# Patient Record
Sex: FEMALE | Race: WHITE | Employment: FULL TIME | URBAN - METROPOLITAN AREA
[De-identification: names, ages, dates, MRNs, and addresses within clinical notes are randomized per-mention and may not be internally consistent; named-entity substitution may affect disease eponyms.]

---

## 2017-03-13 ENCOUNTER — HOSPITAL ENCOUNTER (OUTPATIENT)
Dept: MAMMOGRAPHY | Age: 32
Discharge: HOME | End: 2017-03-13
Payer: COMMERCIAL

## 2017-03-13 ENCOUNTER — HOSPITAL ENCOUNTER (OUTPATIENT)
Dept: MRI IMAGING | Age: 32
Discharge: HOME | End: 2017-03-13
Payer: COMMERCIAL

## 2017-03-13 VITALS — HEART RATE: 75 BPM | SYSTOLIC BLOOD PRESSURE: 114 MMHG | DIASTOLIC BLOOD PRESSURE: 74 MMHG

## 2017-03-13 DIAGNOSIS — Z98.890 STATUS POST BREAST BIOPSY: ICD-10-CM

## 2017-03-13 DIAGNOSIS — N64.52 DISCHARGE FROM NIPPLE: ICD-10-CM

## 2017-03-13 PROCEDURE — 19288 PERQ DEV BREAST ADD MR GUIDE: CPT

## 2017-03-13 PROCEDURE — A9579 GAD-BASE MR CONTRAST NOS,1ML: HCPCS | Performed by: SURGERY

## 2017-03-13 PROCEDURE — 6360000004 HC RX CONTRAST MEDICATION: Performed by: SURGERY

## 2017-03-13 PROCEDURE — 88305 TISSUE EXAM BY PATHOLOGIST: CPT

## 2017-03-13 PROCEDURE — G0206 DX MAMMO INCL CAD UNI: HCPCS

## 2017-03-13 PROCEDURE — 19085 BX BREAST 1ST LESION MR IMAG: CPT

## 2017-03-13 RX ADMIN — GADOPENTETATE DIMEGLUMINE 20 ML: 469.01 INJECTION INTRAVENOUS at 12:32

## 2017-03-14 LAB — SURGICAL PATHOLOGY REPORT: NORMAL

## 2017-07-26 ENCOUNTER — HOSPITAL ENCOUNTER (OUTPATIENT)
Dept: INTERVENTIONAL RADIOLOGY/VASCULAR | Age: 32
Discharge: HOME | End: 2017-07-26
Payer: COMMERCIAL

## 2017-07-26 PROCEDURE — 6360000002 HC RX W HCPCS: Performed by: INTERNAL MEDICINE

## 2017-07-26 PROCEDURE — 6360000002 HC RX W HCPCS

## 2017-07-26 RX ADMIN — THYROTROPIN ALFA 0.9 MG: 0.9 INJECTION, POWDER, FOR SOLUTION INTRAMUSCULAR at 08:56

## 2017-07-27 ENCOUNTER — HOSPITAL ENCOUNTER (OUTPATIENT)
Age: 32
End: 2017-07-27
Payer: COMMERCIAL

## 2017-07-27 ENCOUNTER — HOSPITAL ENCOUNTER (OUTPATIENT)
Dept: INTERVENTIONAL RADIOLOGY/VASCULAR | Age: 32
Discharge: HOME | End: 2017-07-27
Payer: COMMERCIAL

## 2017-07-27 ENCOUNTER — HOSPITAL ENCOUNTER (OUTPATIENT)
Dept: NUCLEAR MEDICINE | Age: 32
Discharge: HOME | End: 2017-07-27
Payer: COMMERCIAL

## 2017-07-27 ENCOUNTER — HOSPITAL ENCOUNTER (OUTPATIENT)
Age: 32
Discharge: HOME | End: 2017-07-27
Payer: COMMERCIAL

## 2017-07-27 DIAGNOSIS — C73 MALIGNANT NEOPLASM OF THYROID GLAND (HCC): ICD-10-CM

## 2017-07-27 LAB — HCG QUALITATIVE: NEGATIVE

## 2017-07-27 PROCEDURE — 78018 THYROID MET IMAGING BODY: CPT

## 2017-07-27 PROCEDURE — 6360000002 HC RX W HCPCS: Performed by: INTERNAL MEDICINE

## 2017-07-27 PROCEDURE — 84703 CHORIONIC GONADOTROPIN ASSAY: CPT

## 2017-07-27 PROCEDURE — A9516 IODINE I-123 SOD IODIDE MIC: HCPCS | Performed by: INTERNAL MEDICINE

## 2017-07-27 PROCEDURE — 3430000000 HC RX DIAGNOSTIC RADIOPHARMACEUTICAL: Performed by: INTERNAL MEDICINE

## 2017-07-27 RX ADMIN — THYROTROPIN ALFA 0.9 MG: 0.9 INJECTION, POWDER, FOR SOLUTION INTRAMUSCULAR at 10:54

## 2017-07-27 RX ADMIN — Medication 1700 MICRO CURIE: at 13:10

## 2017-07-28 ENCOUNTER — HOSPITAL ENCOUNTER (OUTPATIENT)
Dept: NUCLEAR MEDICINE | Age: 32
Discharge: HOME | End: 2017-07-28
Payer: COMMERCIAL

## 2017-07-28 DIAGNOSIS — C73 THYROID CANCER (HCC): ICD-10-CM

## 2017-07-28 PROCEDURE — A9517 I131 IODIDE CAP, RX: HCPCS | Performed by: INTERNAL MEDICINE

## 2017-07-28 PROCEDURE — 79005 NUCLEAR RX ORAL ADMIN: CPT

## 2017-07-28 PROCEDURE — A9509 IODINE I-123 SOD IODIDE MIL: HCPCS

## 2017-07-28 PROCEDURE — 3430000000 HC RX DIAGNOSTIC RADIOPHARMACEUTICAL

## 2017-07-28 PROCEDURE — 3430000000 HC RX DIAGNOSTIC RADIOPHARMACEUTICAL: Performed by: INTERNAL MEDICINE

## 2017-07-28 RX ADMIN — Medication 77 MILLICURIE: at 13:10

## 2017-08-04 ENCOUNTER — HOSPITAL ENCOUNTER (OUTPATIENT)
Age: 32
Discharge: HOME | End: 2017-08-04
Payer: COMMERCIAL

## 2017-08-04 ENCOUNTER — HOSPITAL ENCOUNTER (OUTPATIENT)
Dept: NUCLEAR MEDICINE | Age: 32
Discharge: HOME | End: 2017-08-04
Payer: COMMERCIAL

## 2017-08-04 LAB
THYROGLOBULIN AB: 25.7 IU/ML (ref 0–40)
THYROGLOBULIN: 1.5 NG/ML (ref 0–63.4)

## 2017-08-04 PROCEDURE — 86800 THYROGLOBULIN ANTIBODY: CPT

## 2017-08-04 PROCEDURE — 84432 ASSAY OF THYROGLOBULIN: CPT

## 2017-08-04 PROCEDURE — 36415 COLL VENOUS BLD VENIPUNCTURE: CPT

## 2017-09-21 ENCOUNTER — INITIAL CONSULT (OUTPATIENT)
Dept: ONCOLOGY | Age: 32
End: 2017-09-21
Payer: COMMERCIAL

## 2017-09-21 DIAGNOSIS — Z83.49 FAMILY HISTORY OF THYROID DISEASE: ICD-10-CM

## 2017-09-21 DIAGNOSIS — Z80.3 FAMILY HISTORY OF BREAST CANCER: ICD-10-CM

## 2017-09-21 DIAGNOSIS — C73 THYROID CANCER (HCC): Primary | ICD-10-CM

## 2017-09-21 PROCEDURE — 99215 OFFICE O/P EST HI 40 MIN: CPT | Performed by: GENETIC COUNSELOR, MS

## 2018-02-14 ENCOUNTER — HOSPITAL ENCOUNTER (OUTPATIENT)
Dept: INTERVENTIONAL RADIOLOGY/VASCULAR | Age: 33
Discharge: HOME | End: 2018-02-16
Payer: COMMERCIAL

## 2018-02-14 PROCEDURE — 6360000002 HC RX W HCPCS: Performed by: INTERNAL MEDICINE

## 2018-02-14 PROCEDURE — 2580000003 HC RX 258: Performed by: INTERNAL MEDICINE

## 2018-02-14 RX ADMIN — WATER 1.2 ML: 1 INJECTION INTRAMUSCULAR; INTRAVENOUS; SUBCUTANEOUS at 12:13

## 2018-02-14 RX ADMIN — THYROTROPIN ALFA 0.9 MG: 0.9 INJECTION, POWDER, FOR SOLUTION INTRAMUSCULAR at 12:12

## 2018-02-14 NOTE — PROGRESS NOTES
THYROGEN INJECTION IN IR    PATIENT TO IR HOLDING, IDENTIFIED PER WRITER RN, ALLERGIES CHECKED, TIMEOUT PERFORMED. LEFT UPPER ARM PREPARED PER WRITER RN.  Lewis Schultz RN GAVE IM INJECTION AS ORDERED TO LEFT DELTOID. PATIENT TOLERATED WELL. NO PROBLEMS NOTED. BANDAGE TO LEFT UPPER ARM PUNCTURE SITE. PATIENT STABLE AND AMBULATES SELF OUT OF HOSPITAL.  PATIENT AWARE OF SCHEDULED APPOINTMENT 2/15/18 BACK IN IR.

## 2018-02-15 ENCOUNTER — HOSPITAL ENCOUNTER (OUTPATIENT)
Dept: INTERVENTIONAL RADIOLOGY/VASCULAR | Age: 33
Discharge: HOME | End: 2018-02-17
Payer: COMMERCIAL

## 2018-02-15 ENCOUNTER — HOSPITAL ENCOUNTER (OUTPATIENT)
Dept: NUCLEAR MEDICINE | Age: 33
Discharge: HOME | End: 2018-02-17
Payer: COMMERCIAL

## 2018-02-15 DIAGNOSIS — C73 THYROID CANCER (HCC): ICD-10-CM

## 2018-02-15 PROCEDURE — 78018 THYROID MET IMAGING BODY: CPT

## 2018-02-15 PROCEDURE — 6360000002 HC RX W HCPCS: Performed by: INTERNAL MEDICINE

## 2018-02-15 PROCEDURE — A9509 IODINE I-123 SOD IODIDE MIL: HCPCS | Performed by: INTERNAL MEDICINE

## 2018-02-15 PROCEDURE — 3430000000 HC RX DIAGNOSTIC RADIOPHARMACEUTICAL: Performed by: INTERNAL MEDICINE

## 2018-02-15 RX ADMIN — THYROTROPIN ALFA 0.9 MG: 0.9 INJECTION, POWDER, FOR SOLUTION INTRAMUSCULAR at 11:59

## 2018-02-15 RX ADMIN — Medication 2 MILLICURIE: at 12:55

## 2018-02-16 ENCOUNTER — HOSPITAL ENCOUNTER (OUTPATIENT)
Dept: NUCLEAR MEDICINE | Age: 33
Discharge: HOME | End: 2018-02-18
Payer: COMMERCIAL

## 2019-02-06 ENCOUNTER — PREP FOR PROCEDURE (OUTPATIENT)
Dept: SURGERY | Age: 34
End: 2019-02-06

## 2019-11-05 ENCOUNTER — HOSPITAL ENCOUNTER (EMERGENCY)
Facility: CLINIC | Age: 34
Discharge: HOME | End: 2019-11-05
Attending: EMERGENCY MEDICINE
Payer: COMMERCIAL

## 2019-11-05 ENCOUNTER — APPOINTMENT (OUTPATIENT)
Dept: CT IMAGING | Facility: CLINIC | Age: 34
End: 2019-11-05
Payer: COMMERCIAL

## 2019-11-05 VITALS
SYSTOLIC BLOOD PRESSURE: 145 MMHG | BODY MASS INDEX: 37.49 KG/M2 | TEMPERATURE: 98 F | RESPIRATION RATE: 20 BRPM | HEIGHT: 65 IN | DIASTOLIC BLOOD PRESSURE: 75 MMHG | OXYGEN SATURATION: 99 % | WEIGHT: 225 LBS | HEART RATE: 66 BPM

## 2019-11-05 DIAGNOSIS — R10.9 ABDOMINAL PAIN, UNSPECIFIED ABDOMINAL LOCATION: Primary | ICD-10-CM

## 2019-11-05 LAB
-: ABNORMAL
ABSOLUTE EOS #: 0.3 K/UL (ref 0–0.4)
ABSOLUTE IMMATURE GRANULOCYTE: ABNORMAL K/UL (ref 0–0.3)
ABSOLUTE LYMPH #: 1.3 K/UL (ref 1–4.8)
ABSOLUTE MONO #: 0.6 K/UL (ref 0.1–1.2)
ALBUMIN SERPL-MCNC: 3.7 G/DL (ref 3.5–5.2)
ALBUMIN/GLOBULIN RATIO: 1.3 (ref 1–2.5)
ALP BLD-CCNC: 105 U/L (ref 35–104)
ALT SERPL-CCNC: 16 U/L (ref 5–33)
AMORPHOUS: ABNORMAL
ANION GAP SERPL CALCULATED.3IONS-SCNC: 9 MMOL/L (ref 9–17)
AST SERPL-CCNC: 20 U/L
BACTERIA: ABNORMAL
BASOPHILS # BLD: 1 % (ref 0–2)
BASOPHILS ABSOLUTE: 0.1 K/UL (ref 0–0.2)
BILIRUB SERPL-MCNC: 0.2 MG/DL (ref 0.3–1.2)
BILIRUBIN URINE: NEGATIVE
BUN BLDV-MCNC: 7 MG/DL (ref 6–20)
BUN/CREAT BLD: ABNORMAL (ref 9–20)
CALCIUM SERPL-MCNC: 8.9 MG/DL (ref 8.6–10.4)
CASTS UA: ABNORMAL /LPF (ref 0–2)
CHLORIDE BLD-SCNC: 101 MMOL/L (ref 98–107)
CO2: 31 MMOL/L (ref 20–31)
COLOR: YELLOW
COMMENT UA: ABNORMAL
CREAT SERPL-MCNC: 0.6 MG/DL (ref 0.5–0.9)
CRYSTALS, UA: ABNORMAL /HPF
DIFFERENTIAL TYPE: ABNORMAL
EOSINOPHILS RELATIVE PERCENT: 4 % (ref 1–4)
EPITHELIAL CELLS UA: ABNORMAL /HPF (ref 0–5)
GFR AFRICAN AMERICAN: >60 ML/MIN
GFR NON-AFRICAN AMERICAN: >60 ML/MIN
GFR SERPL CREATININE-BSD FRML MDRD: ABNORMAL ML/MIN/{1.73_M2}
GFR SERPL CREATININE-BSD FRML MDRD: ABNORMAL ML/MIN/{1.73_M2}
GLUCOSE BLD-MCNC: 104 MG/DL (ref 70–99)
GLUCOSE URINE: NEGATIVE
HCT VFR BLD CALC: 37.5 % (ref 36–46)
HEMOGLOBIN: 12.6 G/DL (ref 12–16)
IMMATURE GRANULOCYTES: ABNORMAL %
KETONES, URINE: NEGATIVE
LEUKOCYTE ESTERASE, URINE: NEGATIVE
LYMPHOCYTES # BLD: 16 % (ref 24–44)
MCH RBC QN AUTO: 30 PG (ref 26–34)
MCHC RBC AUTO-ENTMCNC: 33.5 G/DL (ref 31–37)
MCV RBC AUTO: 89.5 FL (ref 80–100)
MONOCYTES # BLD: 7 % (ref 2–11)
MUCUS: ABNORMAL
NITRITE, URINE: NEGATIVE
NRBC AUTOMATED: ABNORMAL PER 100 WBC
OTHER OBSERVATIONS UA: ABNORMAL
PDW BLD-RTO: 13 % (ref 12.5–15.4)
PH UA: 7.5 (ref 5–8)
PLATELET # BLD: 439 K/UL (ref 140–450)
PLATELET ESTIMATE: ABNORMAL
PMV BLD AUTO: 6.2 FL (ref 6–12)
POTASSIUM SERPL-SCNC: 3.9 MMOL/L (ref 3.7–5.3)
PROTEIN UA: NEGATIVE
RBC # BLD: 4.19 M/UL (ref 4–5.2)
RBC # BLD: ABNORMAL 10*6/UL
RBC UA: ABNORMAL /HPF (ref 0–2)
RENAL EPITHELIAL, UA: ABNORMAL /HPF
SEG NEUTROPHILS: 72 % (ref 36–66)
SEGMENTED NEUTROPHILS ABSOLUTE COUNT: 5.9 K/UL (ref 1.8–7.7)
SODIUM BLD-SCNC: 141 MMOL/L (ref 135–144)
SPECIFIC GRAVITY UA: 1.01 (ref 1–1.03)
TOTAL PROTEIN: 6.5 G/DL (ref 6.4–8.3)
TRICHOMONAS: ABNORMAL
TURBIDITY: ABNORMAL
URINE HGB: ABNORMAL
UROBILINOGEN, URINE: NORMAL
WBC # BLD: 8.2 K/UL (ref 3.5–11)
WBC # BLD: ABNORMAL 10*3/UL
WBC UA: ABNORMAL /HPF (ref 0–5)
YEAST: ABNORMAL

## 2019-11-05 PROCEDURE — 36415 COLL VENOUS BLD VENIPUNCTURE: CPT

## 2019-11-05 PROCEDURE — 99284 EMERGENCY DEPT VISIT MOD MDM: CPT

## 2019-11-05 PROCEDURE — 80053 COMPREHEN METABOLIC PANEL: CPT

## 2019-11-05 PROCEDURE — 87086 URINE CULTURE/COLONY COUNT: CPT

## 2019-11-05 PROCEDURE — 85025 COMPLETE CBC W/AUTO DIFF WBC: CPT

## 2019-11-05 PROCEDURE — 81001 URINALYSIS AUTO W/SCOPE: CPT

## 2019-11-05 PROCEDURE — 74176 CT ABD & PELVIS W/O CONTRAST: CPT

## 2019-11-05 RX ORDER — PANTOPRAZOLE SODIUM 40 MG/1
40 TABLET, DELAYED RELEASE ORAL NIGHTLY
COMMUNITY

## 2019-11-05 RX ORDER — ONDANSETRON 4 MG/1
4 TABLET, ORALLY DISINTEGRATING ORAL EVERY 8 HOURS PRN
COMMUNITY
End: 2021-11-03 | Stop reason: SDUPTHER

## 2019-11-05 ASSESSMENT — PAIN DESCRIPTION - LOCATION: LOCATION: ABDOMEN

## 2019-11-05 ASSESSMENT — PAIN DESCRIPTION - DESCRIPTORS: DESCRIPTORS: SHARP;STABBING

## 2019-11-05 ASSESSMENT — ENCOUNTER SYMPTOMS
VOMITING: 0
SHORTNESS OF BREATH: 0
ABDOMINAL PAIN: 1
NAUSEA: 1
COUGH: 0

## 2019-11-05 ASSESSMENT — PAIN DESCRIPTION - PAIN TYPE: TYPE: ACUTE PAIN

## 2019-11-05 ASSESSMENT — PAIN SCALES - GENERAL: PAINLEVEL_OUTOF10: 6

## 2019-11-07 LAB
CULTURE: NORMAL
Lab: NORMAL
SPECIMEN DESCRIPTION: NORMAL

## 2019-11-14 ENCOUNTER — HOSPITAL ENCOUNTER (OUTPATIENT)
Age: 34
Discharge: HOME | End: 2019-11-14
Payer: COMMERCIAL

## 2019-11-14 LAB
CALCIUM IONIZED: 1.21 MMOL/L (ref 1.13–1.33)
CALCIUM SERPL-MCNC: 8.8 MG/DL (ref 8.6–10.4)
CORTISOL COLLECTION INFO: NORMAL
CORTISOL: 3.7 UG/DL (ref 2.7–18.4)
PTH INTACT: 59.84 PG/ML (ref 15–65)
VITAMIN D 25-HYDROXY: 38.8 NG/ML (ref 30–100)

## 2019-11-14 PROCEDURE — 82533 TOTAL CORTISOL: CPT

## 2019-11-14 PROCEDURE — 82088 ASSAY OF ALDOSTERONE: CPT

## 2019-11-14 PROCEDURE — 82306 VITAMIN D 25 HYDROXY: CPT

## 2019-11-14 PROCEDURE — 82330 ASSAY OF CALCIUM: CPT

## 2019-11-14 PROCEDURE — 80299 QUANTITATIVE ASSAY DRUG: CPT

## 2019-11-14 PROCEDURE — 83835 ASSAY OF METANEPHRINES: CPT

## 2019-11-14 PROCEDURE — 82310 ASSAY OF CALCIUM: CPT

## 2019-11-14 PROCEDURE — 84244 ASSAY OF RENIN: CPT

## 2019-11-14 PROCEDURE — 82024 ASSAY OF ACTH: CPT

## 2019-11-14 PROCEDURE — 82627 DEHYDROEPIANDROSTERONE: CPT

## 2019-11-14 PROCEDURE — 36415 COLL VENOUS BLD VENIPUNCTURE: CPT

## 2019-11-14 PROCEDURE — 83970 ASSAY OF PARATHORMONE: CPT

## 2019-11-15 LAB
ADRENOCORTICOTROPIC HORMONE: 7 PG/ML (ref 6–58)
CORTISOL SALIVARY: 0.09 UG/DL
DHEAS (DHEA SULFATE): 31.9 UG/DL (ref 45–270)

## 2019-11-16 LAB
ALDOSTERONE COMMENT: NORMAL
ALDOSTERONE: 11.1 NG/DL

## 2019-11-17 LAB
METANEPH/PLASMA INTERP: NORMAL
METANEPHRINE: 0.14 NMOL/L (ref 0–0.49)
NORMETANEPHRINE PLASMA: 0.35 NMOL/L (ref 0–0.89)
RENIN ACTIVITY: 0.7 NG/ML/HR
RENIN COMMENT: NORMAL

## 2019-11-18 ENCOUNTER — HOSPITAL ENCOUNTER (OUTPATIENT)
Age: 34
Discharge: HOME | End: 2019-11-18
Payer: COMMERCIAL

## 2019-11-18 PROCEDURE — 82530 CORTISOL FREE: CPT

## 2019-11-18 PROCEDURE — 81050 URINALYSIS VOLUME MEASURE: CPT

## 2019-11-19 LAB — DEXAMETHASONE: 72.7 NG/DL

## 2019-11-21 LAB
CORTISOL (UR), FREE: 26.1 UG/D
CORTISOL URINE, FREE (/G CRT): 21.2 UG/L
CORTISOL, URINE RATIO CREATININE: 20.58 UG/G CRT
CORTISOL, URINE: NORMAL
CREATININE URINE /24 HR: 1270 MG/D (ref 700–1600)
CREATININE URINE /VOLUME: 103 MG/DL
HOURS COLLECTED: 24
URINE VOLUME: 1233

## 2020-05-25 ENCOUNTER — HOSPITAL ENCOUNTER (EMERGENCY)
Facility: CLINIC | Age: 35
Discharge: HOME | End: 2020-05-25
Attending: EMERGENCY MEDICINE
Payer: COMMERCIAL

## 2020-05-25 VITALS
BODY MASS INDEX: 48.32 KG/M2 | DIASTOLIC BLOOD PRESSURE: 79 MMHG | SYSTOLIC BLOOD PRESSURE: 144 MMHG | OXYGEN SATURATION: 99 % | HEART RATE: 67 BPM | WEIGHT: 290 LBS | TEMPERATURE: 98 F | RESPIRATION RATE: 20 BRPM | HEIGHT: 65 IN

## 2020-05-25 LAB
ABSOLUTE EOS #: 0.2 K/UL (ref 0–0.4)
ABSOLUTE IMMATURE GRANULOCYTE: NORMAL K/UL (ref 0–0.3)
ABSOLUTE LYMPH #: 1.8 K/UL (ref 1–4.8)
ABSOLUTE MONO #: 0.4 K/UL (ref 0.1–1.2)
ANION GAP SERPL CALCULATED.3IONS-SCNC: 11 MMOL/L (ref 9–17)
BASOPHILS # BLD: 1 % (ref 0–2)
BASOPHILS ABSOLUTE: 0.1 K/UL (ref 0–0.2)
BNP INTERPRETATION: NORMAL
BUN BLDV-MCNC: 6 MG/DL (ref 6–20)
BUN/CREAT BLD: ABNORMAL (ref 9–20)
CALCIUM SERPL-MCNC: 8.5 MG/DL (ref 8.6–10.4)
CHLORIDE BLD-SCNC: 102 MMOL/L (ref 98–107)
CO2: 27 MMOL/L (ref 20–31)
CREAT SERPL-MCNC: 1.1 MG/DL (ref 0.5–0.9)
DIFFERENTIAL TYPE: NORMAL
EOSINOPHILS RELATIVE PERCENT: 4 % (ref 1–4)
GFR AFRICAN AMERICAN: >60 ML/MIN
GFR NON-AFRICAN AMERICAN: 56 ML/MIN
GFR SERPL CREATININE-BSD FRML MDRD: ABNORMAL ML/MIN/{1.73_M2}
GFR SERPL CREATININE-BSD FRML MDRD: ABNORMAL ML/MIN/{1.73_M2}
GLUCOSE BLD-MCNC: 95 MG/DL (ref 70–99)
HCT VFR BLD CALC: 43.4 % (ref 36–46)
HEMOGLOBIN: 14.6 G/DL (ref 12–16)
IMMATURE GRANULOCYTES: NORMAL %
LYMPHOCYTES # BLD: 26 % (ref 24–44)
MCH RBC QN AUTO: 30.5 PG (ref 26–34)
MCHC RBC AUTO-ENTMCNC: 33.6 G/DL (ref 31–37)
MCV RBC AUTO: 90.9 FL (ref 80–100)
MONOCYTES # BLD: 6 % (ref 2–11)
NRBC AUTOMATED: NORMAL PER 100 WBC
PDW BLD-RTO: 13.1 % (ref 12.5–15.4)
PLATELET # BLD: 418 K/UL (ref 140–450)
PLATELET ESTIMATE: NORMAL
PMV BLD AUTO: 6.3 FL (ref 6–12)
POTASSIUM SERPL-SCNC: 3.2 MMOL/L (ref 3.7–5.3)
PRO-BNP: 41 PG/ML
RBC # BLD: 4.77 M/UL (ref 4–5.2)
RBC # BLD: NORMAL 10*6/UL
SEG NEUTROPHILS: 63 % (ref 36–66)
SEGMENTED NEUTROPHILS ABSOLUTE COUNT: 4.3 K/UL (ref 1.8–7.7)
SODIUM BLD-SCNC: 140 MMOL/L (ref 135–144)
THYROXINE, FREE: 0.23 NG/DL (ref 0.93–1.7)
TROPONIN INTERP: NORMAL
TROPONIN T: NORMAL NG/ML
TROPONIN, HIGH SENSITIVITY: <6 NG/L (ref 0–14)
TSH SERPL DL<=0.05 MIU/L-ACNC: 53.9 MIU/L (ref 0.3–5)
WBC # BLD: 6.8 K/UL (ref 3.5–11)
WBC # BLD: NORMAL 10*3/UL

## 2020-05-25 PROCEDURE — 83880 ASSAY OF NATRIURETIC PEPTIDE: CPT

## 2020-05-25 PROCEDURE — 2580000003 HC RX 258: Performed by: EMERGENCY MEDICINE

## 2020-05-25 PROCEDURE — 99284 EMERGENCY DEPT VISIT MOD MDM: CPT

## 2020-05-25 PROCEDURE — 84443 ASSAY THYROID STIM HORMONE: CPT

## 2020-05-25 PROCEDURE — 36415 COLL VENOUS BLD VENIPUNCTURE: CPT

## 2020-05-25 PROCEDURE — 80048 BASIC METABOLIC PNL TOTAL CA: CPT

## 2020-05-25 PROCEDURE — 85025 COMPLETE CBC W/AUTO DIFF WBC: CPT

## 2020-05-25 PROCEDURE — 93005 ELECTROCARDIOGRAM TRACING: CPT | Performed by: EMERGENCY MEDICINE

## 2020-05-25 PROCEDURE — 84439 ASSAY OF FREE THYROXINE: CPT

## 2020-05-25 PROCEDURE — 6370000000 HC RX 637 (ALT 250 FOR IP): Performed by: EMERGENCY MEDICINE

## 2020-05-25 PROCEDURE — 84484 ASSAY OF TROPONIN QUANT: CPT

## 2020-05-25 RX ORDER — LEVOTHYROXINE SODIUM 75 UG/ML
150 SOLUTION ORAL DAILY
COMMUNITY
End: 2020-07-30 | Stop reason: ALTCHOICE

## 2020-05-25 RX ORDER — 0.9 % SODIUM CHLORIDE 0.9 %
1000 INTRAVENOUS SOLUTION INTRAVENOUS ONCE
Status: COMPLETED | OUTPATIENT
Start: 2020-05-25 | End: 2020-05-25

## 2020-05-25 RX ADMIN — POTASSIUM BICARBONATE 40 MEQ: 782 TABLET, EFFERVESCENT ORAL at 12:33

## 2020-05-25 RX ADMIN — SODIUM CHLORIDE 1000 ML: 9 INJECTION, SOLUTION INTRAVENOUS at 12:40

## 2020-05-25 ASSESSMENT — PAIN SCALES - GENERAL: PAINLEVEL_OUTOF10: 3

## 2020-05-25 ASSESSMENT — PAIN DESCRIPTION - FREQUENCY: FREQUENCY: CONTINUOUS

## 2020-05-25 ASSESSMENT — PAIN DESCRIPTION - DESCRIPTORS: DESCRIPTORS: ACHING

## 2020-05-25 ASSESSMENT — PAIN DESCRIPTION - LOCATION: LOCATION: HEAD

## 2020-05-25 ASSESSMENT — ENCOUNTER SYMPTOMS
COUGH: 0
SHORTNESS OF BREATH: 0

## 2020-05-25 NOTE — ED PROVIDER NOTES
[address] ED  EMERGENCY DEPARTMENT ENCOUNTER      Pt Name: Mg Kline  MRN: [id number]  [place] 1985  Date of evaluation: 5/25/2020  Provider: Marcela Cash MD    CHIEF COMPLAINT     Chief Complaint   Patient presents with    Dizziness     c/o \"think my thyroid is acting up\" c/o feeling dizzy since Saturday. pt c/o feeling scared due to Covid and doesn't want to go to work tomorrow and requesting to be off. HISTORY OF PRESENT ILLNESS   (Location/Symptom, Timing/Onset, Context/Setting,Quality, Duration, Modifying Factors, Severity)  Note limiting factors. Mg Kline is a 37 y.o. female who presents to the emergency department 2-day history of lightheadedness and dizziness. Patient states she feels bloated. She has a history of thyroid cancer and underwent total thyroidectomy a few years ago. She has been on thyroid supplementation since and is scheduled to have a TSH done but was uncertain if she could get this done as an outpatient. She has an appointment with her endocrinologist on June 8. The history is provided by the patient and medical records. Nursing Notes werereviewed. REVIEW OF SYSTEMS    (2-9 systems for level 4, 10 or more for level 5)     Review of Systems   Constitutional: Positive for unexpected weight change. Negative for fever. Respiratory: Negative for cough and shortness of breath. All other systems reviewed and are negative. Except as noted above the remainder of the review of systems was reviewed and negative.        PAST MEDICAL HISTORY     Past Medical History:   Diagnosis Date    Arrhythmia 4/7/2011    Depression 4/7/2011    Migraine 4/7/2011    Neurologic cardiac syncope 4/7/2011         SURGICALHISTORY       Past Surgical History:   Procedure Laterality Date    APPENDECTOMY      GALLBLADDER SURGERY           CURRENT MEDICATIONS       Discharge Medication List as of 5/25/2020  1:19 PM      CONTINUE these medications which have NOT CHANGED    Details   Levothyroxine Sodium (TIROSINT-SOL) 75 MCG/ML SOLN Take by mouth 75mcg qdHistorical Med      pantoprazole (PROTONIX) 40 MG tablet Take 40 mg by mouth dailyHistorical Med      ondansetron (ZOFRAN-ODT) 4 MG disintegrating tablet Take 4 mg by mouth every 8 hours as needed for Nausea or VomitingHistorical Med      Vitamin D (CHOLECALCIFEROL) 1000 UNITS CAPS capsule Take 4,000 Units by mouth 2 times daily      Magnesium Oxide 500 MG TABS Take 500 mg by mouth 2 times daily      promethazine (PHENERGAN) 25 MG tablet Take 1 po q 6 hours PRN, Disp-30 tablet, R-0      ALPRAZolam (XANAX) 1 MG tablet Take 1 tablet by mouth nightly as needed for Sleep. Do Not drive, this medication cause drowsiness   , Disp-10 tablet, R-0      FLUoxetine (PROZAC) 20 MG capsule Take 1 capsule by mouth daily. , Disp-90 capsule, R-0      fludrocortisone (FLORINEF) 0.1 MG tablet Take 0.1 mg by mouth daily. ALLERGIES     Amoxil [amoxicillin];  Nsaids; and Shrimp (diagnostic)    FAMILY HISTORY       Family History   Problem Relation Age of Onset    Thyroid Disease Paternal Grandfather    [facility] Migraines Mother     Other Mother 43        uterine fibroids, DEXTER/BSO     High Blood Pressure Father     Heart Disease Father     Migraines Sister     Breast Cancer Maternal Cousin         1st cousin once removed     Thyroid Cancer Maternal Cousin           SOCIAL HISTORY       Social History     Socioeconomic History    Marital status:      Spouse name: Not on file    Number of children: Not on file    Years of education: Not on file    Highest education level: Not on file   Occupational History    Not on file   Social Needs    Financial resource strain: Not on file    Food insecurity     Worry: Not on file     Inability: Not on file    Transportation needs     Medical: Not on file     Non-medical: Not on file   Tobacco Use    Smoking status: Never Smoker    Smokeless tobacco: Never Used   Substance normal saline and given 40 mEq of oral potassium. Upon discharge she is advised to eat a banana or orange daily to supplement her potassium stores. TSH level has been ordered but has to be sent out to be reported and should be resulted by tomorrow. Patient is advised to check her lab work and contact her [de-identified] office for further management. MDM    CONSULTS:  None    PROCEDURES:  Unlessotherwise noted below, none     Procedures    FINAL IMPRESSION      1. Lightheadedness    2. Hypokalemia          DISPOSITION/PLAN   DISPOSITION Decision To Discharge 05/25/2020 01:18:00 PM      PATIENT REFERRED TO:  VIVIEN Salmeron CNP  1F [address] [facility] [address]          MD Stef [address] [place] [address]  [phone]      on june 8 as scheduled      DISCHARGE MEDICATIONS:         Problem List:  Patient Active Problem List   Diagnosis Code    Depression F32.9    Arrhythmia I49.9    Neurologic cardiac syncope R55    Migraine G43.909           Summation      Patient Course: Discharged. ED Medicationsadministered this visit:    Medications   0.9 % sodium chloride bolus (0 mLs Intravenous Stopped 5/25/20 1313)   potassium bicarb-citric acid (EFFER-K) effervescent tablet 40 mEq (40 mEq Oral Given 5/25/20 1233)       New Prescriptions from this visit:    Discharge Medication List as of 5/25/2020  1:19 PM          Follow-up:  VIVIEN Salmeron CNP  [address] [facility] [address] [place] [address]          MD Stef Lynch [address]  [phone]      on june 8 as scheduled        Final Impression:   1. Lightheadedness    2.  Hypokalemia               (Please note that portions of this note were completed with a voice recognitionprogram.  Efforts were made to edit the dictations but occasionally words are mis-transcribed.)    Nathan Vivas MD (electronically signed)  Attending Emergency

## 2020-05-25 NOTE — ED NOTES
Pt had her thyroid removed in 2017, pt states she sees Dr Freya Parkinson and hasn't had her levels checked since 4/27. Pt c/o feeling dizzy since Saturday and slight headache. Pt next appt is 6/8. Pt has a script for her thyroid levels to be drawn \"a week before I go see him\". Pt states \"I have to go back to work tomorrow and I don't want to because I feel dizzy and I probably need my level check now or not go to work and labs aren't open due to Covid\".       Harish So RN  05/25/20 1355

## 2020-05-25 NOTE — ED NOTES
Bed: ER11  Expected date:   Expected time:   Means of arrival:   Comments:     Maria Fernanda Parada RN  05/25/20 1053

## 2020-05-26 LAB
EKG ATRIAL RATE: 65 BPM
EKG P AXIS: 32 DEGREES
EKG P-R INTERVAL: 166 MS
EKG Q-T INTERVAL: 434 MS
EKG QRS DURATION: 106 MS
EKG QTC CALCULATION (BAZETT): 451 MS
EKG R AXIS: 6 DEGREES
EKG T AXIS: -92 DEGREES
EKG VENTRICULAR RATE: 65 BPM

## 2020-06-16 ENCOUNTER — OFFICE VISIT (OUTPATIENT)
Dept: FAMILY MEDICINE CLINIC | Age: 35
End: 2020-06-16
Payer: COMMERCIAL

## 2020-06-16 VITALS
BODY MASS INDEX: 43.58 KG/M2 | HEART RATE: 72 BPM | DIASTOLIC BLOOD PRESSURE: 68 MMHG | TEMPERATURE: 97.2 F | SYSTOLIC BLOOD PRESSURE: 114 MMHG | OXYGEN SATURATION: 98 % | RESPIRATION RATE: 17 BRPM | HEIGHT: 66 IN | WEIGHT: 271.2 LBS

## 2020-06-16 PROCEDURE — 99203 OFFICE O/P NEW LOW 30 MIN: CPT | Performed by: NURSE PRACTITIONER

## 2020-06-16 RX ORDER — TOPIRAMATE 100 MG/1
200 TABLET, FILM COATED ORAL NIGHTLY
Status: ON HOLD | COMMUNITY
End: 2021-11-04

## 2020-06-16 RX ORDER — ARMODAFINIL 250 MG/1
TABLET ORAL DAILY
COMMUNITY

## 2020-06-16 RX ORDER — TOPIRAMATE 50 MG/1
50 TABLET, FILM COATED ORAL DAILY
COMMUNITY
End: 2020-07-30 | Stop reason: ALTCHOICE

## 2020-06-16 RX ORDER — ALPRAZOLAM 1 MG/1
1 TABLET ORAL NIGHTLY PRN
Qty: 10 TABLET | Refills: 1 | Status: SHIPPED | OUTPATIENT
Start: 2020-06-16 | End: 2020-06-26

## 2020-06-16 RX ORDER — FLUOXETINE HYDROCHLORIDE 20 MG/1
40 CAPSULE ORAL DAILY
Qty: 90 CAPSULE | Refills: 1 | Status: SHIPPED | OUTPATIENT
Start: 2020-06-16 | End: 2021-07-01

## 2020-06-16 SDOH — ECONOMIC STABILITY: FOOD INSECURITY: WITHIN THE PAST 12 MONTHS, THE FOOD YOU BOUGHT JUST DIDN'T LAST AND YOU DIDN'T HAVE MONEY TO GET MORE.: NEVER TRUE

## 2020-06-16 SDOH — SOCIAL STABILITY: SOCIAL NETWORK: HOW OFTEN DO YOU ATTENT MEETINGS OF THE CLUB OR ORGANIZATION YOU BELONG TO?: NEVER

## 2020-06-16 SDOH — SOCIAL STABILITY: SOCIAL NETWORK: ARE YOU MARRIED, WIDOWED, DIVORCED, SEPARATED, NEVER MARRIED, OR LIVING WITH A PARTNER?: MARRIED

## 2020-06-16 SDOH — HEALTH STABILITY: MENTAL HEALTH
STRESS IS WHEN SOMEONE FEELS TENSE, NERVOUS, ANXIOUS, OR CAN'T SLEEP AT NIGHT BECAUSE THEIR MIND IS TROUBLED. HOW STRESSED ARE YOU?: TO SOME EXTENT

## 2020-06-16 SDOH — SOCIAL STABILITY: SOCIAL INSECURITY
WITHIN THE LAST YEAR, HAVE TO BEEN RAPED OR FORCED TO HAVE ANY KIND OF SEXUAL ACTIVITY BY YOUR PARTNER OR EX-PARTNER?: NO

## 2020-06-16 SDOH — ECONOMIC STABILITY: FOOD INSECURITY: WITHIN THE PAST 12 MONTHS, YOU WORRIED THAT YOUR FOOD WOULD RUN OUT BEFORE YOU GOT MONEY TO BUY MORE.: NEVER TRUE

## 2020-06-16 SDOH — SOCIAL STABILITY: SOCIAL INSECURITY: WITHIN THE LAST YEAR, HAVE YOU BEEN HUMILIATED OR EMOTIONALLY ABUSED IN OTHER WAYS BY YOUR PARTNER OR EX-PARTNER?: NO

## 2020-06-16 SDOH — ECONOMIC STABILITY: TRANSPORTATION INSECURITY
IN THE PAST 12 MONTHS, HAS THE LACK OF TRANSPORTATION KEPT YOU FROM MEDICAL APPOINTMENTS OR FROM GETTING MEDICATIONS?: NO

## 2020-06-16 SDOH — HEALTH STABILITY: PHYSICAL HEALTH: ON AVERAGE, HOW MANY MINUTES DO YOU ENGAGE IN EXERCISE AT THIS LEVEL?: 30 MIN

## 2020-06-16 SDOH — SOCIAL STABILITY: SOCIAL NETWORK: HOW OFTEN DO YOU ATTEND CHURCH OR RELIGIOUS SERVICES?: NEVER

## 2020-06-16 SDOH — SOCIAL STABILITY: SOCIAL NETWORK: HOW OFTEN DO YOU GET TOGETHER WITH FRIENDS OR RELATIVES?: ONCE A WEEK

## 2020-06-16 SDOH — SOCIAL STABILITY: SOCIAL INSECURITY: WITHIN THE LAST YEAR, HAVE YOU BEEN AFRAID OF YOUR PARTNER OR EX-PARTNER?: NO

## 2020-06-16 SDOH — HEALTH STABILITY: PHYSICAL HEALTH: ON AVERAGE, HOW MANY DAYS PER WEEK DO YOU ENGAGE IN MODERATE TO STRENUOUS EXERCISE (LIKE A BRISK WALK)?: 3 DAYS

## 2020-06-16 SDOH — SOCIAL STABILITY: SOCIAL NETWORK: IN A TYPICAL WEEK, HOW MANY TIMES DO YOU TALK ON THE PHONE WITH FAMILY, FRIENDS, OR NEIGHBORS?: ONCE A WEEK

## 2020-06-16 SDOH — ECONOMIC STABILITY: TRANSPORTATION INSECURITY
IN THE PAST 12 MONTHS, HAS LACK OF TRANSPORTATION KEPT YOU FROM MEETINGS, WORK, OR FROM GETTING THINGS NEEDED FOR DAILY LIVING?: NO

## 2020-06-16 SDOH — ECONOMIC STABILITY: INCOME INSECURITY: HOW HARD IS IT FOR YOU TO PAY FOR THE VERY BASICS LIKE FOOD, HOUSING, MEDICAL CARE, AND HEATING?: NOT HARD AT ALL

## 2020-06-16 SDOH — SOCIAL STABILITY: SOCIAL NETWORK
DO YOU BELONG TO ANY CLUBS OR ORGANIZATIONS SUCH AS CHURCH GROUPS UNIONS, FRATERNAL OR ATHLETIC GROUPS, OR SCHOOL GROUPS?: NO

## 2020-06-16 SDOH — SOCIAL STABILITY: SOCIAL INSECURITY
WITHIN THE LAST YEAR, HAVE YOU BEEN KICKED, HIT, SLAPPED, OR OTHERWISE PHYSICALLY HURT BY YOUR PARTNER OR EX-PARTNER?: NO

## 2020-06-16 ASSESSMENT — PATIENT HEALTH QUESTIONNAIRE - PHQ9
SUM OF ALL RESPONSES TO PHQ QUESTIONS 1-9: 0
SUM OF ALL RESPONSES TO PHQ9 QUESTIONS 1 & 2: 0
1. LITTLE INTEREST OR PLEASURE IN DOING THINGS: 0
SUM OF ALL RESPONSES TO PHQ QUESTIONS 1-9: 0
2. FEELING DOWN, DEPRESSED OR HOPELESS: 0

## 2020-06-16 NOTE — PROGRESS NOTES
0    fludrocortisone (FLORINEF) 0.1 MG tablet Take 0.1 mg by mouth daily. No current facility-administered medications for this visit.       Past Medical History:   Diagnosis Date    Arrhythmia 2011    Depression 2011    GERD (gastroesophageal reflux disease)     Gastoric presis     Hyperthyroidism     Migraine 2011    Neurologic cardiac syncope 2011      Past Surgical History:   Procedure Laterality Date    APPENDECTOMY      BREAST LUMPECTOMY Left     5 lumps removed out of left breast      SECTION      2 C- Sections     GALLBLADDER SURGERY      GASTRIC BYPASS SURGERY      MICHELLE-EN-Y GASTRIC BYPASS N/A     Patient had to get gastric bypass reversed due to Ulcers removed     THYROIDECTOMY Bilateral     whole thyroid removed      Family History   Problem Relation Age of Onset    Thyroid Disease Paternal Grandfather    Connie Carbone Migraines Mother     Other Mother 43        uterine fibroids, DEXTER/BSO     High Blood Pressure Father     Heart Disease Father     Migraines Sister     Breast Cancer Maternal Cousin         1st cousin once removed     Thyroid Cancer Maternal Cousin     No Known Problems Brother     No Known Problems Sister      Social History     Tobacco Use    Smoking status: Never Smoker    Smokeless tobacco: Never Used   Substance Use Topics    Alcohol use: No      Allergies   Allergen Reactions    Amoxil [Amoxicillin] Hives    Cefdinir Hives     Other reaction(s): hives rash  Other reaction(s): hives rash      Frovatriptan Hives     Other reaction(s): hives  Other reaction(s): hives      Metoclopramide      Other reaction(s): JITTERY/ELEVATED BP  Other reaction(s): JITTERY/ELEVATED BP  Other reaction(s): JITTERY/ELEVATED BP      Morphine Itching    Nsaids     Shrimp (Diagnostic) Hives     Health Maintenance   Topic Date Due    Cervical cancer screen  2021 (Originally 2014)    HIV screen  2021 (Originally 1998)    DTaP/Tdap/Td

## 2020-06-22 PROBLEM — E87.6 HYPOKALEMIA: Status: ACTIVE | Noted: 2020-06-22

## 2020-06-22 PROBLEM — E55.9 VITAMIN D DEFICIENCY: Status: ACTIVE | Noted: 2020-06-22

## 2020-06-22 PROBLEM — F41.9 ANXIETY: Status: ACTIVE | Noted: 2020-06-22

## 2020-06-22 PROBLEM — Z76.89 ENCOUNTER TO ESTABLISH CARE: Status: ACTIVE | Noted: 2020-06-22

## 2020-06-22 PROBLEM — R73.01 ELEVATED FASTING GLUCOSE: Status: ACTIVE | Noted: 2020-06-22

## 2020-06-22 ASSESSMENT — ENCOUNTER SYMPTOMS
RHINORRHEA: 0
CONSTIPATION: 0
SHORTNESS OF BREATH: 0
COUGH: 0
SORE THROAT: 0
DIARRHEA: 0

## 2020-06-25 LAB
AVERAGE GLUCOSE: 94
HBA1C MFR BLD: 4.9 %

## 2020-07-30 ENCOUNTER — OFFICE VISIT (OUTPATIENT)
Dept: FAMILY MEDICINE CLINIC | Age: 35
End: 2020-07-30
Payer: COMMERCIAL

## 2020-07-30 VITALS
BODY MASS INDEX: 45.62 KG/M2 | TEMPERATURE: 97.1 F | DIASTOLIC BLOOD PRESSURE: 72 MMHG | OXYGEN SATURATION: 98 % | HEIGHT: 65 IN | RESPIRATION RATE: 16 BRPM | WEIGHT: 273.8 LBS | HEART RATE: 66 BPM | SYSTOLIC BLOOD PRESSURE: 108 MMHG

## 2020-07-30 PROCEDURE — 99214 OFFICE O/P EST MOD 30 MIN: CPT | Performed by: NURSE PRACTITIONER

## 2020-07-30 RX ORDER — POTASSIUM CHLORIDE 750 MG/1
CAPSULE, EXTENDED RELEASE ORAL 3 TIMES DAILY
COMMUNITY
Start: 2020-07-28 | End: 2020-10-13 | Stop reason: SDUPTHER

## 2020-07-30 RX ORDER — LEVOTHYROXINE SODIUM 175 UG/1
175 TABLET ORAL DAILY
COMMUNITY
End: 2020-11-24 | Stop reason: DRUGHIGH

## 2020-07-30 RX ORDER — CETIRIZINE HYDROCHLORIDE 10 MG/1
TABLET ORAL NIGHTLY
COMMUNITY
Start: 2020-06-25

## 2020-07-30 RX ORDER — CEFUROXIME AXETIL 250 MG/1
TABLET ORAL PRN
COMMUNITY
Start: 2020-06-18

## 2020-07-30 RX ORDER — THYROID, PORCINE 60 MG/1
65 TABLET ORAL DAILY
COMMUNITY
End: 2021-01-26

## 2020-07-30 RX ORDER — AMILORIDE HYDROCHLORIDE 5 MG/1
TABLET ORAL DAILY
COMMUNITY
Start: 2020-07-28 | End: 2020-10-13

## 2020-07-30 ASSESSMENT — PATIENT HEALTH QUESTIONNAIRE - PHQ9
1. LITTLE INTEREST OR PLEASURE IN DOING THINGS: 0
SUM OF ALL RESPONSES TO PHQ9 QUESTIONS 1 & 2: 0
2. FEELING DOWN, DEPRESSED OR HOPELESS: 0
SUM OF ALL RESPONSES TO PHQ QUESTIONS 1-9: 0
SUM OF ALL RESPONSES TO PHQ QUESTIONS 1-9: 0

## 2020-07-30 NOTE — PROGRESS NOTES
[address] [facility]   [address]  [phone]    7/30/2020     Patient ID: Cuate Nayak is a 38 y.o. female. CHIEF COMPLAINT:     Cuate Nayak presents today for her medical conditions/complaints as noted below. Senthil Mitchell is c/o of Atrial Fibrillation (6 week follow up Symptom Check) and Discuss Labs (Lab review )  . REVIEWED:      [x] Past Medical, Family, and Social History was reviewed per writer and does contribute to the patient presenting condition.     [x] Laboratory Results, Vital signs, Imaging, Active Problems, Immunizations, Current/Recently Discontinued Medications, Health Maintenance Activities Due, Referral Notes (if available) were reviewed per writer     [x] Reviewed Depression screening if taken or valid today or any other valid screening tool (others seen below) Interpretation of Total Score DepressionSeverity: 1-4 = Minimal depression, 5-9 = Mild depression, 10-14 = Moderate depression, 15-19 = Moderately severe depression, 20-27 =Severe depression    PHQ Scores 7/30/2020 6/16/2020   PHQ2 Score 0 0   PHQ9 Score 0 0       Interpretation of Total Score DepressionSeverity: 1-4 = Minimal depression, 5-9 = Mild depression, 10-14 = Moderate depression, 15-19 = Moderately severe depression, 20-27 = Severe depression    Allergies   Allergen Reactions    Amoxicillin-Pot Clavulanate Hives     Other reaction(s): rash    Amoxil [Amoxicillin] Hives    Cefdinir Hives and Rash           Frovatriptan Hives           Metoclopramide Other (See Comments)     Other reaction(s): JITTERY/ELEVATED BP        Morphine Itching    Nsaids Other (See Comments)     Patient Gets Stomach Ulcers    Shrimp (Diagnostic) Hives    Shrimp Extract Allergy Skin Test Hives     Current Outpatient Medications   Medication Sig Dispense Refill    aMILoride (MIDAMOR) 5 MG tablet Take by mouth daily      cetirizine (ZYRTEC) 10 MG tablet Take by mouth daily      potassium Migraines Sister     Breast Cancer Maternal Cousin         1st cousin once removed     Thyroid Cancer Maternal Cousin     No Known Problems Brother     No Known Problems Sister      Social History     Tobacco Use    Smoking status: Never Smoker    Smokeless tobacco: Never Used   Substance Use Topics    Alcohol use: No        Patient Care Team:    REVIEW OF SYSTEMS:     Review of Systems   Constitutional: Positive for fatigue. Negative for activity change and unexpected weight change. HENT: Negative for congestion, ear pain, hearing loss, rhinorrhea and sore throat. Respiratory: Negative for cough and shortness of breath. Cardiovascular: Negative for chest pain, palpitations and leg swelling. Gastrointestinal: Positive for abdominal distention (improving). Negative for constipation and diarrhea. Musculoskeletal: Negative for arthralgias and gait problem. Neurological: Positive for headaches (current migraine - does not request any treatment at this time). Negative for dizziness and weakness. Psychiatric/Behavioral: Negative for confusion. The patient is nervous/anxious (regarding new history and recommendation for thyroid nodule). HISTORY OF PRESENT ILLNESS       She does have a migraine today -     Patient was seen in er due to low potassium and was increased on potassium - states that it was down to 2.7 when contacted by endocrine - and reported that she was given oral Potassium and increased dosage to 10 meq 3x daily       Reports that she hsa felt as if her stomach is improving with emptying since the GPOM procedure    She was started on amiloride 5 mg daily and increased potasium 3 x daily by [facility] to help manage fluid levels - weekly blood pwerk      Noted significant increase her LDL from previous year - this primarily may be due to malabsorption issues and continues to remain an.     Returns today for evaluation of labs.   However notes that labs have not completed at this Normal heart sounds. Pulmonary:      Effort: Pulmonary effort is normal.      Breath sounds: Normal breath sounds. Abdominal:      General: Abdomen is flat. There is no distension. Palpations: Abdomen is soft. There is no mass. Tenderness: There is no abdominal tenderness. There is no right CVA tenderness or left CVA tenderness. Genitourinary:     Rectum: Normal.   Musculoskeletal: Normal range of motion. General: No swelling or tenderness. Skin:     General: Skin is warm. Capillary Refill: Capillary refill takes less than 2 seconds. Findings: No erythema or rash. Neurological:      General: No focal deficit present. Mental Status: She is alert and oriented to person, place, and time. Psychiatric:         Mood and Affect: Mood normal.         Behavior: Behavior normal.          ASSESSMENT /PLAN     1. Hypokalemia  Continue   - Basic Metabolic Panel; Standing    2. Elevated LDL cholesterol level  Repeat lab as ordered  Continue to monitor  Continue with medication   -healthy diet as discussed  -daily exercise with in physical limitations    - Lipid Panel; Future  - Comprehensive Metabolic Panel, Fasting; Future      Return in about 3 months (around 10/30/2020) for symptom check and lab review. COMMUNICATION:           The best way to find yourself is to lose yourself in the service of others - [address] [facility] [facility]   [email or web address]  Office: [phone]   Cell: [phone]    Electronically signed by MADDIE Sorensen on 8/11/2020 at 12:11 AM

## 2020-08-03 LAB
BUN BLDV-MCNC: 13 MG/DL
CALCIUM SERPL-MCNC: 8.6 MG/DL
CHLORIDE BLD-SCNC: 107 MMOL/L
CO2: 27 MMOL/L
CREAT SERPL-MCNC: 1.13 MG/DL
GFR CALCULATED: 54
GLUCOSE BLD-MCNC: 110 MG/DL
POTASSIUM SERPL-SCNC: 3.8 MMOL/L
SODIUM BLD-SCNC: 142 MMOL/L

## 2020-08-11 ASSESSMENT — ENCOUNTER SYMPTOMS
COUGH: 0
CONSTIPATION: 0
SORE THROAT: 0
SHORTNESS OF BREATH: 0
DIARRHEA: 0
ABDOMINAL DISTENTION: 1
RHINORRHEA: 0

## 2020-10-05 ENCOUNTER — OFFICE VISIT (OUTPATIENT)
Dept: FAMILY MEDICINE CLINIC | Age: 35
End: 2020-10-05
Payer: COMMERCIAL

## 2020-10-05 ENCOUNTER — TELEPHONE (OUTPATIENT)
Dept: FAMILY MEDICINE CLINIC | Age: 35
End: 2020-10-05

## 2020-10-05 VITALS
TEMPERATURE: 97.9 F | DIASTOLIC BLOOD PRESSURE: 76 MMHG | HEIGHT: 65 IN | RESPIRATION RATE: 15 BRPM | WEIGHT: 269.6 LBS | OXYGEN SATURATION: 97 % | BODY MASS INDEX: 44.92 KG/M2 | SYSTOLIC BLOOD PRESSURE: 118 MMHG | HEART RATE: 84 BPM

## 2020-10-05 PROCEDURE — 99214 OFFICE O/P EST MOD 30 MIN: CPT | Performed by: NURSE PRACTITIONER

## 2020-10-05 RX ORDER — HYDROCODONE BITARTRATE AND ACETAMINOPHEN 5; 325 MG/1; MG/1
1 TABLET ORAL EVERY 6 HOURS PRN
Qty: 20 TABLET | Refills: 0 | Status: SHIPPED | OUTPATIENT
Start: 2020-10-05 | End: 2020-10-10

## 2020-10-05 RX ORDER — PREDNISONE 20 MG/1
TABLET ORAL
Qty: 20 TABLET | Refills: 0 | Status: SHIPPED | OUTPATIENT
Start: 2020-10-05 | End: 2020-10-17

## 2020-10-05 RX ORDER — TRAMADOL HYDROCHLORIDE 50 MG/1
50 TABLET ORAL EVERY 6 HOURS PRN
COMMUNITY
Start: 2020-10-05 | End: 2020-10-05 | Stop reason: ALTCHOICE

## 2020-10-05 ASSESSMENT — PATIENT HEALTH QUESTIONNAIRE - PHQ9
1. LITTLE INTEREST OR PLEASURE IN DOING THINGS: 0
2. FEELING DOWN, DEPRESSED OR HOPELESS: 0
SUM OF ALL RESPONSES TO PHQ QUESTIONS 1-9: 0
SUM OF ALL RESPONSES TO PHQ QUESTIONS 1-9: 0
SUM OF ALL RESPONSES TO PHQ9 QUESTIONS 1 & 2: 0

## 2020-10-05 ASSESSMENT — ENCOUNTER SYMPTOMS: BACK PAIN: 1

## 2020-10-05 NOTE — TELEPHONE ENCOUNTER
Pt was seen in [facility] ED today for severe back pain. They gave her pain meds and muscle relaxers but PT wants to be seen to see if she has a pinched nerve today if possible by Holden Hampton. She said she cannot stand up straight and is in a lot of pain.     Routing encounter per Mi/office

## 2020-10-05 NOTE — PATIENT INSTRUCTIONS
It was my pleasure to meet with you today. Please contact me with any questions or concerns, and please notify myself or our manger if there is anyway we can improve our service in your health care needs. Below I have listed some instructions and information that pertain to today's visit.    -You have been advised to continue all current medication, otherwise not discussed in today's visit  -New medications and refills will been sent and made available at pharmacy or mail away  -Heathy daily diet to include healthy balanced diet with good portions of lean meats and vegetables  -Drink 6-8 glasses of water daily  -Complete  fasting (8-12 hours - water, black coffee, plain tea ok) labs and/any other testing ordered prior to next scheduled followup      Patient is to discontinue use of tramadol as it is not effective. - she has had better response with Vicodin. Norflex discontinued as she is has skelaxin at home. Medication as prescribed  Advised patient on supportive therapies, including rest, relaxation techniques, heat application, weight loss, ergonomic therapies, and refraining from lifting heavy objects. Instructed patient on low back pain ROM exercises. Warning symptoms discussed which would prompt return. Patient Education        Acute Low Back Pain: Exercises  Introduction  Here are some examples of typical rehabilitation exercises for your condition. Start each exercise slowly. Ease off the exercise if you start to have pain. Your doctor or physical therapist will tell you when you can start these exercises and which ones will work best for you. When you are not being active, find a comfortable position for rest. Some people are comfortable on the floor or a medium-firm bed with a small pillow under their head and another under their knees. Some people prefer to lie on their side with a pillow between their knees. Don't stay in one position for too long.   Take short walks (10 to 20 minutes) every 2 to 3 hours. Avoid slopes, hills, and stairs until you feel better. Walk only distances you can manage without pain, especially leg pain. How to do the exercises  Back stretches   1. Get down on your hands and knees on the floor. 2. Relax your head and allow it to droop. Round your back up toward the ceiling until you feel a nice stretch in your upper, middle, and lower back. Hold this stretch for as long as it feels comfortable, or about 15 to 30 seconds. 3. Return to the starting position with a flat back while you are on your hands and knees. 4. Let your back sway by pressing your stomach toward the floor. Lift your buttocks toward the ceiling. 5. Hold this position for 15 to 30 seconds. 6. Repeat 2 to 4 times. Follow-up care is a key part of your treatment and safety. Be sure to make and go to all appointments, and call your doctor if you are having problems. It's also a good idea to know your test results and keep a list of the medicines you take. Where can you learn more? Go to https://[employer]. org and sign in to your [email or web address] account. Enter Y669 in the RoleStar box to learn more about \"Acute Low Back Pain: Exercises. \"     If you do not have an account, please click on the \"Sign Up Now\" link. Current as of: March 2, 2020               Content Version: 12.5  © 4025-9989 [employer], Incorporated. Care instructions adapted under license by [facility] ([facility]). If you have questions about a medical condition or this instruction, always ask your healthcare professional. [place] [address] any warranty or liability for your use of this information. Medication as prescribed  Advised Ibuprofen for pain and inflammation. Advised patient on supportive therapies, including rest, relaxation techniques, heat application, weight loss, ergonomic therapies, and refraining from lifting heavy objects.   Instructed patient on low back pain ROM exercises. Warning symptoms discussed which would prompt return.

## 2020-10-05 NOTE — PROGRESS NOTES
[address] [facility]  [phone]    Date of Visit:  10/5/2020  Patient :  1985   CHIEF COMPLAINT:     Lacey Ruffin is a 38 y.o. female who presents today for an acute visit to be evaluated for the following condition:  Chief Complaint   Patient presents with    Lower Back Pain     Since Friday today is the worse day        REVIEW OF SYSTEM      Review of Systems   Musculoskeletal: Positive for back pain. HISTORY OF PRESENT ILLNESS       ER/ URGENT CARE FOLLOWUP    Patient presents today for follow up after being assessed and evaluated  at [facility] ER on 10/5/2020. Patient presented with complaints of back pain, left. Testing completed on patient while evaluated included xray. The xray which was found to be normal. Patient diagnosed with acute pain and muscle spasm . Discharged to home with norflex and tramado. She reports that since she was seen she has taken taken . Instructed to follow-up with primary care. Patient was not consulted and referred to any other specialty. Synopsis of events prior to presenting to ER. Back Pain   This is a new problem. The current episode started in the past 7 days. The problem occurs constantly. The problem has been rapidly worsening since onset. The pain is present in the lumbar spine. The pain radiates to the left thigh. The pain is at a severity of 10/10. The pain is severe. The pain is the same all the time. The symptoms are aggravated by bending, lying down, standing, twisting, sitting and position (standing straight up hurts the worse). Risk factors include lack of exercise, poor posture, sedentary lifestyle and obesity. She has tried bed rest, ice, heat, muscle relaxant and walking for the symptoms. The treatment provided no relief.        REVIEWED INFORMATION      Allergies   Allergen Reactions    Amoxicillin-Pot Clavulanate Hives     Other reaction(s): rash    Amoxil [Amoxicillin] Hives    Cefdinir Hives and Rash           Frovatriptan Hives           Metoclopramide Other (See Comments)     Other reaction(s): JITTERY/ELEVATED BP        Morphine Itching    Nsaids Other (See Comments)     Patient Gets Stomach Ulcers    Shrimp (Diagnostic) Hives    Shrimp Extract Allergy Skin Test Hives       Patient Active Problem List   Diagnosis    Depression    Arrhythmia    Neurologic cardiac syncope    Migraine    Encounter to establish care    Vitamin D deficiency    Elevated fasting glucose    Hypokalemia    Anxiety       Past Medical History:   Diagnosis Date    Arrhythmia 4/7/2011    Depression 4/7/2011    GERD (gastroesophageal reflux disease)     Gastoric presis     Hyperthyroidism     Migraine 4/7/2011    Neurologic cardiac syncope 4/7/2011       PHYSICAL EXAM   ASS  Vitals:    10/05/20 1248   BP: 118/76   Site: Left Upper Arm   Position: Sitting   Cuff Size: Large Adult   Pulse: 84   Resp: 15   Temp: 97.9 °F (36.6 °C)   TempSrc: Temporal   SpO2: 97%   Weight: 269 lb 9.6 oz (122.3 kg)   Height: 5' 5\" (1.651 m)     Physical Exam  Vitals signs reviewed. Constitutional:       Appearance: Normal appearance. She is not ill-appearing. HENT:      Head: Normocephalic and atraumatic. Eyes:      Extraocular Movements: Extraocular movements intact. Pupils: Pupils are equal, round, and reactive to light. Neck:      Musculoskeletal: Normal range of motion and neck supple. Vascular: No carotid bruit. Cardiovascular:      Rate and Rhythm: Normal rate and regular rhythm. Pulses: Normal pulses. Heart sounds: Normal heart sounds. No murmur. No friction rub. No gallop. Pulmonary:      Effort: Pulmonary effort is normal. No respiratory distress. Breath sounds: Normal breath sounds. No wheezing. Abdominal:      General: Abdomen is flat. Bowel sounds are normal. There is no distension. Palpations: Abdomen is soft. There is no mass. Tenderness:  There is no abdominal tenderness. There is no right CVA tenderness or left CVA tenderness. Musculoskeletal:         General: No swelling. Lumbar back: She exhibits decreased range of motion, tenderness, bony tenderness and swelling. Right upper leg: She exhibits tenderness. Right lower leg: No edema. Left lower leg: No edema. Skin:     General: Skin is warm. Capillary Refill: Capillary refill takes less than 2 seconds. Findings: No erythema, lesion or rash. Neurological:      General: No focal deficit present. Mental Status: She is alert and oriented to person, place, and time. Psychiatric:         Mood and Affect: Mood normal.         Behavior: Behavior normal. Behavior is cooperative. ASSESSMENT/PLAN     1. Sciatica of left side  Start medication      - HYDROcodone-acetaminophen (NORCO) 5-325 MG per tablet; Take 1 tablet by mouth every 6 hours as needed for Pain for up to 5 days. Intended supply: 7 days. Take lowest dose possible to manage pain  Dispense: 20 tablet; Refill: 0  - predniSONE (DELTASONE) 20 MG tablet; Take 3 tablets by mouth daily for 2 days, THEN 2.5 tablets daily for 2 days, THEN 2 tablets daily for 2 days, THEN 1.5 tablets daily for 2 days, THEN 1 tablet daily for 2 days, THEN 0.5 tablets daily for 2 days. Take medication with food. Dispense: 20 tablet; Refill: 0      Return in about 1 week (around 10/12/2020) for recheck symptoms of today's primary complaint. COMMUNICATION:           The best way to find yourself is to lose yourself in the service of others - [id number] [place], Susan. \[facility].\[email or web address]  Office: [phone]   Cell: [phone]    Electronically signed by MADDIE Truong on 10/5/2020 at 1:50 PM

## 2020-10-13 ENCOUNTER — OFFICE VISIT (OUTPATIENT)
Dept: FAMILY MEDICINE CLINIC | Age: 35
End: 2020-10-13
Payer: COMMERCIAL

## 2020-10-13 VITALS
BODY MASS INDEX: 43.23 KG/M2 | DIASTOLIC BLOOD PRESSURE: 80 MMHG | OXYGEN SATURATION: 98 % | SYSTOLIC BLOOD PRESSURE: 120 MMHG | TEMPERATURE: 97.2 F | RESPIRATION RATE: 14 BRPM | HEART RATE: 73 BPM | HEIGHT: 66 IN | WEIGHT: 269 LBS

## 2020-10-13 PROCEDURE — 99214 OFFICE O/P EST MOD 30 MIN: CPT | Performed by: NURSE PRACTITIONER

## 2020-10-13 RX ORDER — POTASSIUM CHLORIDE 750 MG/1
10 CAPSULE, EXTENDED RELEASE ORAL 3 TIMES DAILY
Qty: 90 CAPSULE | Refills: 1 | Status: SHIPPED | OUTPATIENT
Start: 2020-10-13 | End: 2022-05-23

## 2020-10-13 RX ORDER — AMILORIDE HYDROCHLORIDE 5 MG/1
5 TABLET ORAL DAILY
Qty: 30 TABLET | Refills: 3 | Status: SHIPPED | OUTPATIENT
Start: 2020-10-13 | End: 2021-05-12

## 2020-10-13 ASSESSMENT — ENCOUNTER SYMPTOMS
RHINORRHEA: 0
DIARRHEA: 0
COUGH: 0
SHORTNESS OF BREATH: 0
CONSTIPATION: 0
SORE THROAT: 0

## 2020-10-13 ASSESSMENT — PATIENT HEALTH QUESTIONNAIRE - PHQ9
SUM OF ALL RESPONSES TO PHQ9 QUESTIONS 1 & 2: 0
1. LITTLE INTEREST OR PLEASURE IN DOING THINGS: 0
2. FEELING DOWN, DEPRESSED OR HOPELESS: 0
SUM OF ALL RESPONSES TO PHQ QUESTIONS 1-9: 0
SUM OF ALL RESPONSES TO PHQ QUESTIONS 1-9: 0

## 2020-10-13 NOTE — PROGRESS NOTES
[facility] [facility]   [address]   [phone]    10/13/2020     Patient ID: Fredrick West is a 38 y.o. female. CHIEF COMPLAINT:     Fredrick West presents today for her medical conditions/complaints as noted below. Marc Mirza is c/o of Back Pain (follow up )  . REVIEWED:      [x] Past Medical, Family, and Social History was reviewed per writer and does contribute to the patient presenting condition.     [x] Laboratory Results, Vital signs, Imaging, Active Problems, Immunizations, Current/Recently Discontinued Medications, Health Maintenance Activities Due, Referral Notes (if available) were reviewed per writer     [x] Reviewed Depression screening if taken or valid today or any other valid screening tool (others seen below) Interpretation of Total Score DepressionSeverity: 1-4 = Minimal depression, 5-9 = Mild depression, 10-14 = Moderate depression, 15-19 = Moderately severe depression, 20-27 =Severe depression    PHQ Scores 10/13/2020 10/5/2020 7/30/2020 6/16/2020   PHQ2 Score 0 0 0 0   PHQ9 Score 0 0 0 0       Interpretation of Total Score DepressionSeverity: 1-4 = Minimal depression, 5-9 = Mild depression, 10-14 = Moderate depression, 15-19 = Moderately severe depression, 20-27 = Severe depression    Allergies   Allergen Reactions    Amoxicillin-Pot Clavulanate Hives     Other reaction(s): rash    Amoxil [Amoxicillin] Hives    Cefdinir Hives and Rash           Frovatriptan Hives           Metoclopramide Other (See Comments)     Other reaction(s): JITTERY/ELEVATED BP        Morphine Itching    Nsaids Other (See Comments)     Patient Gets Stomach Ulcers    Shrimp (Diagnostic) Hives    Shrimp Extract Allergy Skin Test Hives     Current Outpatient Medications   Medication Sig Dispense Refill    aMILoride (MIDAMOR) 5 MG tablet Take 1 tablet by mouth daily 30 tablet 3    potassium chloride (MICRO-K) 10 MEQ extended release capsule Take 1 capsule by mouth 3 times daily for 30 doses 90 capsule 1    cetirizine (ZYRTEC) 10 MG tablet Take by mouth daily      SUMAtriptan Succinate 6 MG/0.5ML SOAJ Inject as directed as needed      levothyroxine (SYNTHROID) 175 MCG tablet Take 175 mcg by mouth Daily      thyroid (ARMOUR) 65 MG tablet Take 65 mg by mouth daily      topiramate (TOPAMAX) 100 MG tablet Take 200 mg by mouth nightly       Armodafinil (NUVIGIL) 250 MG TABS Take by mouth daily.  FLUoxetine (PROZAC) 20 MG capsule Take 2 capsules by mouth daily 90 capsule 1    pantoprazole (PROTONIX) 40 MG tablet Take 40 mg by mouth daily      ondansetron (ZOFRAN-ODT) 4 MG disintegrating tablet Take 4 mg by mouth every 8 hours as needed for Nausea or Vomiting      Vitamin D (CHOLECALCIFEROL) 1000 UNITS CAPS capsule Take 4,000 Units by mouth 2 times daily      Magnesium Oxide 500 MG TABS Take 500 mg by mouth 2 times daily      promethazine (PHENERGAN) 25 MG tablet Take 1 po q 6 hours PRN 30 tablet 0    fludrocortisone (FLORINEF) 0.1 MG tablet Take 0.1 mg by mouth daily. No current facility-administered medications for this visit.       Past Medical History:   Diagnosis Date    Arrhythmia 2011    Depression 2011    GERD (gastroesophageal reflux disease)     Gastoric presis     Hyperthyroidism     Migraine 2011    Neurologic cardiac syncope 2011      Past Surgical History:   Procedure Laterality Date    APPENDECTOMY      BREAST LUMPECTOMY Left     5 lumps removed out of left breast      SECTION      2 C- Sections     GALLBLADDER SURGERY      GASTRIC BYPASS SURGERY      MICHELLE-EN-Y GASTRIC BYPASS N/A     Patient had to get gastric bypass reversed due to Ulcers removed     THYROIDECTOMY Bilateral     whole thyroid removed      Family History   Problem Relation Age of Onset    Thyroid Disease Paternal Grandfather    [place] [facility] Migraines Mother     Other Mother 43        uterine fibroids, DEXTER/BSO     High Blood Pressure Father     Heart Disease Father     Migraines Sister     Breast Cancer Maternal Cousin         1st cousin once removed     Thyroid Cancer Maternal Cousin     No Known Problems Brother     No Known Problems Sister      Social History     Tobacco Use    Smoking status: Never Smoker    Smokeless tobacco: Never Used   Substance Use Topics    Alcohol use: No            REVIEW OF SYSTEMS:     Review of Systems   Constitutional: Negative for activity change, fatigue and unexpected weight change. HENT: Negative for congestion, ear pain, hearing loss, rhinorrhea and sore throat. Respiratory: Negative for cough and shortness of breath. Cardiovascular: Positive for leg swelling (increased leg swelling). Negative for chest pain and palpitations. Gastrointestinal: Negative for constipation and diarrhea. Musculoskeletal: Negative for arthralgias and gait problem. Neurological: Negative for dizziness, weakness and headaches. Psychiatric/Behavioral: Negative for confusion. The patient is not nervous/anxious. HISTORY OF PRESENT ILLNESS     Back Pain is improved. Swelling of lower extremities - was on aldactone - and treated for low potassium - was on for one month the not refilled. Patient reports that she was feeling much better when taking - blood pressure was controlled with no significant drop      PHYSICAL EXAM:     /80 (Site: Left Upper Arm, Position: Sitting, Cuff Size: Large Adult)   Pulse 73   Temp 97.2 °F (36.2 °C) (Temporal)   Resp 14   Ht 5' 5.5\" (1.664 m)   Wt 269 lb (122 kg)   LMP 09/08/2020 (Approximate)   SpO2 98%   BMI 44.08 kg/m²    Physical Exam  Vitals signs reviewed. Constitutional:       Appearance: Normal appearance. She is not ill-appearing. Cardiovascular:      Rate and Rhythm: Normal rate and regular rhythm. Heart sounds: No murmur. No friction rub. No gallop. Pulmonary:      Effort: Pulmonary effort is normal. No respiratory distress. Breath sounds:  No wheezing. Abdominal:      General: Bowel sounds are normal.      Palpations: Abdomen is soft. Tenderness: There is no abdominal tenderness. Musculoskeletal:      Right lower leg: No edema. Left lower leg: No edema. Skin:     General: Skin is warm. Findings: No erythema, lesion or rash. Neurological:      Mental Status: She is alert. Psychiatric:         Mood and Affect: Mood normal.         Behavior: Behavior is cooperative. ASSESSMENT /PLAN     1. Hypokalemia    - aMILoride (MIDAMOR) 5 MG tablet; Take 1 tablet by mouth daily  Dispense: 30 tablet; Refill: 3  - potassium chloride (MICRO-K) 10 MEQ extended release capsule; Take 1 capsule by mouth 3 times daily for 30 doses  Dispense: 90 capsule; Refill: 1  - Basic Metabolic Panel; Standing    2. Swelling    - aMILoride (MIDAMOR) 5 MG tablet; Take 1 tablet by mouth daily  Dispense: 30 tablet; Refill: 3  - potassium chloride (MICRO-K) 10 MEQ extended release capsule; Take 1 capsule by mouth 3 times daily for 30 doses  Dispense: 90 capsule; Refill: 1  - Basic Metabolic Panel; Standing      Return in about 6 weeks (around 11/24/2020) for symptom check and lab review. COMMUNICATION:     More than 50% of this 30 minute visit was spent discussing Plan of Care in detail including but not limited education, counseling, and coordination of care. The best way to find yourself is to lose yourself in the service of others - [id number] [facility]   [email or web address]  Office: [phone]   Cell: [phone]    Electronically signed by MADDIE Bowman on 10/19/2020 at 1:36 AM

## 2020-10-26 LAB
ALBUMIN SERPL-MCNC: 3.9 G/DL
ALP BLD-CCNC: 66 U/L
ALT SERPL-CCNC: 11 U/L
AST SERPL-CCNC: 15 U/L
BASOPHILS ABSOLUTE: 0.1 /ΜL
BASOPHILS RELATIVE PERCENT: 0.8 %
BILIRUB SERPL-MCNC: 0.2 MG/DL (ref 0.1–1.4)
BUN BLDV-MCNC: 6 MG/DL
CALCIUM SERPL-MCNC: 8.6 MG/DL
CHLORIDE BLD-SCNC: 107 MMOL/L
CO2: 29 MMOL/L
CREAT SERPL-MCNC: 0.79 MG/DL
EOSINOPHILS ABSOLUTE: 0.1 /ΜL
EOSINOPHILS RELATIVE PERCENT: 1.2 %
GLUCOSE FASTING: 103 MG/DL
HCT VFR BLD CALC: 38.5 % (ref 36–46)
HEMOGLOBIN: 13.3 G/DL (ref 12–16)
LYMPHOCYTES ABSOLUTE: 1.3 /ΜL
LYMPHOCYTES RELATIVE PERCENT: 20 %
MCH RBC QN AUTO: 30.4 PG
MCHC RBC AUTO-ENTMCNC: 34.5 G/DL
MCV RBC AUTO: 88 FL
MONOCYTES ABSOLUTE: 0.4 /ΜL
MONOCYTES RELATIVE PERCENT: 6.2 %
NEUTROPHILS ABSOLUTE: 4.8 /ΜL
NEUTROPHILS RELATIVE PERCENT: 71.8 %
PDW BLD-RTO: 12.3 %
PLATELET # BLD: 293 K/ΜL
PMV BLD AUTO: 7.1 FL
POTASSIUM SERPL-SCNC: 3.6 MMOL/L
RBC # BLD: 4.38 10^6/ΜL
SODIUM BLD-SCNC: 140 MMOL/L
TOTAL PROTEIN: 6.4 G/DL (ref 6.4–8.2)
VITAMIN D 25-HYDROXY: 62.4
VITAMIN D2, 25 HYDROXY: NORMAL
VITAMIN D3,25 HYDROXY: NORMAL
WBC # BLD: 6.7 10^3/ML

## 2020-11-04 ENCOUNTER — HOSPITAL ENCOUNTER (OUTPATIENT)
Dept: INTERVENTIONAL RADIOLOGY/VASCULAR | Age: 35
Discharge: HOME | End: 2020-11-06
Payer: COMMERCIAL

## 2020-11-04 PROCEDURE — 2709999900 HC NON-CHARGEABLE SUPPLY

## 2020-11-04 PROCEDURE — 96372 THER/PROPH/DIAG INJ SC/IM: CPT

## 2020-11-04 PROCEDURE — 2500000003 HC RX 250 WO HCPCS: Performed by: INTERNAL MEDICINE

## 2020-11-04 PROCEDURE — 6360000002 HC RX W HCPCS: Performed by: INTERNAL MEDICINE

## 2020-11-04 RX ORDER — WATER 1000 ML/1000ML
1.2 INJECTION, SOLUTION INTRAVENOUS ONCE
Status: COMPLETED | OUTPATIENT
Start: 2020-11-04 | End: 2020-11-04

## 2020-11-04 RX ORDER — WATER 1000 ML/1000ML
1.2 INJECTION, SOLUTION INTRAVENOUS ONCE
Status: CANCELLED | OUTPATIENT
Start: 2020-11-05

## 2020-11-04 RX ADMIN — THYROTROPIN ALFA 0.9 MG: 0.9 INJECTION, POWDER, FOR SOLUTION INTRAMUSCULAR at 14:11

## 2020-11-04 RX ADMIN — WATER 1.2 ML: 100 INJECTION, SOLUTION INTRAVENOUS at 14:11

## 2020-11-04 NOTE — FLOWSHEET NOTE
PATIENT TO IR.  TIMEOUT COMPLETED, ALLERGIES NOTED AND REVIEWED. PATIENT DENIES ANY QUESTIONS. INJECTION GIVEN IN LEFT ARM PER PATIENT REQUEST, COVERED WITH BANDAID. PATIENT TOLERATED WELL. PATIENT AMBULATED PER SELF. NO COMPLICATIONS.

## 2020-11-05 ENCOUNTER — HOSPITAL ENCOUNTER (OUTPATIENT)
Dept: INTERVENTIONAL RADIOLOGY/VASCULAR | Age: 35
Discharge: HOME | End: 2020-11-07
Payer: COMMERCIAL

## 2020-11-05 ENCOUNTER — HOSPITAL ENCOUNTER (OUTPATIENT)
Dept: NUCLEAR MEDICINE | Age: 35
Discharge: HOME | End: 2020-11-07
Payer: COMMERCIAL

## 2020-11-05 PROCEDURE — A9509 IODINE I-123 SOD IODIDE MIL: HCPCS | Performed by: INTERNAL MEDICINE

## 2020-11-05 PROCEDURE — 3430000000 HC RX DIAGNOSTIC RADIOPHARMACEUTICAL: Performed by: INTERNAL MEDICINE

## 2020-11-05 PROCEDURE — 78018 THYROID MET IMAGING BODY: CPT

## 2020-11-05 PROCEDURE — 2500000003 HC RX 250 WO HCPCS: Performed by: INTERNAL MEDICINE

## 2020-11-05 PROCEDURE — 6360000002 HC RX W HCPCS: Performed by: INTERNAL MEDICINE

## 2020-11-05 RX ORDER — WATER 1000 ML/1000ML
1.2 INJECTION, SOLUTION INTRAVENOUS ONCE
Status: COMPLETED | OUTPATIENT
Start: 2020-11-05 | End: 2020-11-05

## 2020-11-05 RX ADMIN — WATER 1.2 ML: 100 INJECTION, SOLUTION INTRAVENOUS at 13:31

## 2020-11-05 RX ADMIN — THYROTROPIN ALFA 0.9 MG: 0.9 INJECTION, POWDER, FOR SOLUTION INTRAMUSCULAR at 13:28

## 2020-11-05 RX ADMIN — Medication 2.3 MILLICURIE: at 13:30

## 2020-11-06 ENCOUNTER — HOSPITAL ENCOUNTER (OUTPATIENT)
Dept: NUCLEAR MEDICINE | Age: 35
Discharge: HOME | End: 2020-11-08
Payer: COMMERCIAL

## 2020-11-20 NOTE — RESULT ENCOUNTER NOTE
Vitamin D numbers are good  Thyroid level numbers are good  Blood sugar is normal - fasting glucose - I did not get A1C results as ordered   Liver function is normal  Kidney function is normal  Blood counts are normal with no indication of anemia or low platelets. Potassium level is good! No changes to plan of care at this time. Continue with any medications you are currently taking, with no change to dosages. I always encourage healthy life style practice including diet low in carbohydrates, hidden sugars, good amount of physical activity, stay hydrated, stress relieve strategies such as meditation and deep breathing, and get a good night sleep.      Still not completed are your A1C and your Lipid Panel

## 2020-11-24 ENCOUNTER — OFFICE VISIT (OUTPATIENT)
Dept: FAMILY MEDICINE CLINIC | Age: 35
End: 2020-11-24
Payer: COMMERCIAL

## 2020-11-24 VITALS
TEMPERATURE: 97.2 F | SYSTOLIC BLOOD PRESSURE: 118 MMHG | DIASTOLIC BLOOD PRESSURE: 74 MMHG | OXYGEN SATURATION: 99 % | HEART RATE: 68 BPM | BODY MASS INDEX: 44.9 KG/M2 | WEIGHT: 274 LBS

## 2020-11-24 PROCEDURE — 99213 OFFICE O/P EST LOW 20 MIN: CPT | Performed by: NURSE PRACTITIONER

## 2020-11-24 RX ORDER — THYROID, PORCINE 60 MG/1
65 TABLET ORAL DAILY
COMMUNITY
End: 2021-01-26

## 2020-11-24 RX ORDER — LEVOTHYROXINE AND LIOTHYRONINE 57; 13.5 UG/1; UG/1
160 TABLET ORAL DAILY
COMMUNITY
End: 2022-05-23 | Stop reason: ALTCHOICE

## 2020-11-24 RX ORDER — LEVOTHYROXINE SODIUM 0.07 MG/1
150 TABLET ORAL DAILY
Qty: 60 TABLET | Refills: 0 | Status: ON HOLD
Start: 2020-11-24 | End: 2021-11-05 | Stop reason: HOSPADM

## 2020-11-24 ASSESSMENT — ENCOUNTER SYMPTOMS
ABDOMINAL PAIN: 0
EYE PAIN: 0
SHORTNESS OF BREATH: 0
CHEST TIGHTNESS: 0
DIARRHEA: 0
SINUS PRESSURE: 0
CONSTIPATION: 0
TROUBLE SWALLOWING: 0
FACIAL SWELLING: 0
WHEEZING: 0

## 2020-11-24 NOTE — PATIENT INSTRUCTIONS
It was my pleasure to meet with you today. Please contact me with any questions or concerns, and please notify myself or our manager if there is anyway we can improve our service in your health care needs.  Below I have listed some instructions and information that pertain to today's visit.    -You have been advised to continue all current medication, otherwise not discussed in today's visit  -New medications and refills will been sent and made available at pharmacy or mail away  -Heathy daily diet to include healthy balanced diet with good portions of lean meats and vegetables  -Drink 6-8 glasses of water daily  -Continue daily exercise with in your physical capacity

## 2020-11-24 NOTE — PROGRESS NOTES
[address] [facility]   [address]   [phone]    11/24/2020     Patient ID: Michael Abbott is a 38 y.o. female. CHIEF COMPLAINT:     Michael Abbott presents today for Back Pain and Discuss Labs (hypokalemia )  .     REVIEWED:        [x] Laboratory Results, Vital signs, Imaging, Active Problems, Immunizations, Current/Recently Discontinued Medications, Health Maintenance Activities Due, Referral Notes (if available) were reviewed per writer     [x] Reviewed Depression screening if taken or valid today or any other valid screening tool (others seen below) Interpretation of Total Score DepressionSeverity: 1-4 = Minimal depression, 5-9 = Mild depression, 10-14 = Moderate depression, 15-19 = Moderately severe depression, 20-27 =Severe depression    PHQ Scores 10/13/2020 10/5/2020 7/30/2020 6/16/2020   PHQ2 Score 0 0 0 0   PHQ9 Score 0 0 0 0       Interpretation of Total Score DepressionSeverity: 1-4 = Minimal depression, 5-9 = Mild depression, 10-14 = Moderate depression, 15-19 = Moderately severe depression, 20-27 = Severe depression    Allergies   Allergen Reactions    Amoxicillin-Pot Clavulanate Hives     Other reaction(s): rash    Amoxil [Amoxicillin] Hives    Cefdinir Hives and Rash           Frovatriptan Hives           Metoclopramide Other (See Comments)     Other reaction(s): JITTERY/ELEVATED BP        Morphine Itching    Nsaids Other (See Comments)     Patient Gets Stomach Ulcers    Shrimp (Diagnostic) Hives    Shrimp Extract Allergy Skin Test Hives     Current Outpatient Medications   Medication Sig Dispense Refill    thyroid (ARMOUR) 65 MG tablet Take 65 mg by mouth daily      thyroid (ARMOUR) 90 MG tablet Take 90 mg by mouth daily      levothyroxine (SYNTHROID) 75 MCG tablet Take 2 tablets by mouth Daily 60 tablet 0    aMILoride (MIDAMOR) 5 MG tablet Take 1 tablet by mouth daily 30 tablet 3    cetirizine (ZYRTEC) 10 MG tablet Take by mouth daily  SUMAtriptan Succinate 6 MG/0.5ML SOAJ Inject as directed as needed      thyroid (ARMOUR) 65 MG tablet Take 65 mg by mouth daily      topiramate (TOPAMAX) 100 MG tablet Take 200 mg by mouth nightly       Armodafinil (NUVIGIL) 250 MG TABS Take by mouth daily.  FLUoxetine (PROZAC) 20 MG capsule Take 2 capsules by mouth daily 90 capsule 1    pantoprazole (PROTONIX) 40 MG tablet Take 40 mg by mouth daily      ondansetron (ZOFRAN-ODT) 4 MG disintegrating tablet Take 4 mg by mouth every 8 hours as needed for Nausea or Vomiting      Vitamin D (CHOLECALCIFEROL) 1000 UNITS CAPS capsule Take 4,000 Units by mouth 2 times daily      Magnesium Oxide 500 MG TABS Take 500 mg by mouth 2 times daily      promethazine (PHENERGAN) 25 MG tablet Take 1 po q 6 hours PRN 30 tablet 0    fludrocortisone (FLORINEF) 0.1 MG tablet Take 0.1 mg by mouth daily.  potassium chloride (MICRO-K) 10 MEQ extended release capsule Take 1 capsule by mouth 3 times daily for 30 doses 90 capsule 1     No current facility-administered medications for this visit.       Past Medical History:   Diagnosis Date    Arrhythmia 2011    Depression 2011    GERD (gastroesophageal reflux disease)     Gastoric presis     Hyperthyroidism     Migraine 2011    Neurologic cardiac syncope 2011      Past Surgical History:   Procedure Laterality Date    APPENDECTOMY      BREAST LUMPECTOMY Left     5 lumps removed out of left breast      SECTION      2 C- Sections     GALLBLADDER SURGERY      GASTRIC BYPASS SURGERY      MICHELLE-EN-Y GASTRIC BYPASS N/A     Patient had to get gastric bypass reversed due to Ulcers removed     THYROIDECTOMY Bilateral     whole thyroid removed      Family History   Problem Relation Age of Onset    Thyroid Disease Paternal Grandfather    [facility] Migraines Mother     Other Mother 43        uterine fibroids, DEXTER/BSO     High Blood Pressure Father     Heart Disease Father     Migraines Sister    [facility] Exam  Vitals signs reviewed. Constitutional:       Appearance: Normal appearance. She is not ill-appearing. HENT:      Head: Normocephalic and atraumatic. Eyes:      Extraocular Movements: Extraocular movements intact. Pupils: Pupils are equal, round, and reactive to light. Neck:      Musculoskeletal: Normal range of motion and neck supple. Vascular: No carotid bruit. Cardiovascular:      Rate and Rhythm: Normal rate and regular rhythm. Pulses: Normal pulses. Heart sounds: Normal heart sounds. Pulmonary:      Effort: Pulmonary effort is normal.      Breath sounds: Normal breath sounds. Abdominal:      General: Abdomen is flat. There is no distension. Palpations: Abdomen is soft. There is no mass. Tenderness: There is no abdominal tenderness. There is no right CVA tenderness or left CVA tenderness. Musculoskeletal: Normal range of motion. General: No swelling or tenderness. Skin:     General: Skin is warm. Capillary Refill: Capillary refill takes less than 2 seconds. Findings: No erythema or rash. Neurological:      General: No focal deficit present. Mental Status: She is alert and oriented to person, place, and time. Psychiatric:         Mood and Affect: Mood normal.         Behavior: Behavior normal.          ASSESSMENT /PLAN     1. Sciatica of left side  Resolved      2. Hypokalemia  -complete labs prior to next appointment  Stable  -restart your medication as prescribed. - Comprehensive Metabolic Panel, Fasting; Future    3. Vitamin D deficiency  -complete labs prior to next appointment    - Vitamin D 25 Hydroxy; Future    4. Elevated fasting glucose  -complete labs prior to next appointment    - Hemoglobin A1C; Future    5. Screening for cardiovascular, respiratory, and genitourinary diseases  -complete labs prior to next appointment    - CBC; Future  - Lipid Panel;  Future      Return in about 2 months (around 1/24/2021) for Annual physical and lab followup. COMMUNICATION:       The best way to find yourself is to lose yourself in the service of others - [phone] [facility]   [email or web address]  Office: [phone]       Electronically signed by MADDIE Cook on 11/30/2020 at 10:14 AM

## 2020-12-14 ENCOUNTER — TELEPHONE (OUTPATIENT)
Dept: FAMILY MEDICINE CLINIC | Age: 35
End: 2020-12-14

## 2020-12-14 RX ORDER — AZITHROMYCIN 250 MG/1
250 TABLET, FILM COATED ORAL SEE ADMIN INSTRUCTIONS
Qty: 6 TABLET | Refills: 0 | Status: SHIPPED | OUTPATIENT
Start: 2020-12-14 | End: 2020-12-19

## 2020-12-21 ENCOUNTER — PATIENT MESSAGE (OUTPATIENT)
Dept: FAMILY MEDICINE CLINIC | Age: 35
End: 2020-12-21

## 2020-12-21 NOTE — LETTER
NOTIFICATION RETURN TO WORK / SCHOOL    12/31/2020    [address] [address]      To Whom It May Concern:    Alfonso Reyes was tested for COVID-19 on 12/20, and the result was positive     She may return to work on 1/2. I recommend:return without restrictions    If there are questions or concerns, please have the patient contact our office.         Sincerely,        Eugune Dancer, APRN - CNP

## 2020-12-21 NOTE — TELEPHONE ENCOUNTER
From: Muriel Stephenson  To: Goldie Ureña, APRN - CNP  Sent: 12/21/2020 8:37 AM EST  Subject: Test Results Question    Good morning, I went to the urgent care last night and was tested positive for covid-19. My worked wanted me to reach out to you to see how long you would like me to be off work. My only Symptoms are a nasty cough and can't taste anything.

## 2020-12-21 NOTE — LETTER
[address] [facility] [address] [address]  Phone: [phone]  Fax: [phone] [address] [place] [address], APRN - CNP        December 29, 2020     Patient: Luan Munoz   YOB: 1985   Date of Visit: 12/21/2020       To Whom It May Concern: It is my medical opinion that Emre Jean may return to work on 1/4/21. If you have any questions or concerns, please don't hesitate to call.     Sincerely,        VIVIEN Cool CNP

## 2020-12-29 ENCOUNTER — TELEPHONE (OUTPATIENT)
Dept: FAMILY MEDICINE CLINIC | Age: 35
End: 2020-12-29

## 2020-12-29 NOTE — TELEPHONE ENCOUNTER
Patient would like to have a return to work letter sent to her my chart stating that she can return to work on 1/4/21, she needs to be able to have access to this note ASAP to get it to her employer before the new year break. Letter pended for approval and editing.

## 2020-12-31 NOTE — TELEPHONE ENCOUNTER
Since not having no symptoms and I can write a letter for you to return to work on Saturday, January 2 is at will be 14 days.  Letter sent in [place]

## 2021-01-14 ENCOUNTER — HOSPITAL ENCOUNTER (OUTPATIENT)
Age: 36
Setting detail: SPECIMEN
Discharge: HOME | End: 2021-01-14
Payer: COMMERCIAL

## 2021-01-14 DIAGNOSIS — E87.6 HYPOKALEMIA: ICD-10-CM

## 2021-01-14 DIAGNOSIS — E55.9 VITAMIN D DEFICIENCY: ICD-10-CM

## 2021-01-14 DIAGNOSIS — Z13.83 SCREENING FOR CARDIOVASCULAR, RESPIRATORY, AND GENITOURINARY DISEASES: ICD-10-CM

## 2021-01-14 DIAGNOSIS — R73.01 ELEVATED FASTING GLUCOSE: ICD-10-CM

## 2021-01-14 DIAGNOSIS — Z13.89 SCREENING FOR CARDIOVASCULAR, RESPIRATORY, AND GENITOURINARY DISEASES: ICD-10-CM

## 2021-01-14 DIAGNOSIS — Z13.6 SCREENING FOR CARDIOVASCULAR, RESPIRATORY, AND GENITOURINARY DISEASES: ICD-10-CM

## 2021-01-14 LAB
ALBUMIN SERPL-MCNC: 4.1 G/DL (ref 3.5–5.2)
ALBUMIN/GLOBULIN RATIO: 1.6 (ref 1–2.5)
ALP BLD-CCNC: 89 U/L (ref 35–104)
ALT SERPL-CCNC: 14 U/L (ref 5–33)
ANION GAP SERPL CALCULATED.3IONS-SCNC: 13 MMOL/L (ref 9–17)
AST SERPL-CCNC: 20 U/L
BILIRUB SERPL-MCNC: 0.44 MG/DL (ref 0.3–1.2)
BUN BLDV-MCNC: 11 MG/DL (ref 6–20)
BUN/CREAT BLD: ABNORMAL (ref 9–20)
CALCIUM SERPL-MCNC: 8.5 MG/DL (ref 8.6–10.4)
CHLORIDE BLD-SCNC: 105 MMOL/L (ref 98–107)
CHOLESTEROL/HDL RATIO: 2.9
CHOLESTEROL: 158 MG/DL
CO2: 22 MMOL/L (ref 20–31)
CREAT SERPL-MCNC: 0.81 MG/DL (ref 0.5–0.9)
ESTIMATED AVERAGE GLUCOSE: 88 MG/DL
GFR AFRICAN AMERICAN: >60 ML/MIN
GFR NON-AFRICAN AMERICAN: >60 ML/MIN
GFR SERPL CREATININE-BSD FRML MDRD: ABNORMAL ML/MIN/{1.73_M2}
GFR SERPL CREATININE-BSD FRML MDRD: ABNORMAL ML/MIN/{1.73_M2}
GLUCOSE FASTING: 86 MG/DL (ref 70–99)
HBA1C MFR BLD: 4.7 % (ref 4–6)
HCT VFR BLD CALC: 38.7 % (ref 36.3–47.1)
HDLC SERPL-MCNC: 54 MG/DL
HEMOGLOBIN: 13 G/DL (ref 11.9–15.1)
LDL CHOLESTEROL: 90 MG/DL (ref 0–130)
MCH RBC QN AUTO: 30.5 PG (ref 25.2–33.5)
MCHC RBC AUTO-ENTMCNC: 33.6 G/DL (ref 28.4–34.8)
MCV RBC AUTO: 90.8 FL (ref 82.6–102.9)
NRBC AUTOMATED: 0 PER 100 WBC
PDW BLD-RTO: 13 % (ref 11.8–14.4)
PLATELET # BLD: 347 K/UL (ref 138–453)
PMV BLD AUTO: 9 FL (ref 8.1–13.5)
POTASSIUM SERPL-SCNC: 3.6 MMOL/L (ref 3.7–5.3)
RBC # BLD: 4.26 M/UL (ref 3.95–5.11)
SODIUM BLD-SCNC: 140 MMOL/L (ref 135–144)
THYROGLOBULIN AB: <20 IU/ML (ref 0–40)
THYROGLOBULIN: <0.2 NG/ML (ref 0–63.4)
THYROXINE, FREE: 1.22 NG/DL (ref 0.93–1.7)
TOTAL PROTEIN: 6.7 G/DL (ref 6.4–8.3)
TRIGL SERPL-MCNC: 71 MG/DL
TSH SERPL DL<=0.05 MIU/L-ACNC: 0.58 MIU/L (ref 0.3–5)
VITAMIN D 25-HYDROXY: 45.1 NG/ML (ref 30–100)
VLDLC SERPL CALC-MCNC: NORMAL MG/DL (ref 1–30)
WBC # BLD: 4.5 K/UL (ref 3.5–11.3)

## 2021-01-26 ENCOUNTER — OFFICE VISIT (OUTPATIENT)
Dept: FAMILY MEDICINE CLINIC | Age: 36
End: 2021-01-26
Payer: COMMERCIAL

## 2021-01-26 VITALS
OXYGEN SATURATION: 98 % | HEART RATE: 65 BPM | HEIGHT: 66 IN | BODY MASS INDEX: 44.84 KG/M2 | WEIGHT: 279 LBS | DIASTOLIC BLOOD PRESSURE: 88 MMHG | SYSTOLIC BLOOD PRESSURE: 128 MMHG | TEMPERATURE: 97.4 F

## 2021-01-26 DIAGNOSIS — K11.6 CYST OF RIGHT PAROTID GLAND: Primary | ICD-10-CM

## 2021-01-26 DIAGNOSIS — L29.9 LOCALIZED PRURITUS: ICD-10-CM

## 2021-01-26 PROCEDURE — 99215 OFFICE O/P EST HI 40 MIN: CPT | Performed by: NURSE PRACTITIONER

## 2021-01-26 RX ORDER — NARATRIPTAN 2.5 MG/1
TABLET ORAL DAILY PRN
COMMUNITY
Start: 2020-12-15

## 2021-01-26 RX ORDER — CYANOCOBALAMIN 1000 UG/ML
INJECTION INTRAMUSCULAR; SUBCUTANEOUS
Status: ON HOLD | COMMUNITY
Start: 2020-12-14 | End: 2021-11-04

## 2021-01-26 RX ORDER — HYDROXYZINE HYDROCHLORIDE 25 MG/1
25 TABLET, FILM COATED ORAL EVERY 8 HOURS PRN
Qty: 30 TABLET | Refills: 1 | Status: SHIPPED | OUTPATIENT
Start: 2021-01-26 | End: 2021-02-26 | Stop reason: SDUPTHER

## 2021-01-26 RX ORDER — BUTALBITAL, ACETAMINOPHEN AND CAFFEINE 50; 325; 40 MG/1; MG/1; MG/1
TABLET ORAL DAILY PRN
COMMUNITY
Start: 2021-01-04

## 2021-01-26 RX ORDER — AZELASTINE 1 MG/ML
2 SPRAY, METERED NASAL 2 TIMES DAILY
COMMUNITY
Start: 2020-12-14

## 2021-01-26 RX ORDER — UBROGEPANT 50 MG/1
100 TABLET ORAL DAILY PRN
COMMUNITY
Start: 2020-12-15

## 2021-01-26 ASSESSMENT — ENCOUNTER SYMPTOMS
EYE PAIN: 0
COUGH: 0
VOMITING: 0
WHEEZING: 0
CONSTIPATION: 0
SINUS PRESSURE: 0
TROUBLE SWALLOWING: 0
DIARRHEA: 0
CHEST TIGHTNESS: 0
FACIAL SWELLING: 0
SORE THROAT: 0
ABDOMINAL PAIN: 0
SHORTNESS OF BREATH: 0

## 2021-01-26 ASSESSMENT — PATIENT HEALTH QUESTIONNAIRE - PHQ9
SUM OF ALL RESPONSES TO PHQ9 QUESTIONS 1 & 2: 0
SUM OF ALL RESPONSES TO PHQ QUESTIONS 1-9: 0
2. FEELING DOWN, DEPRESSED OR HOPELESS: 0
1. LITTLE INTEREST OR PLEASURE IN DOING THINGS: 0

## 2021-01-26 NOTE — PROGRESS NOTES
hPoenix Bowles (:  1985) is a 38 y.o. female,Established patient, here for evaluation of the following chief complaint(s):  Rash, Other (salt craving/dry mouth), and Ankle Pain      ASSESSMENT/PLAN:  1. Cyst of right parotid gland  Comments:  history of   Orders:  -     MRI BRAIN W WO CONTRAST; Future  2. Localized pruritus  Comments:  right cheek  Orders:  -     MRI BRAIN W WO CONTRAST; Future  -     hydrOXYzine (ATARAX) 25 MG tablet; Take 1 tablet by mouth every 8 hours as needed for Itching, Disp-30 tablet, R-1Normal      Return in about 2 weeks (around 2021). SUBJECTIVE/OBJECTIVE:  Patient here for annual work physical.  Patient is currently doing a work offered weight loss program called [employer]. Patient states she is getting exercise in twice weekly. Last Pap- 20    Patient has concerns for increased cravings for sodium. Also stating that she then gets a white tongue and pain in her mouth after eating items that are higher in sodium. Patient also noted her previous history of the parotid gland cyst with surgical intervention, as well as history of thyroidectomy due to malignancy. Patient states that increased sodium craving and pruritus of the right cheek started around the same time within the last 2-3 weeks. 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 The current episode started 1 to 4 weeks ago. The problem occurs intermittently. The problem has been waxing and waning. Pertinent negatives include no abdominal pain, arthralgias, chest pain, congestion, coughing, fatigue, fever, headaches, joint swelling, myalgias, neck pain, numbness, rash, sore throat, vomiting or weakness. Nothing aggravates the symptoms. Treatments tried: benadryl. The treatment provided significant relief.    Ankle Pain The incident occurred more than 1 week ago (rolled right ankle at work with [employer] comp ). The incident occurred at work. 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 The pain is present in the right ankle. The quality of the pain is described as aching. The pain is at a severity of 5/10. The pain is moderate. The pain has been fluctuating since onset. Associated symptoms include muscle weakness. Pertinent negatives include no inability to bear weight, numbness or tingling. She reports no foreign bodies present. Exacerbated by: ankle compression wrap. The treatment provided mild relief. Review of Systems   Constitutional: Negative for activity change, appetite change, fatigue and fever. HENT: Negative for congestion, ear pain, facial swelling, sinus pressure, sore throat and trouble swallowing. Eyes: Negative for pain and visual disturbance. Respiratory: Negative for cough, chest tightness, shortness of breath and wheezing. Cardiovascular: Negative for chest pain, palpitations and leg swelling. Gastrointestinal: Negative for abdominal pain, constipation, diarrhea and vomiting. Endocrine: Negative for polydipsia, polyphagia and polyuria. Genitourinary: Negative for difficulty urinating, frequency and urgency. Musculoskeletal: Negative for arthralgias, joint swelling, myalgias and neck pain. Skin: Negative for rash and wound. Neurological: Negative for tingling, speech difficulty, weakness, numbness and headaches. Hematological: Negative. Psychiatric/Behavioral: Negative for sleep disturbance. The patient is not nervous/anxious. Physical Exam  Constitutional:       Appearance: Normal appearance. She is well-developed. She is not ill-appearing. Eyes:      General:         Right eye: No discharge. Left eye: No discharge. Extraocular Movements: Extraocular movements intact.       Conjunctiva/sclera: Conjunctivae normal.   Neck: Musculoskeletal: Normal range of motion. Thyroid: No thyroid mass. Vascular: No JVD. Pulmonary:      Effort: Pulmonary effort is normal. No respiratory distress. Musculoskeletal:      Right shoulder: She exhibits normal range of motion and no deformity. Left shoulder: She exhibits normal range of motion and no deformity. Skin:     General: Skin is moist.      Coloration: Skin is not cyanotic or jaundiced. Findings: No rash. Neurological:      General: No focal deficit present. Mental Status: She is alert and oriented to person, place, and time. Gait: Gait is intact. Psychiatric:         Attention and Perception: Attention normal. She does not perceive auditory or visual hallucinations. Mood and Affect: Mood normal.         Speech: Speech normal.           On this date 01/26/21 I have spent 40 minutes reviewing previous notes, test results and face to face with the patient discussing the diagnosis and importance of compliance with the treatment plan as well as documenting on the day of the visit. An electronic signature was used to authenticate this note.     --Ponce Munoz, VIVIEN - CNP

## 2021-01-31 NOTE — PATIENT INSTRUCTIONS
It was my pleasure to meet with you today. Please contact me with any questions or concerns, and please notify myself or our manager if there is anyway we can improve our service in your health care needs.  Below I have listed some instructions and information that pertain to today's visit.    -You have been advised to continue all current medication, otherwise not discussed in today's visit  -Heathy daily diet to include low salt and low carbohydrate, low sugar diabetic diet  -Drink 6-8 glasses of water daily  -Complete  non-fasting labs and/any other testing ordered prior to next scheduled followup  -Complete urgent labs and special testing ordered today to be evaluated once result are in and you will be notified or will be discussed at your next appointment

## 2021-02-03 DIAGNOSIS — L29.9 LOCALIZED PRURITUS: Primary | ICD-10-CM

## 2021-02-03 DIAGNOSIS — K11.6 CYST OF RIGHT PAROTID GLAND: ICD-10-CM

## 2021-02-04 ENCOUNTER — HOSPITAL ENCOUNTER (OUTPATIENT)
Dept: MRI IMAGING | Facility: CLINIC | Age: 36
Discharge: HOME | End: 2021-02-06
Payer: COMMERCIAL

## 2021-02-04 DIAGNOSIS — L29.9 LOCALIZED PRURITUS: ICD-10-CM

## 2021-02-04 DIAGNOSIS — K11.6 CYST OF RIGHT PAROTID GLAND: ICD-10-CM

## 2021-02-04 PROCEDURE — 6360000004 HC RX CONTRAST MEDICATION: Performed by: NURSE PRACTITIONER

## 2021-02-04 PROCEDURE — 70543 MRI ORBT/FAC/NCK W/O &W/DYE: CPT

## 2021-02-04 PROCEDURE — A9579 GAD-BASE MR CONTRAST NOS,1ML: HCPCS | Performed by: NURSE PRACTITIONER

## 2021-02-04 RX ADMIN — GADOTERIDOL 20 ML: 279.3 INJECTION, SOLUTION INTRAVENOUS at 15:07

## 2021-02-15 ENCOUNTER — OFFICE VISIT (OUTPATIENT)
Dept: FAMILY MEDICINE CLINIC | Age: 36
End: 2021-02-15
Payer: COMMERCIAL

## 2021-02-15 VITALS
OXYGEN SATURATION: 98 % | HEART RATE: 84 BPM | DIASTOLIC BLOOD PRESSURE: 68 MMHG | SYSTOLIC BLOOD PRESSURE: 124 MMHG | TEMPERATURE: 97.8 F

## 2021-02-15 DIAGNOSIS — D52.0 ANEMIA, MACROCYTIC, NUTRITIONAL: ICD-10-CM

## 2021-02-15 DIAGNOSIS — R68.2 DRY MOUTH: ICD-10-CM

## 2021-02-15 DIAGNOSIS — R63.8 SALT CRAVING: ICD-10-CM

## 2021-02-15 DIAGNOSIS — L29.9 LOCALIZED PRURITUS: Primary | ICD-10-CM

## 2021-02-15 PROCEDURE — 99213 OFFICE O/P EST LOW 20 MIN: CPT | Performed by: NURSE PRACTITIONER

## 2021-02-15 ASSESSMENT — ENCOUNTER SYMPTOMS
SORE THROAT: 0
DIARRHEA: 0
CONSTIPATION: 0
SHORTNESS OF BREATH: 0
COUGH: 0
RHINORRHEA: 0

## 2021-02-15 NOTE — PATIENT INSTRUCTIONS
It was my pleasure to meet with you today. Please contact me with any questions or concerns, and please notify myself or our manager if there is anyway we can improve our service in your health care needs.  Below I have listed some instructions and information that pertain to today's visit.    -You have been advised to continue all current medication, otherwise not discussed in today's visit  -New medications and refills will been sent and made available at pharmacy or mail away  -Heathy daily diet to include low salt and low carbohydrate, low sugar diabetic diet  -Drink 6-8 glasses of water daily

## 2021-02-15 NOTE — PROGRESS NOTES
[address] [facility]   [address]   [phone]    2/15/2021     CHIEF COMPLAINT:     Ara Whittington (:  1985) is a 38 y.o. female, here for evaluation of the following chief complaint(s):  Discuss Labs      REVIEWED INFORMATION      Allergies   Allergen Reactions    Amoxicillin-Pot Clavulanate Hives     Other reaction(s): rash    Amoxil [Amoxicillin] Hives    Cefdinir Hives and Rash           Frovatriptan Hives           Metoclopramide Other (See Comments)     Other reaction(s): JITTERY/ELEVATED BP        Morphine Itching    Nsaids Other (See Comments)     Patient Gets Stomach Ulcers    Shrimp (Diagnostic) Hives    Shrimp Extract Allergy Skin Test Hives       Current Outpatient Medications   Medication Sig Dispense Refill    azelastine (ASTELIN) 0.1 % nasal spray 2 sprays by Nasal route 2 times daily      butalbital-acetaminophen-caffeine (FIORICET, ESGIC) -40 MG per tablet Take by mouth daily as needed      cyanocobalamin 1000 MCG/ML injection       naratriptan (AMERGE) 2.5 MG tablet Take by mouth daily as needed      UBRELVY 50 MG TABS Take by mouth daily as needed for Migraine      hydrOXYzine (ATARAX) 25 MG tablet Take 1 tablet by mouth every 8 hours as needed for Itching 30 tablet 1    thyroid (ARMOUR) 90 MG tablet Take 90 mg by mouth daily      levothyroxine (SYNTHROID) 75 MCG tablet Take 2 tablets by mouth Daily 60 tablet 0    aMILoride (MIDAMOR) 5 MG tablet Take 1 tablet by mouth daily 30 tablet 3    potassium chloride (MICRO-K) 10 MEQ extended release capsule Take 1 capsule by mouth 3 times daily for 30 doses 90 capsule 1    cetirizine (ZYRTEC) 10 MG tablet Take by mouth daily      SUMAtriptan Succinate 6 MG/0.5ML SOAJ Inject as directed as needed      topiramate (TOPAMAX) 100 MG tablet Take 200 mg by mouth nightly       Armodafinil (NUVIGIL) 250 MG TABS Take by mouth daily. Pulmonary:      Effort: Pulmonary effort is normal.      Breath sounds: Normal breath sounds. Abdominal:      General: Abdomen is flat. There is no distension. Palpations: Abdomen is soft. There is no mass. Tenderness: There is no abdominal tenderness. There is no right CVA tenderness or left CVA tenderness. Musculoskeletal: Normal range of motion. General: No swelling or tenderness. Skin:     General: Skin is warm. Capillary Refill: Capillary refill takes less than 2 seconds. Findings: No erythema or rash. Neurological:      General: No focal deficit present. Mental Status: She is alert and oriented to person, place, and time.    Psychiatric:         Mood and Affect: Mood normal.         Behavior: Behavior normal.          HPI: Presents today for evaluation post MRI. Noted the patient on last visit had been complaining of a localized pruritus of the right facial area. Previous history of patient had a cyst or mass removed and excised from that area. Noted that MRI does not show any appearance of any mass or cyst, despite patient being told that they were still in existence we were going to at this point request the MRIs be examined with previous MRIs this will need to be sent back through radiology. I also noted the patient should contact her endocrinologist, however with her history patient reports that patient has been told she will not be seen back for her until her regular timeframe. At this point I am going to pull her cortisol level as patient does have a history of adrenal fatigue for which she has been treated with as needed medication however noted that she is not currently taking medication as she is only been taking told to take this when she feels her nose is dry. Recommend patient at least try this medication see whether or not she noted any difference. Currently the pruritus is helped and alleviated with the hydroxyzine we will continue to monitor and see patient back for follow-up will request additional review of MRIs against her old testing. PROCEDURE/ IN OFFICE TESTING     No in office testing or procedures completed during today's office visit. ASSESSMENT/PLAN/ FOLLOWUP:     1. Localized pruritus  -     Cortisol; Future  2. Salt craving  -     Cortisol; Future  3. Dry mouth  -     Cortisol; Future  4. Anemia, macrocytic, nutritional  -     Vitamin B12; Future  -     Folate; Future      Return in about 3 months (around 5/15/2021) for symptom check and lab review.     COMMUNICATION: On this date 02/15/21 I have spent 20 minutes reviewing previous notes, test results and face to face with the patient discussing the diagnosis and importance of compliance with the treatment plan as well as documenting on the day of the visit. The best way to find yourself is to lose yourself in the service of others - [address] [place] [address]. [address] [facility]   [email or web address]  Office: [phone]     An electronic signature was used to authenticate this note.   Signed by Remus Kehr, APRN-CNP on 2/15/2021 at 11:10 PM

## 2021-02-20 ENCOUNTER — HOSPITAL ENCOUNTER (OUTPATIENT)
Facility: CLINIC | Age: 36
Discharge: HOME | End: 2021-02-20
Payer: COMMERCIAL

## 2021-02-20 DIAGNOSIS — R68.2 DRY MOUTH: ICD-10-CM

## 2021-02-20 DIAGNOSIS — L29.9 LOCALIZED PRURITUS: ICD-10-CM

## 2021-02-20 DIAGNOSIS — R63.8 SALT CRAVING: ICD-10-CM

## 2021-02-20 DIAGNOSIS — D52.0 ANEMIA, MACROCYTIC, NUTRITIONAL: ICD-10-CM

## 2021-02-20 LAB
CORTISOL COLLECTION INFO: NORMAL
CORTISOL: 9.1 UG/DL (ref 2.7–18.4)
FOLATE: 15 NG/ML
VITAMIN B-12: 706 PG/ML (ref 232–1245)

## 2021-02-20 PROCEDURE — 82746 ASSAY OF FOLIC ACID SERUM: CPT

## 2021-02-20 PROCEDURE — 36415 COLL VENOUS BLD VENIPUNCTURE: CPT

## 2021-02-20 PROCEDURE — 82533 TOTAL CORTISOL: CPT

## 2021-02-20 PROCEDURE — 82607 VITAMIN B-12: CPT

## 2021-02-22 ENCOUNTER — TELEPHONE (OUTPATIENT)
Dept: FAMILY MEDICINE CLINIC | Facility: CLINIC | Age: 36
End: 2021-02-22

## 2021-02-22 ENCOUNTER — PATIENT MESSAGE (OUTPATIENT)
Dept: FAMILY MEDICINE CLINIC | Age: 36
End: 2021-02-22

## 2021-02-22 DIAGNOSIS — L29.9 LOCALIZED PRURITUS: ICD-10-CM

## 2021-02-22 DIAGNOSIS — M79.89 MASS OF SOFT TISSUE OF FACE: Primary | ICD-10-CM

## 2021-02-26 RX ORDER — HYDROXYZINE HYDROCHLORIDE 25 MG/1
25 TABLET, FILM COATED ORAL EVERY 8 HOURS PRN
Qty: 30 TABLET | Refills: 1 | Status: SHIPPED | OUTPATIENT
Start: 2021-02-26 | End: 2021-03-28

## 2021-02-26 RX ORDER — DOXYCYCLINE HYCLATE 100 MG
100 TABLET ORAL 2 TIMES DAILY
Qty: 20 TABLET | Refills: 0 | Status: SHIPPED | OUTPATIENT
Start: 2021-02-26 | End: 2021-03-08

## 2021-02-26 NOTE — TELEPHONE ENCOUNTER
This patient had 2 MRI completed at [facility], in recent years for her lump on right and then on Left of face. [id number]. Would you please call the clinic and get those results.

## 2021-03-12 ENCOUNTER — HOSPITAL ENCOUNTER (OUTPATIENT)
Dept: ULTRASOUND IMAGING | Facility: CLINIC | Age: 36
Discharge: HOME | End: 2021-03-14
Payer: COMMERCIAL

## 2021-03-12 DIAGNOSIS — M79.89 MASS OF SOFT TISSUE OF FACE: ICD-10-CM

## 2021-03-12 PROCEDURE — 76536 US EXAM OF HEAD AND NECK: CPT

## 2021-05-03 ENCOUNTER — HOSPITAL ENCOUNTER (OUTPATIENT)
Dept: MRI IMAGING | Age: 36
Discharge: HOME | End: 2021-05-05
Payer: COMMERCIAL

## 2021-05-03 DIAGNOSIS — K11.6 CYST OF RIGHT PAROTID GLAND: ICD-10-CM

## 2021-05-03 DIAGNOSIS — Z86.69 HX OF MIGRAINES: ICD-10-CM

## 2021-05-03 DIAGNOSIS — G43.419 INTRACTABLE HEMIPLEGIC MIGRAINE WITHOUT STATUS MIGRAINOSUS: ICD-10-CM

## 2021-05-03 DIAGNOSIS — L29.9 LOCALIZED PRURITUS: ICD-10-CM

## 2021-05-03 PROCEDURE — 70544 MR ANGIOGRAPHY HEAD W/O DYE: CPT

## 2021-05-03 PROCEDURE — 70551 MRI BRAIN STEM W/O DYE: CPT

## 2021-05-11 DIAGNOSIS — R60.9 SWELLING: ICD-10-CM

## 2021-05-11 DIAGNOSIS — E87.6 HYPOKALEMIA: ICD-10-CM

## 2021-05-12 RX ORDER — AMILORIDE HYDROCHLORIDE 5 MG/1
5 TABLET ORAL DAILY
Qty: 30 TABLET | Refills: 5 | Status: SHIPPED | OUTPATIENT
Start: 2021-05-12 | End: 2023-05-26

## 2021-05-12 NOTE — TELEPHONE ENCOUNTER
LOV 2/15/21  LRF 10/13/20  RTO 3 months    Health Maintenance   Topic Date Due    COVID-19 Vaccine (1) Never done    Cervical cancer screen  06/16/2021 (Originally 5/24/2014)    HIV screen  06/16/2021 (Originally 6/12/1998)    Flu vaccine (Season Ended) 10/05/2021 (Originally 9/1/2021)    A1C test (Diabetic or Prediabetic)  01/14/2022    Potassium monitoring  01/14/2022    Creatinine monitoring  01/14/2022    DTaP/Tdap/Td vaccine (2 - Td) 07/28/2025    Hepatitis A vaccine  Aged Out    Hepatitis B vaccine  Aged Out    Hib vaccine  Aged Out    Meningococcal (ACWY) vaccine  Aged Out    Pneumococcal 0-64 years Vaccine  Aged Out    Varicella vaccine  Discontinued    Hepatitis C screen  Discontinued             (applicable per patient's age: Cancer Screenings, Depression Screening, Fall Risk Screening, Immunizations)    Hemoglobin A1C (%)   Date Value   01/14/2021 4.7   06/25/2020 4.9     LDL Cholesterol (mg/dL)   Date Value   01/14/2021 90     AST (U/L)   Date Value   01/14/2021 20     ALT (U/L)   Date Value   01/14/2021 14     BUN (mg/dL)   Date Value   01/14/2021 11      (goal A1C is < 7)   (goal LDL is <100) need 30-50% reduction from baseline     BP Readings from Last 3 Encounters:   02/15/21 124/68   01/26/21 128/88   11/24/20 118/74    (goal /80)      All Future Testing planned in [facility]:  Lab Frequency Next Occurrence   Basic Metabolic Panel Every 2 Weeks        Next Visit Date:  Future Appointments   Date Time Provider Maryuri Mcdowell   5/26/2021  1:00 PM VIVIEN Paulino CNP [facility]            Patient Active Problem List:     Depression     Arrhythmia     Neurologic cardiac syncope     Migraine     Encounter to establish care     Vitamin D deficiency     Elevated fasting glucose     Hypokalemia     Anxiety

## 2021-05-26 ENCOUNTER — HOSPITAL ENCOUNTER (OUTPATIENT)
Age: 36
Setting detail: SPECIMEN
Discharge: HOME | End: 2021-05-26
Payer: COMMERCIAL

## 2021-05-26 LAB
THYROXINE, FREE: 1.4 NG/DL (ref 0.93–1.7)
TSH SERPL DL<=0.05 MIU/L-ACNC: 0.04 MIU/L (ref 0.3–5)

## 2021-05-27 LAB — THYROGLOBULIN: <0.2 NG/ML (ref 0–63.4)

## 2021-06-04 DIAGNOSIS — D50.9 IRON DEFICIENCY ANEMIA, UNSPECIFIED IRON DEFICIENCY ANEMIA TYPE: Primary | ICD-10-CM

## 2021-06-04 DIAGNOSIS — R79.0 ABNORMAL IRON SATURATION: ICD-10-CM

## 2021-06-08 ENCOUNTER — HOSPITAL ENCOUNTER (OUTPATIENT)
Age: 36
Setting detail: SPECIMEN
Discharge: HOME | End: 2021-06-08
Payer: COMMERCIAL

## 2021-06-08 DIAGNOSIS — D50.9 IRON DEFICIENCY ANEMIA, UNSPECIFIED IRON DEFICIENCY ANEMIA TYPE: ICD-10-CM

## 2021-06-08 DIAGNOSIS — R79.0 ABNORMAL IRON SATURATION: ICD-10-CM

## 2021-06-08 LAB
ABSOLUTE EOS #: 0.11 K/UL (ref 0–0.44)
ABSOLUTE IMMATURE GRANULOCYTE: <0.03 K/UL (ref 0–0.3)
ABSOLUTE LYMPH #: 1.36 K/UL (ref 1.1–3.7)
ABSOLUTE MONO #: 0.47 K/UL (ref 0.1–1.2)
BASOPHILS # BLD: 1 % (ref 0–2)
BASOPHILS ABSOLUTE: 0.07 K/UL (ref 0–0.2)
DIFFERENTIAL TYPE: ABNORMAL
EOSINOPHILS RELATIVE PERCENT: 2 % (ref 1–4)
FERRITIN: 46 UG/L (ref 13–150)
HCT VFR BLD CALC: 38.9 % (ref 36.3–47.1)
HEMOGLOBIN: 12.6 G/DL (ref 11.9–15.1)
IMMATURE GRANULOCYTES: 0 %
IRON SATURATION: 26 % (ref 20–55)
IRON: 66 UG/DL (ref 37–145)
LYMPHOCYTES # BLD: 22 % (ref 24–43)
MCH RBC QN AUTO: 30 PG (ref 25.2–33.5)
MCHC RBC AUTO-ENTMCNC: 32.4 G/DL (ref 28.4–34.8)
MCV RBC AUTO: 92.6 FL (ref 82.6–102.9)
MONOCYTES # BLD: 8 % (ref 3–12)
NRBC AUTOMATED: 0 PER 100 WBC
PDW BLD-RTO: 12.6 % (ref 11.8–14.4)
PLATELET # BLD: 408 K/UL (ref 138–453)
PLATELET ESTIMATE: ABNORMAL
PMV BLD AUTO: 9 FL (ref 8.1–13.5)
RBC # BLD: 4.2 M/UL (ref 3.95–5.11)
RBC # BLD: ABNORMAL 10*6/UL
SEG NEUTROPHILS: 67 % (ref 36–65)
SEGMENTED NEUTROPHILS ABSOLUTE COUNT: 4.27 K/UL (ref 1.5–8.1)
TOTAL IRON BINDING CAPACITY: 251 UG/DL (ref 250–450)
UNSATURATED IRON BINDING CAPACITY: 185 UG/DL (ref 112–347)
WBC # BLD: 6.3 K/UL (ref 3.5–11.3)
WBC # BLD: ABNORMAL 10*3/UL

## 2021-06-14 LAB
SEND OUT REPORT: NORMAL
TEST NAME: NORMAL

## 2021-06-29 ENCOUNTER — OFFICE VISIT (OUTPATIENT)
Dept: FAMILY MEDICINE CLINIC | Age: 36
End: 2021-06-29
Payer: COMMERCIAL

## 2021-06-29 VITALS
BODY MASS INDEX: 44.9 KG/M2 | HEART RATE: 84 BPM | OXYGEN SATURATION: 98 % | TEMPERATURE: 98 F | DIASTOLIC BLOOD PRESSURE: 80 MMHG | SYSTOLIC BLOOD PRESSURE: 120 MMHG | WEIGHT: 274 LBS

## 2021-06-29 DIAGNOSIS — R53.83 FATIGUE, UNSPECIFIED TYPE: Primary | ICD-10-CM

## 2021-06-29 DIAGNOSIS — G47.10 HYPERSOMNOLENCE: ICD-10-CM

## 2021-06-29 PROCEDURE — 99214 OFFICE O/P EST MOD 30 MIN: CPT | Performed by: NURSE PRACTITIONER

## 2021-06-29 SDOH — ECONOMIC STABILITY: FOOD INSECURITY: WITHIN THE PAST 12 MONTHS, THE FOOD YOU BOUGHT JUST DIDN'T LAST AND YOU DIDN'T HAVE MONEY TO GET MORE.: NEVER TRUE

## 2021-06-29 SDOH — ECONOMIC STABILITY: FOOD INSECURITY: WITHIN THE PAST 12 MONTHS, YOU WORRIED THAT YOUR FOOD WOULD RUN OUT BEFORE YOU GOT MONEY TO BUY MORE.: NEVER TRUE

## 2021-06-29 ASSESSMENT — SOCIAL DETERMINANTS OF HEALTH (SDOH): HOW HARD IS IT FOR YOU TO PAY FOR THE VERY BASICS LIKE FOOD, HOUSING, MEDICAL CARE, AND HEATING?: NOT HARD AT ALL

## 2021-06-29 NOTE — PROGRESS NOTES
[address] [facility]   [facility] [address]  [phone]    2021     CHIEF COMPLAINT:     Wan Vidal (:  1985) is a 43 y.o. female, here for evaluation of the following chief complaint(s): Anxiety and Discuss Labs      REVIEWED INFORMATION      Allergies   Allergen Reactions    Amoxicillin-Pot Clavulanate Hives     Other reaction(s): rash    Amoxil [Amoxicillin] Hives    Cefdinir Hives and Rash           Frovatriptan Hives           Metoclopramide Other (See Comments)     Other reaction(s): JITTERY/ELEVATED BP        Morphine Itching    Nsaids Other (See Comments)     Patient Gets Stomach Ulcers    Shrimp (Diagnostic) Hives    Shrimp Extract Allergy Skin Test Hives       Current Outpatient Medications   Medication Sig Dispense Refill    aMILoride (MIDAMOR) 5 MG tablet Take 1 tablet by mouth daily 30 tablet 5    azelastine (ASTELIN) 0.1 % nasal spray 2 sprays by Nasal route 2 times daily      butalbital-acetaminophen-caffeine (FIORICET, ESGIC) -40 MG per tablet Take by mouth daily as needed      naratriptan (AMERGE) 2.5 MG tablet Take by mouth daily as needed      UBRELVY 50 MG TABS Take 100 mg by mouth daily as needed for Migraine       thyroid (ARMOUR) 90 MG tablet Take 90 mg by mouth daily      levothyroxine (SYNTHROID) 75 MCG tablet Take 2 tablets by mouth Daily 60 tablet 0    potassium chloride (MICRO-K) 10 MEQ extended release capsule Take 1 capsule by mouth 3 times daily for 30 doses 90 capsule 1    cetirizine (ZYRTEC) 10 MG tablet Take by mouth daily      SUMAtriptan Succinate 6 MG/0.5ML SOAJ Inject as directed as needed      topiramate (TOPAMAX) 100 MG tablet Take 200 mg by mouth nightly       Armodafinil (NUVIGIL) 250 MG TABS Take by mouth daily.       pantoprazole (PROTONIX) 40 MG tablet Take 40 mg by mouth daily      ondansetron (ZOFRAN-ODT) 4 MG disintegrating tablet Take 4 mg by mouth every 8 hours as needed for Nausea or Vomiting      Vitamin D (CHOLECALCIFEROL) 1000 UNITS CAPS capsule Take 4,000 Units by mouth 2 times daily      Magnesium Oxide 500 MG TABS Take 500 mg by mouth 2 times daily      promethazine (PHENERGAN) 25 MG tablet Take 1 po q 6 hours PRN 30 tablet 0    fludrocortisone (FLORINEF) 0.1 MG tablet Take 0.1 mg by mouth daily.  FLUoxetine (PROZAC) 20 MG capsule TAKE 2 CAPSULES DAILY 180 capsule 1    cyanocobalamin 1000 MCG/ML injection  (Patient not taking: Reported on 6/29/2021)       No current facility-administered medications for this visit. Patient Care Team:  VIVIEN Petersen CNP as PCP - General (Nurse Practitioner)  VIVIEN Petersen CNP as PCP - [facility]  Luiza Jim MD as Consulting Physician (Endocrinology)  Kate Manuel MD as Consulting Physician (Pulmonology)  Willie Galaviz MD as Consulting Physician (Neurology)  Jen Zimmer MD (Bariatric Surgery)  James Alfaro MD (Obstetrics & Gynecology)    REVIEW OF SYSTEMS:     Review of Systems   Constitutional: Positive for fatigue. HISTORY OF PRESENT ILLNESS     Fatigue  This is a chronic problem. The current episode started more than 1 year ago. The problem occurs constantly. The problem has been waxing and waning. Associated symptoms include fatigue. Exacerbated by: everything is making her tired at this point. She has tried sleep and rest for the symptoms. The treatment provided no relief. Difficult amount of time was spent with patient reviewing labs. Discussing overall symptoms of fatigue. She has been seen by her neurologist.  She has undergone multiple sleep study test.  Currently, we are working on dietary changes and evaluating her current stressors. PHYSICAL EXAM:     /80   Pulse 84   Temp 98 °F (36.7 °C) (Temporal)   Wt 274 lb (124.3 kg)   LMP 06/24/2021   SpO2 98%   BMI 44.90 kg/m²      Physical Exam  Vitals reviewed.    Constitutional:       Appearance: Normal appearance. She is not ill-appearing. Cardiovascular:      Rate and Rhythm: Normal rate and regular rhythm. Heart sounds: No murmur heard. No friction rub. No gallop. Pulmonary:      Effort: Pulmonary effort is normal. No respiratory distress. Breath sounds: No wheezing. Abdominal:      General: Bowel sounds are normal.      Palpations: Abdomen is soft. Tenderness: There is no abdominal tenderness. Musculoskeletal:      Right lower leg: No edema. Left lower leg: No edema. Skin:     General: Skin is warm. Findings: No erythema, lesion or rash. Neurological:      Mental Status: She is alert. Psychiatric:         Mood and Affect: Mood normal.         Behavior: Behavior is cooperative. PROCEDURE/ IN OFFICE TESTING     No in office testing or procedures completed during today's office visit. ASSESSMENT/PLAN/ FOLLOWUP:     1. Fatigue, unspecified type  Start tracking dietary factors, that may be leading to fatiue  -healthy diet as discussed  -daily exercise with in physical limitations  -deep breating exercise as discussed  -stress relief measures - daily walk, relaxtion time for your self  2. Hypersomnolence  -continue with current medication as prescribed  Continue to work with neurology     Return for previously scheduled appointment. COMMUNICATION:       On this date 6/29/2021 I have spent 30 minutes reviewing previous notes, test results and face to face with the patient discussing the diagnosis and importance of compliance with the treatment plan as well as documenting on the day of the visit. The best way to find yourself is to lose yourself in the service of others - [address] [place] [address]. [address]57 [facility]   [email or web address]  Office: [phone]     An electronic signature was used to authenticate this note.   Signed by VIVIEN Siddiqui CNP, APRN-CNP on 7/11/2021 at 9:07 PM

## 2021-06-30 DIAGNOSIS — F32.9 MAJOR DEPRESSIVE DISORDER WITH CURRENT ACTIVE EPISODE, UNSPECIFIED DEPRESSION EPISODE SEVERITY, UNSPECIFIED WHETHER RECURRENT: ICD-10-CM

## 2021-07-01 RX ORDER — FLUOXETINE HYDROCHLORIDE 20 MG/1
CAPSULE ORAL
Qty: 180 CAPSULE | Refills: 1 | Status: SHIPPED | OUTPATIENT
Start: 2021-07-01 | End: 2022-05-05

## 2021-07-01 NOTE — TELEPHONE ENCOUNTER
Last visit: 6/29/21  Last Med refill: 6/16/20    Next Visit Date:  Future Appointments   Date Time Provider Maryuri Mcdowell   7/26/2021  2:30 PM VIVIEN Petersen CNP [facility] [address] Maintenance   Topic Date Due    COVID-19 Vaccine (1) 07/01/2021 (Originally 6/12/1995)    Cervical cancer screen  09/01/2021 (Originally 5/24/2014)    HIV screen  09/01/2021 (Originally 6/12/1998)    Flu vaccine (1) 09/01/2021    A1C test (Diabetic or Prediabetic)  01/14/2022    Potassium monitoring  01/14/2022    Creatinine monitoring  01/14/2022    DTaP/Tdap/Td vaccine (2 - Td or Tdap) 07/28/2025    Hepatitis A vaccine  Aged Out    Hepatitis B vaccine  Aged Out    Hib vaccine  Aged Out    Meningococcal (ACWY) vaccine  Aged Out    Pneumococcal 0-64 years Vaccine  Aged Out    Varicella vaccine  Discontinued    Hepatitis C screen  Discontinued       Hemoglobin A1C (%)   Date Value   01/14/2021 4.7   06/25/2020 4.9             ( goal A1C is < 7)   No results found for: LABMICR  LDL Cholesterol (mg/dL)   Date Value   01/14/2021 90       (goal LDL is <100)   AST (U/L)   Date Value   01/14/2021 20     ALT (U/L)   Date Value   01/14/2021 14     BUN (mg/dL)   Date Value   01/14/2021 11     BP Readings from Last 3 Encounters:   06/29/21 120/80   02/15/21 124/68   01/26/21 128/88          (goal 120/80)    All Future Testing planned in [facility]  Lab Frequency Next Occurrence   Basic Metabolic Panel Every 2 Weeks                Patient Active Problem List:     Depression     Arrhythmia     Neurologic cardiac syncope     Migraine     Encounter to establish care     Vitamin D deficiency     Elevated fasting glucose     Hypokalemia     Anxiety

## 2021-07-12 NOTE — PATIENT INSTRUCTIONS
It was my pleasure to meet with you today. Please contact me with any questions or concerns, and please notify myself or our manager if there is anyway we can improve our service in your health care needs.  Below I have listed some instructions and information that pertain to today's visit.    -You have been advised to continue all current medication, otherwise not discussed in today's visit  -New medications and refills will been sent and made available at pharmacy or mail away  -Heathy daily diet to include healthy balanced diet with good portions of lean meats and vegetables  -Drink 6-8 glasses of water daily    Dietary tracking as discussed

## 2021-07-23 ENCOUNTER — TELEPHONE (OUTPATIENT)
Dept: FAMILY MEDICINE CLINIC | Age: 36
End: 2021-07-23

## 2021-07-25 ENCOUNTER — PATIENT MESSAGE (OUTPATIENT)
Dept: FAMILY MEDICINE CLINIC | Age: 36
End: 2021-07-25

## 2021-07-25 DIAGNOSIS — M54.32 SCIATICA OF LEFT SIDE: Primary | ICD-10-CM

## 2021-07-26 RX ORDER — CYCLOBENZAPRINE HCL 10 MG
10 TABLET ORAL NIGHTLY PRN
Qty: 30 TABLET | Refills: 0 | Status: SHIPPED | OUTPATIENT
Start: 2021-07-26 | End: 2021-08-05

## 2021-07-26 NOTE — TELEPHONE ENCOUNTER
From: Joaquin Darby  To: Kevin Moctezuma, APRN - CNP  Sent: 7/25/2021 10:01 PM EDT  Subject: Prescription Question    So I think I know what my problem is and last night I took an muscle relaxer that is prescribed to me but its old and was wondering if you could send a new one to the [employer] so if they do a drug test at work I know ill have an update one. It can be only a pill or two doesn't matter. I have like 4 left in the other bottle. I also know I have an in person vist with you in August but I think I want to talk to you sooner then that.  So can I schedule an E-Vist? Thank you in advance i am sending you a picture of the bottle that Courtney Hutchison prescribed me last year

## 2021-08-10 ENCOUNTER — OFFICE VISIT (OUTPATIENT)
Dept: FAMILY MEDICINE CLINIC | Age: 36
End: 2021-08-10
Payer: COMMERCIAL

## 2021-08-10 VITALS
DIASTOLIC BLOOD PRESSURE: 82 MMHG | HEART RATE: 84 BPM | OXYGEN SATURATION: 98 % | SYSTOLIC BLOOD PRESSURE: 118 MMHG | WEIGHT: 271 LBS | BODY MASS INDEX: 44.41 KG/M2 | TEMPERATURE: 98.5 F

## 2021-08-10 DIAGNOSIS — R20.0 NUMBNESS AND TINGLING OF RIGHT THUMB: ICD-10-CM

## 2021-08-10 DIAGNOSIS — M54.2 NECK PAIN: Primary | ICD-10-CM

## 2021-08-10 DIAGNOSIS — R20.2 NUMBNESS AND TINGLING OF RIGHT THUMB: ICD-10-CM

## 2021-08-10 PROCEDURE — 99214 OFFICE O/P EST MOD 30 MIN: CPT | Performed by: NURSE PRACTITIONER

## 2021-08-10 ASSESSMENT — ENCOUNTER SYMPTOMS
CONSTIPATION: 0
SHORTNESS OF BREATH: 0
DIARRHEA: 0
COUGH: 0
SORE THROAT: 0
RHINORRHEA: 0

## 2021-08-10 NOTE — PROGRESS NOTES
[facility]   [address]  [phone]    8/10/2021     CHIEF COMPLAINT:     Michael Abbott (:  1985) is a 43 y.o. female, here for evaluation of the following chief complaint(s):  Hand Pain (numbness)      REVIEWED INFORMATION      Allergies   Allergen Reactions    Amoxicillin-Pot Clavulanate Hives     Other reaction(s): rash    Amoxil [Amoxicillin] Hives    Cefdinir Hives and Rash           Frovatriptan Hives           Metoclopramide Other (See Comments)     Other reaction(s): JITTERY/ELEVATED BP        Morphine Itching    Nsaids Other (See Comments)     Patient Gets Stomach Ulcers    Shrimp (Diagnostic) Hives    Shrimp Extract Allergy Skin Test Hives       Current Outpatient Medications   Medication Sig Dispense Refill    FLUoxetine (PROZAC) 20 MG capsule TAKE 2 CAPSULES DAILY (Patient taking differently: 20 mg daily ) 180 capsule 1    aMILoride (MIDAMOR) 5 MG tablet Take 1 tablet by mouth daily 30 tablet 5    azelastine (ASTELIN) 0.1 % nasal spray 2 sprays by Nasal route 2 times daily      butalbital-acetaminophen-caffeine (FIORICET, ESGIC) -40 MG per tablet Take by mouth daily as needed      cyanocobalamin 1000 MCG/ML injection       naratriptan (AMERGE) 2.5 MG tablet Take by mouth daily as needed      UBRELVY 50 MG TABS Take 100 mg by mouth daily as needed for Migraine       thyroid (ARMOUR) 90 MG tablet Take 90 mg by mouth daily      levothyroxine (SYNTHROID) 75 MCG tablet Take 2 tablets by mouth Daily 60 tablet 0    potassium chloride (MICRO-K) 10 MEQ extended release capsule Take 1 capsule by mouth 3 times daily for 30 doses 90 capsule 1    cetirizine (ZYRTEC) 10 MG tablet Take by mouth daily      SUMAtriptan Succinate 6 MG/0.5ML SOAJ Inject as directed as needed      topiramate (TOPAMAX) 100 MG tablet Take 200 mg by mouth nightly       Armodafinil (NUVIGIL) 250 MG TABS Take by mouth daily.       pantoprazole (PROTONIX) 40 MG tablet Take 40 mg by mouth daily      ondansetron (ZOFRAN-ODT) 4 MG disintegrating tablet Take 4 mg by mouth every 8 hours as needed for Nausea or Vomiting      Vitamin D (CHOLECALCIFEROL) 1000 UNITS CAPS capsule Take 4,000 Units by mouth 2 times daily      Magnesium Oxide 500 MG TABS Take 500 mg by mouth 2 times daily      promethazine (PHENERGAN) 25 MG tablet Take 1 po q 6 hours PRN 30 tablet 0    fludrocortisone (FLORINEF) 0.1 MG tablet Take 0.1 mg by mouth daily. No current facility-administered medications for this visit. Patient Care Team:  VIVIEN Anaya CNP as PCP - General (Nurse Practitioner)  VIVIEN Anaya CNP as PCP - [facility]  Arne Severs, MD as Consulting Physician (Endocrinology)  Feliz Jade MD as Consulting Physician (Pulmonology)  Luz Miranda MD as Consulting Physician (Neurology)  Dolly Solitario MD (Bariatric Surgery)  Santiago Hartley MD (Obstetrics & Gynecology)    REVIEW OF SYSTEMS:     Review of Systems   Constitutional: Negative for activity change, fatigue and unexpected weight change. HENT: Negative for congestion, ear pain, hearing loss, rhinorrhea and sore throat. Respiratory: Negative for cough and shortness of breath. Cardiovascular: Negative for chest pain, palpitations and leg swelling. Gastrointestinal: Negative for constipation and diarrhea. Musculoskeletal: Negative for arthralgias and gait problem. Neurological: Negative for dizziness, weakness and headaches. Psychiatric/Behavioral: Negative for confusion. The patient is not nervous/anxious. HISTORY OF PRESENT ILLNESS     Patient reports that she has been experiencing increased numbness and tingling of the right arm and hand - specifically the thumb, index, and middle finger.  Reports that it has been keeping her awake at night, as she willl wake up with right arm numb and tingling -   Patient trailed some flexeril 10 mg which helped stop numbness of the index and middle finger. However patient continues to have numbness of the thumb. She reports pinpoint clavicular tenderness and right shoulder pain at times. She does reports some mild muscle spasms at time. PHYSICAL EXAM:     /82   Pulse 84   Temp 98.5 °F (36.9 °C) (Temporal)   Wt 271 lb (122.9 kg)   LMP 08/05/2021   SpO2 98%   BMI 44.41 kg/m²      Physical Exam  Constitutional:       Appearance: Normal appearance. She is well-developed. She is not ill-appearing. Eyes:      General:         Right eye: No discharge. Left eye: No discharge. Extraocular Movements: Extraocular movements intact. Conjunctiva/sclera: Conjunctivae normal.   Neck:      Thyroid: No thyroid mass. Vascular: No JVD. Pulmonary:      Effort: Pulmonary effort is normal. No respiratory distress. Musculoskeletal:      Right shoulder: No deformity. Normal range of motion. Left shoulder: No deformity. Normal range of motion. Arms:       Cervical back: Normal range of motion. Skin:     General: Skin is moist.      Coloration: Skin is not cyanotic or jaundiced. Findings: No rash. Neurological:      General: No focal deficit present. Mental Status: She is alert and oriented to person, place, and time. Gait: Gait is intact. Psychiatric:         Attention and Perception: Attention normal. She does not perceive auditory or visual hallucinations. Mood and Affect: Mood normal.         Speech: Speech normal.          PROCEDURE/ IN OFFICE TESTING/ LAB REVIEW     No in office testing or procedures completed during today's office visit. ASSESSMENT/PLAN/ FOLLOWUP:     PLEASE NOTE THAT ANY DISCONTINUATION OF MEDICATIONS OR MEDICAL SUPPLIES REFLECTED IN TODAY'S VISIT SUMMARY  MAY NOT HAVE COMPLETED AS A CHANGE IN YOUR PLAN OF CARE.  THESE CHANGES MAY HAVE ONLY BEEN DONE SO IN ORDER TO CLEAN UP LIST FROM DUPLICATIONS OR MISCELLANEOUS SUPPLIES ONLY NEEDED PERIODIC REORDERS. DO NOT DISCONTINUE MEDICATIONS LISTED UNLESS SPECIFICALLY DISCUSSED IN YOUR APPOINTMENT WITH PROVIDER OR SPECIALIST, IF YOU HAVE AN QUESTIONS, PLEASE CONTACT YOUR PROVIDER FOR CLARIFICATION IF NOT ADDRESSED IN YOUR PLAN OF CARE. 1. Neck pain  Continue flexeril  See chiropractic  Comments:  right radiculopathy   Orders:  -     XR Cervical Spine 2 or 3 VW; Future  -     External Referral To Chiropractic  2. Numbness and tingling of right thumb  -     XR Cervical Spine 2 or 3 VW; Future  -     External Referral To Chiropractic      Return if symptoms worsen or fail to improve, for previously scheduled appointment. COMMUNICATION:             The best way to find yourself is to lose yourself in the service of others - [address] [place] [address]. [address] [place] [address]   [email or web address]  Office: [phone]     An electronic signature was used to authenticate this note.   Signed by VIVIEN Siddiqui CNP, APRN-CNP on 8/10/2021 at 5:07 PM

## 2021-10-23 ENCOUNTER — HOSPITAL ENCOUNTER (OUTPATIENT)
Age: 36
Discharge: HOME | End: 2021-10-23
Payer: COMMERCIAL

## 2021-10-23 LAB — POTASSIUM SERPL-SCNC: 3.4 MMOL/L (ref 3.7–5.3)

## 2021-10-23 PROCEDURE — 82088 ASSAY OF ALDOSTERONE: CPT

## 2021-10-23 PROCEDURE — 84244 ASSAY OF RENIN: CPT

## 2021-10-23 PROCEDURE — 84132 ASSAY OF SERUM POTASSIUM: CPT

## 2021-10-23 PROCEDURE — 36415 COLL VENOUS BLD VENIPUNCTURE: CPT

## 2021-10-25 ENCOUNTER — HOSPITAL ENCOUNTER (OUTPATIENT)
Age: 36
Discharge: HOME | End: 2021-10-25
Payer: COMMERCIAL

## 2021-10-25 LAB — ADRENOCORTICOTROPIC HORMONE: 14 PG/ML (ref 6–58)

## 2021-10-25 PROCEDURE — 36415 COLL VENOUS BLD VENIPUNCTURE: CPT

## 2021-10-25 PROCEDURE — 82024 ASSAY OF ACTH: CPT

## 2021-10-27 LAB
ALDOSTERONE COMMENT: NORMAL
ALDOSTERONE: 7.2 NG/DL

## 2021-11-01 ENCOUNTER — HOSPITAL ENCOUNTER (OUTPATIENT)
Age: 36
Discharge: HOME | DRG: 644 | End: 2021-11-01
Payer: COMMERCIAL

## 2021-11-01 LAB — POTASSIUM SERPL-SCNC: 3.1 MMOL/L (ref 3.7–5.3)

## 2021-11-01 PROCEDURE — 84132 ASSAY OF SERUM POTASSIUM: CPT

## 2021-11-01 PROCEDURE — 36415 COLL VENOUS BLD VENIPUNCTURE: CPT

## 2021-11-02 ENCOUNTER — PATIENT MESSAGE (OUTPATIENT)
Dept: FAMILY MEDICINE CLINIC | Age: 36
End: 2021-11-02

## 2021-11-02 DIAGNOSIS — R11.0 NAUSEA: Primary | ICD-10-CM

## 2021-11-02 LAB
RENIN ACTIVITY: 0.7 NG/ML/HR
RENIN COMMENT: NORMAL

## 2021-11-03 RX ORDER — ONDANSETRON 4 MG/1
4 TABLET, ORALLY DISINTEGRATING ORAL EVERY 8 HOURS PRN
Qty: 30 TABLET | Refills: 0 | Status: SHIPPED | OUTPATIENT
Start: 2021-11-03 | End: 2021-12-21 | Stop reason: SDUPTHER

## 2021-11-03 NOTE — TELEPHONE ENCOUNTER
From: José Miguel Aiken  To: Jono Lee, APRN - CNP  Sent: 11/2/2021 9:46 PM EDT  Subject: Prescription Question    I have been feeling really nauseous lately and have taken all of my ondasentron. I only have 1 left. Is there a way you can send some to [facility] for me?

## 2021-11-03 NOTE — TELEPHONE ENCOUNTER
LOV 6/29/21  LRF No hx on file  RTO    Health Maintenance   Topic Date Due    COVID-19 Vaccine (1) Never done    HIV screen  Never done    Cervical cancer screen  Never done    Flu vaccine (1) 09/01/2021    A1C test (Diabetic or Prediabetic)  01/14/2022    Creatinine monitoring  01/14/2022    Potassium monitoring  11/01/2022    DTaP/Tdap/Td vaccine (2 - Td or Tdap) 07/28/2025    Hepatitis A vaccine  Aged Out    Hepatitis B vaccine  Aged Out    Hib vaccine  Aged Out    Meningococcal (ACWY) vaccine  Aged Out    Pneumococcal 0-64 years Vaccine  Aged Out    Varicella vaccine  Discontinued    Hepatitis C screen  Discontinued             (applicable per patient's age: Cancer Screenings, Depression Screening, Fall Risk Screening, Immunizations)    Hemoglobin A1C (%)   Date Value   01/14/2021 4.7   06/25/2020 4.9     LDL Cholesterol (mg/dL)   Date Value   01/14/2021 90     AST (U/L)   Date Value   01/14/2021 20     ALT (U/L)   Date Value   01/14/2021 14     BUN (mg/dL)   Date Value   01/14/2021 11      (goal A1C is < 7)   (goal LDL is <100) need 30-50% reduction from baseline     BP Readings from Last 3 Encounters:   08/10/21 118/82   06/29/21 120/80   02/15/21 124/68    (goal /80)      All Future Testing planned in [facility]:  Lab Frequency Next Occurrence   XR Cervical Spine 2 or 3 VW Once 07/01/2022       Next Visit Date:  No future appointments.          Patient Active Problem List:     Depression     Arrhythmia     Neurologic cardiac syncope     Migraine     Encounter to establish care     Vitamin D deficiency     Elevated fasting glucose     Hypokalemia     Anxiety

## 2021-11-04 ENCOUNTER — APPOINTMENT (OUTPATIENT)
Dept: GENERAL RADIOLOGY | Age: 36
DRG: 644 | End: 2021-11-04
Payer: COMMERCIAL

## 2021-11-04 ENCOUNTER — APPOINTMENT (OUTPATIENT)
Dept: CT IMAGING | Age: 36
DRG: 644 | End: 2021-11-04
Payer: COMMERCIAL

## 2021-11-04 ENCOUNTER — HOSPITAL ENCOUNTER (INPATIENT)
Age: 36
LOS: 1 days | Discharge: HOME | DRG: 644 | End: 2021-11-05
Attending: SPECIALIST | Admitting: INTERNAL MEDICINE
Payer: COMMERCIAL

## 2021-11-04 DIAGNOSIS — R07.9 CHEST PAIN, UNSPECIFIED TYPE: Primary | ICD-10-CM

## 2021-11-04 DIAGNOSIS — E87.6 HYPOKALEMIA: ICD-10-CM

## 2021-11-04 DIAGNOSIS — R00.1 BRADYCARDIA: ICD-10-CM

## 2021-11-04 DIAGNOSIS — I10 ESSENTIAL HYPERTENSION: ICD-10-CM

## 2021-11-04 PROBLEM — D50.9 IRON DEFICIENCY ANEMIA: Status: ACTIVE | Noted: 2020-03-16

## 2021-11-04 PROBLEM — C73 MALIGNANT TUMOR OF THYROID GLAND (HCC): Status: ACTIVE | Noted: 2020-03-16

## 2021-11-04 PROBLEM — E05.20 ADENOMATOUS GOITER, TOXIC OR WITH HYPERTHYROIDISM: Status: ACTIVE | Noted: 2020-03-16

## 2021-11-04 PROBLEM — Z98.84 HISTORY OF ROUX-EN-Y GASTRIC BYPASS: Status: ACTIVE | Noted: 2020-07-09

## 2021-11-04 PROBLEM — E89.0 POSTOPERATIVE HYPOTHYROIDISM: Status: ACTIVE | Noted: 2021-11-04

## 2021-11-04 LAB
ABSOLUTE EOS #: 0.1 K/UL (ref 0–0.4)
ABSOLUTE IMMATURE GRANULOCYTE: NORMAL K/UL (ref 0–0.3)
ABSOLUTE LYMPH #: 1.3 K/UL (ref 1–4.8)
ABSOLUTE MONO #: 0.3 K/UL (ref 0.1–1.2)
ANION GAP SERPL CALCULATED.3IONS-SCNC: 9 MMOL/L (ref 9–17)
ANION GAP SERPL CALCULATED.3IONS-SCNC: 9 MMOL/L (ref 9–17)
BASOPHILS # BLD: 1 % (ref 0–2)
BASOPHILS ABSOLUTE: 0.1 K/UL (ref 0–0.2)
BUN BLDV-MCNC: 5 MG/DL (ref 6–20)
BUN BLDV-MCNC: 5 MG/DL (ref 6–20)
BUN/CREAT BLD: ABNORMAL (ref 9–20)
BUN/CREAT BLD: ABNORMAL (ref 9–20)
CALCIUM SERPL-MCNC: 8.7 MG/DL (ref 8.6–10.4)
CALCIUM SERPL-MCNC: 8.7 MG/DL (ref 8.6–10.4)
CHLORIDE BLD-SCNC: 100 MMOL/L (ref 98–107)
CHLORIDE BLD-SCNC: 98 MMOL/L (ref 98–107)
CHOLESTEROL/HDL RATIO: 3.6
CHOLESTEROL: 242 MG/DL
CO2: 28 MMOL/L (ref 20–31)
CO2: 31 MMOL/L (ref 20–31)
CORTISOL COLLECTION INFO: NORMAL
CORTISOL: 7.9 UG/DL (ref 2.7–18.4)
CREAT SERPL-MCNC: 0.8 MG/DL (ref 0.5–0.9)
CREAT SERPL-MCNC: 0.85 MG/DL (ref 0.5–0.9)
D-DIMER QUANTITATIVE: 0.57 MG/L FEU
DIFFERENTIAL TYPE: NORMAL
EOSINOPHILS RELATIVE PERCENT: 3 % (ref 1–4)
GFR AFRICAN AMERICAN: >60 ML/MIN
GFR AFRICAN AMERICAN: >60 ML/MIN
GFR NON-AFRICAN AMERICAN: >60 ML/MIN
GFR NON-AFRICAN AMERICAN: >60 ML/MIN
GFR SERPL CREATININE-BSD FRML MDRD: ABNORMAL ML/MIN/{1.73_M2}
GLUCOSE BLD-MCNC: 117 MG/DL (ref 70–99)
GLUCOSE BLD-MCNC: 90 MG/DL (ref 70–99)
HCT VFR BLD CALC: 40.3 % (ref 36–46)
HDLC SERPL-MCNC: 67 MG/DL
HEMOGLOBIN: 13.9 G/DL (ref 12–16)
IMMATURE GRANULOCYTES: NORMAL %
LDL CHOLESTEROL: 157 MG/DL (ref 0–130)
LV EF: 65 %
LVEF MODALITY: NORMAL
LYMPHOCYTES # BLD: 24 % (ref 24–44)
MAGNESIUM: 2 MG/DL (ref 1.6–2.6)
MAGNESIUM: 2 MG/DL (ref 1.6–2.6)
MCH RBC QN AUTO: 30.4 PG (ref 26–34)
MCHC RBC AUTO-ENTMCNC: 34.5 G/DL (ref 31–37)
MCV RBC AUTO: 88 FL (ref 80–100)
MONOCYTES # BLD: 6 % (ref 2–11)
NRBC AUTOMATED: NORMAL PER 100 WBC
PDW BLD-RTO: 13.1 % (ref 12.5–15.4)
PLATELET # BLD: 373 K/UL (ref 140–450)
PLATELET ESTIMATE: NORMAL
PMV BLD AUTO: 7 FL (ref 6–12)
POTASSIUM SERPL-SCNC: 3.1 MMOL/L (ref 3.7–5.3)
POTASSIUM SERPL-SCNC: 3.7 MMOL/L (ref 3.7–5.3)
RBC # BLD: 4.58 M/UL (ref 4–5.2)
RBC # BLD: NORMAL 10*6/UL
SARS-COV-2, RAPID: NOT DETECTED
SEG NEUTROPHILS: 66 % (ref 36–66)
SEGMENTED NEUTROPHILS ABSOLUTE COUNT: 3.5 K/UL (ref 1.8–7.7)
SODIUM BLD-SCNC: 137 MMOL/L (ref 135–144)
SODIUM BLD-SCNC: 138 MMOL/L (ref 135–144)
SPECIMEN DESCRIPTION: NORMAL
T3 FREE: 0.97 PG/ML (ref 2.02–4.43)
THYROXINE, FREE: 0.23 NG/DL (ref 0.93–1.7)
TRIGL SERPL-MCNC: 88 MG/DL
TROPONIN INTERP: NORMAL
TROPONIN INTERP: NORMAL
TROPONIN T: NORMAL NG/ML
TROPONIN T: NORMAL NG/ML
TROPONIN, HIGH SENSITIVITY: 6 NG/L (ref 0–14)
TROPONIN, HIGH SENSITIVITY: <6 NG/L (ref 0–14)
TSH SERPL DL<=0.05 MIU/L-ACNC: 59.71 MIU/L (ref 0.3–5)
VLDLC SERPL CALC-MCNC: ABNORMAL MG/DL (ref 1–30)
WBC # BLD: 5.3 K/UL (ref 3.5–11)
WBC # BLD: NORMAL 10*3/UL

## 2021-11-04 PROCEDURE — APPSS180 APP SPLIT SHARED TIME > 60 MINUTES: Performed by: NURSE PRACTITIONER

## 2021-11-04 PROCEDURE — 2580000003 HC RX 258: Performed by: NURSE PRACTITIONER

## 2021-11-04 PROCEDURE — 6360000002 HC RX W HCPCS: Performed by: NURSE PRACTITIONER

## 2021-11-04 PROCEDURE — G0378 HOSPITAL OBSERVATION PER HR: HCPCS

## 2021-11-04 PROCEDURE — 93306 TTE W/DOPPLER COMPLETE: CPT

## 2021-11-04 PROCEDURE — 2580000003 HC RX 258: Performed by: EMERGENCY MEDICINE

## 2021-11-04 PROCEDURE — 84481 FREE ASSAY (FT-3): CPT

## 2021-11-04 PROCEDURE — 87635 SARS-COV-2 COVID-19 AMP PRB: CPT

## 2021-11-04 PROCEDURE — 6360000004 HC RX CONTRAST MEDICATION: Performed by: EMERGENCY MEDICINE

## 2021-11-04 PROCEDURE — 82533 TOTAL CORTISOL: CPT

## 2021-11-04 PROCEDURE — 6370000000 HC RX 637 (ALT 250 FOR IP): Performed by: NURSE PRACTITIONER

## 2021-11-04 PROCEDURE — 2500000003 HC RX 250 WO HCPCS: Performed by: NURSE PRACTITIONER

## 2021-11-04 PROCEDURE — 83735 ASSAY OF MAGNESIUM: CPT

## 2021-11-04 PROCEDURE — 96374 THER/PROPH/DIAG INJ IV PUSH: CPT

## 2021-11-04 PROCEDURE — 84484 ASSAY OF TROPONIN QUANT: CPT

## 2021-11-04 PROCEDURE — 71260 CT THORAX DX C+: CPT

## 2021-11-04 PROCEDURE — 36415 COLL VENOUS BLD VENIPUNCTURE: CPT

## 2021-11-04 PROCEDURE — 93005 ELECTROCARDIOGRAM TRACING: CPT | Performed by: SPECIALIST

## 2021-11-04 PROCEDURE — 1210000000 HC MED SURG R&B

## 2021-11-04 PROCEDURE — 96372 THER/PROPH/DIAG INJ SC/IM: CPT

## 2021-11-04 PROCEDURE — 80048 BASIC METABOLIC PNL TOTAL CA: CPT

## 2021-11-04 PROCEDURE — 85025 COMPLETE CBC W/AUTO DIFF WBC: CPT

## 2021-11-04 PROCEDURE — 99285 EMERGENCY DEPT VISIT HI MDM: CPT

## 2021-11-04 PROCEDURE — 93005 ELECTROCARDIOGRAM TRACING: CPT | Performed by: INTERNAL MEDICINE

## 2021-11-04 PROCEDURE — 99222 1ST HOSP IP/OBS MODERATE 55: CPT | Performed by: INTERNAL MEDICINE

## 2021-11-04 PROCEDURE — 80061 LIPID PANEL: CPT

## 2021-11-04 PROCEDURE — 6370000000 HC RX 637 (ALT 250 FOR IP): Performed by: SPECIALIST

## 2021-11-04 PROCEDURE — 84443 ASSAY THYROID STIM HORMONE: CPT

## 2021-11-04 PROCEDURE — 85379 FIBRIN DEGRADATION QUANT: CPT

## 2021-11-04 PROCEDURE — 84439 ASSAY OF FREE THYROXINE: CPT

## 2021-11-04 RX ORDER — ONDANSETRON 4 MG/1
4 TABLET, ORALLY DISINTEGRATING ORAL EVERY 8 HOURS PRN
Status: DISCONTINUED | OUTPATIENT
Start: 2021-11-04 | End: 2021-11-05 | Stop reason: HOSPADM

## 2021-11-04 RX ORDER — LEVOTHYROXINE SODIUM ANHYDROUS 100 UG/5ML
100 INJECTION, POWDER, LYOPHILIZED, FOR SOLUTION INTRAVENOUS ONCE
Status: COMPLETED | OUTPATIENT
Start: 2021-11-04 | End: 2021-11-04

## 2021-11-04 RX ORDER — BUTALBITAL, ACETAMINOPHEN AND CAFFEINE 50; 325; 40 MG/1; MG/1; MG/1
1 TABLET ORAL DAILY PRN
Status: DISCONTINUED | OUTPATIENT
Start: 2021-11-04 | End: 2021-11-05 | Stop reason: HOSPADM

## 2021-11-04 RX ORDER — ACETAMINOPHEN 325 MG/1
650 TABLET ORAL EVERY 6 HOURS PRN
Status: DISCONTINUED | OUTPATIENT
Start: 2021-11-04 | End: 2021-11-05 | Stop reason: HOSPADM

## 2021-11-04 RX ORDER — SPIRONOLACTONE 25 MG/1
12.5 TABLET ORAL DAILY
Status: DISCONTINUED | OUTPATIENT
Start: 2021-11-05 | End: 2021-11-05 | Stop reason: HOSPADM

## 2021-11-04 RX ORDER — SUMATRIPTAN 6 MG/.5ML
6 INJECTION, SOLUTION SUBCUTANEOUS ONCE
Status: COMPLETED | OUTPATIENT
Start: 2021-11-04 | End: 2021-11-04

## 2021-11-04 RX ORDER — LISINOPRIL 5 MG/1
5 TABLET ORAL DAILY
Status: DISCONTINUED | OUTPATIENT
Start: 2021-11-04 | End: 2021-11-04

## 2021-11-04 RX ORDER — SUMATRIPTAN 50 MG/1
100 TABLET, FILM COATED ORAL DAILY PRN
Status: DISCONTINUED | OUTPATIENT
Start: 2021-11-04 | End: 2021-11-04

## 2021-11-04 RX ORDER — LEVOTHYROXINE SODIUM ANHYDROUS 100 UG/5ML
200 INJECTION, POWDER, LYOPHILIZED, FOR SOLUTION INTRAVENOUS ONCE
Status: DISCONTINUED | OUTPATIENT
Start: 2021-11-04 | End: 2021-11-04

## 2021-11-04 RX ORDER — ONDANSETRON 2 MG/ML
4 INJECTION INTRAMUSCULAR; INTRAVENOUS EVERY 6 HOURS PRN
Status: DISCONTINUED | OUTPATIENT
Start: 2021-11-04 | End: 2021-11-05 | Stop reason: HOSPADM

## 2021-11-04 RX ORDER — LEVOTHYROXINE AND LIOTHYRONINE 19; 4.5 UG/1; UG/1
90 TABLET ORAL DAILY
Status: DISCONTINUED | OUTPATIENT
Start: 2021-11-05 | End: 2021-11-04

## 2021-11-04 RX ORDER — M-VIT,TX,IRON,MINS/CALC/FOLIC 27MG-0.4MG
1 TABLET ORAL DAILY
COMMUNITY

## 2021-11-04 RX ORDER — M-VIT,TX,IRON,MINS/CALC/FOLIC 27MG-0.4MG
1 TABLET ORAL DAILY
Status: DISCONTINUED | OUTPATIENT
Start: 2021-11-05 | End: 2021-11-05 | Stop reason: HOSPADM

## 2021-11-04 RX ORDER — SODIUM CHLORIDE 0.9 % (FLUSH) 0.9 %
5-40 SYRINGE (ML) INJECTION EVERY 12 HOURS SCHEDULED
Status: DISCONTINUED | OUTPATIENT
Start: 2021-11-04 | End: 2021-11-05 | Stop reason: HOSPADM

## 2021-11-04 RX ORDER — LEVOTHYROXINE SODIUM 0.07 MG/1
150 TABLET ORAL DAILY
Status: DISCONTINUED | OUTPATIENT
Start: 2021-11-05 | End: 2021-11-04

## 2021-11-04 RX ORDER — POTASSIUM CHLORIDE 7.45 MG/ML
10 INJECTION INTRAVENOUS PRN
Status: DISCONTINUED | OUTPATIENT
Start: 2021-11-04 | End: 2021-11-05 | Stop reason: HOSPADM

## 2021-11-04 RX ORDER — POTASSIUM CHLORIDE 20 MEQ/1
40 TABLET, EXTENDED RELEASE ORAL PRN
Status: DISCONTINUED | OUTPATIENT
Start: 2021-11-04 | End: 2021-11-05 | Stop reason: HOSPADM

## 2021-11-04 RX ORDER — PROMETHAZINE HYDROCHLORIDE 25 MG/ML
25 INJECTION, SOLUTION INTRAMUSCULAR; INTRAVENOUS ONCE
Status: COMPLETED | OUTPATIENT
Start: 2021-11-04 | End: 2021-11-04

## 2021-11-04 RX ORDER — PANTOPRAZOLE SODIUM 40 MG/1
40 TABLET, DELAYED RELEASE ORAL
Status: DISCONTINUED | OUTPATIENT
Start: 2021-11-05 | End: 2021-11-05 | Stop reason: HOSPADM

## 2021-11-04 RX ORDER — HYDRALAZINE HYDROCHLORIDE 20 MG/ML
10 INJECTION INTRAMUSCULAR; INTRAVENOUS EVERY 6 HOURS PRN
Status: DISCONTINUED | OUTPATIENT
Start: 2021-11-04 | End: 2021-11-05 | Stop reason: HOSPADM

## 2021-11-04 RX ORDER — FLUOXETINE HYDROCHLORIDE 20 MG/1
20 CAPSULE ORAL DAILY
Status: DISCONTINUED | OUTPATIENT
Start: 2021-11-05 | End: 2021-11-05 | Stop reason: HOSPADM

## 2021-11-04 RX ORDER — LEVOTHYROXINE SODIUM 0.07 MG/1
75 TABLET ORAL DAILY
Status: DISCONTINUED | OUTPATIENT
Start: 2021-11-05 | End: 2021-11-05 | Stop reason: HOSPADM

## 2021-11-04 RX ORDER — ACETAMINOPHEN 650 MG/1
650 SUPPOSITORY RECTAL EVERY 6 HOURS PRN
Status: DISCONTINUED | OUTPATIENT
Start: 2021-11-04 | End: 2021-11-05 | Stop reason: HOSPADM

## 2021-11-04 RX ORDER — SODIUM CHLORIDE 0.9 % (FLUSH) 0.9 %
10 SYRINGE (ML) INJECTION PRN
Status: DISCONTINUED | OUTPATIENT
Start: 2021-11-04 | End: 2021-11-05 | Stop reason: HOSPADM

## 2021-11-04 RX ORDER — 0.9 % SODIUM CHLORIDE 0.9 %
80 INTRAVENOUS SOLUTION INTRAVENOUS ONCE
Status: COMPLETED | OUTPATIENT
Start: 2021-11-04 | End: 2021-11-04

## 2021-11-04 RX ORDER — POTASSIUM CHLORIDE 750 MG/1
10 CAPSULE, EXTENDED RELEASE ORAL 3 TIMES DAILY
Status: DISCONTINUED | OUTPATIENT
Start: 2021-11-04 | End: 2021-11-05 | Stop reason: HOSPADM

## 2021-11-04 RX ORDER — MAGNESIUM SULFATE 1 G/100ML
1000 INJECTION INTRAVENOUS PRN
Status: DISCONTINUED | OUTPATIENT
Start: 2021-11-04 | End: 2021-11-05 | Stop reason: HOSPADM

## 2021-11-04 RX ORDER — ASPIRIN 81 MG/1
324 TABLET, CHEWABLE ORAL ONCE
Status: COMPLETED | OUTPATIENT
Start: 2021-11-04 | End: 2021-11-04

## 2021-11-04 RX ORDER — FLUDROCORTISONE ACETATE 0.1 MG/1
0.1 TABLET ORAL DAILY
Status: DISCONTINUED | OUTPATIENT
Start: 2021-11-05 | End: 2021-11-05

## 2021-11-04 RX ORDER — NITROGLYCERIN 0.4 MG/1
0.4 TABLET SUBLINGUAL EVERY 5 MIN PRN
Status: DISCONTINUED | OUTPATIENT
Start: 2021-11-04 | End: 2021-11-05 | Stop reason: HOSPADM

## 2021-11-04 RX ORDER — SODIUM CHLORIDE 9 MG/ML
25 INJECTION, SOLUTION INTRAVENOUS PRN
Status: DISCONTINUED | OUTPATIENT
Start: 2021-11-04 | End: 2021-11-05 | Stop reason: HOSPADM

## 2021-11-04 RX ORDER — HYDRALAZINE HYDROCHLORIDE 25 MG/1
25 TABLET, FILM COATED ORAL EVERY 8 HOURS SCHEDULED
Status: DISCONTINUED | OUTPATIENT
Start: 2021-11-04 | End: 2021-11-04

## 2021-11-04 RX ORDER — LEVOTHYROXINE AND LIOTHYRONINE 19; 4.5 UG/1; UG/1
90 TABLET ORAL 2 TIMES DAILY
Status: DISCONTINUED | OUTPATIENT
Start: 2021-11-04 | End: 2021-11-05 | Stop reason: HOSPADM

## 2021-11-04 RX ADMIN — IOPAMIDOL 75 ML: 755 INJECTION, SOLUTION INTRAVENOUS at 08:04

## 2021-11-04 RX ADMIN — LEVOTHYROXINE SODIUM 100 MCG: 100 INJECTION, POWDER, LYOPHILIZED, FOR SOLUTION INTRAVENOUS at 16:45

## 2021-11-04 RX ADMIN — PROMETHAZINE HYDROCHLORIDE 25 MG: 25 INJECTION INTRAMUSCULAR; INTRAVENOUS at 20:53

## 2021-11-04 RX ADMIN — ASPIRIN 324 MG: 81 TABLET, CHEWABLE ORAL at 07:06

## 2021-11-04 RX ADMIN — SODIUM CHLORIDE 80 ML: 9 INJECTION, SOLUTION INTRAVENOUS at 08:04

## 2021-11-04 RX ADMIN — HYDROCORTISONE SODIUM SUCCINATE 100 MG: 100 INJECTION, POWDER, FOR SOLUTION INTRAMUSCULAR; INTRAVENOUS at 16:00

## 2021-11-04 RX ADMIN — SODIUM CHLORIDE, PRESERVATIVE FREE 10 ML: 5 INJECTION INTRAVENOUS at 08:04

## 2021-11-04 RX ADMIN — ONDANSETRON 4 MG: 2 INJECTION INTRAMUSCULAR; INTRAVENOUS at 18:03

## 2021-11-04 RX ADMIN — SUMATRIPTAN 6 MG: 6 INJECTION SUBCUTANEOUS at 13:35

## 2021-11-04 RX ADMIN — ACETAMINOPHEN 650 MG: 325 TABLET ORAL at 22:26

## 2021-11-04 RX ADMIN — POTASSIUM CHLORIDE 40 MEQ: 1500 TABLET, EXTENDED RELEASE ORAL at 12:13

## 2021-11-04 ASSESSMENT — PAIN SCALES - GENERAL
PAINLEVEL_OUTOF10: 3
PAINLEVEL_OUTOF10: 0
PAINLEVEL_OUTOF10: 6
PAINLEVEL_OUTOF10: 0
PAINLEVEL_OUTOF10: 6
PAINLEVEL_OUTOF10: 0
PAINLEVEL_OUTOF10: 0
PAINLEVEL_OUTOF10: 3

## 2021-11-04 ASSESSMENT — PAIN DESCRIPTION - PAIN TYPE
TYPE: ACUTE PAIN

## 2021-11-04 ASSESSMENT — PAIN DESCRIPTION - LOCATION
LOCATION: HEAD;CHEST
LOCATION: CHEST
LOCATION: CHEST

## 2021-11-04 ASSESSMENT — PAIN DESCRIPTION - DESCRIPTORS
DESCRIPTORS: HEAVINESS
DESCRIPTORS: HEAVINESS
DESCRIPTORS: HEADACHE

## 2021-11-04 ASSESSMENT — PAIN DESCRIPTION - FREQUENCY
FREQUENCY: CONTINUOUS

## 2021-11-04 ASSESSMENT — PAIN DESCRIPTION - ORIENTATION
ORIENTATION: MID
ORIENTATION: MID

## 2021-11-04 ASSESSMENT — PAIN DESCRIPTION - ONSET
ONSET: ON-GOING

## 2021-11-04 ASSESSMENT — PAIN - FUNCTIONAL ASSESSMENT
PAIN_FUNCTIONAL_ASSESSMENT: ACTIVITIES ARE NOT PREVENTED
PAIN_FUNCTIONAL_ASSESSMENT: ACTIVITIES ARE NOT PREVENTED

## 2021-11-04 ASSESSMENT — PAIN DESCRIPTION - PROGRESSION
CLINICAL_PROGRESSION: NOT CHANGED
CLINICAL_PROGRESSION: NOT CHANGED

## 2021-11-04 NOTE — CARE COORDINATION
Case Management Initial Discharge Plan  Hamilton Bang,           Met with:patient to discuss discharge plans. Information verified: address, contacts, phone number, , insurance Yes  Insurance Provider: Tariq Traditional    Emergency Contact/Next of Kin name & number: Luis Franks [phone], Doreen Pierce [phone]  Who are involved in patient's support system? family    PCP: VIVIEN De Jesus CNP  Date of last visit: 8/10/2021    Discharge Planning    Living Arrangements:          Patient able to perform ADL's:Independent    Current Services (outpatient & in home) none  DME equipment: none  DME provider:     Is patient receiving oral anticoagulation therapy? No    Potential Assistance Needed:       Patient agreeable to home care: No  [place] of choice provided:  no    Prior SNF/Rehab Placement and Facility: no  Agreeable to SNF/Rehab: No  [place] of choice provided: no     Evaluation: no    Expected Discharge date:       Patient expects to be discharged to: If home: is the family and/or caregiver wiling & able to provide support at home? yes  Who will be providing this support? spouse    Follow Up Appointment: Best Day/ Time:      Transportation provider: drives  Transportation arrangements needed for discharge: No    Readmission Risk              Risk of Unplanned Readmission:  18         Does patient have a readmission risk score greater than 14?: Yes  If yes, follow-up appointment must be made within 7 days of discharge.      Goals of Care: manage electrolytes     Educated patient on transitional options, provided freedom of choice and are agreeable with plan    Discharge Plan: home with spouse, independent    Electronically signed by Karn Dakin, RN on 21 at 12:23 PM EDT

## 2021-11-04 NOTE — ED PROVIDER NOTES
[facility]  [address] [facility] [address]  Phone: [phone]  Fax: [phone]        ADDENDUM:      Care of this patient was assumed from Dr. Jacinto Edwards. The patient was seen for Abnormal Lab (pt states K was 3.1 on Monday)  . The patient's initial evaluation and plan have been discussed with the prior provider who initially evaluated the patient. Nursing Notes, Past Medical Hx, Past Surgical Hx, Allergies, were all reviewed. PAST MEDICAL HISTORY    has a past medical history of Arrhythmia, Depression, GERD (gastroesophageal reflux disease), Hyperthyroidism, Migraine, Neurologic cardiac syncope, and Thyroid cancer ([facility] [address] SURGICAL HISTORY      has a past surgical history that includes Gallbladder surgery; Appendectomy; Thyroidectomy (Bilateral);  section; Breast lumpectomy (Left); Gastric bypass surgery; and Demetria-en-Y Gastric Bypass (N/A). CURRENT MEDICATIONS       Previous Medications    AMILORIDE (MIDAMOR) 5 MG TABLET    Take 1 tablet by mouth daily    ARMODAFINIL (NUVIGIL) 250 MG TABS    Take by mouth daily. AZELASTINE (ASTELIN) 0.1 % NASAL SPRAY    2 sprays by Nasal route 2 times daily    BUTALBITAL-ACETAMINOPHEN-CAFFEINE (FIORICET, ESGIC) -40 MG PER TABLET    Take by mouth daily as needed    CETIRIZINE (ZYRTEC) 10 MG TABLET    Take by mouth daily    CYANOCOBALAMIN 1000 MCG/ML INJECTION        FLUDROCORTISONE (FLORINEF) 0.1 MG TABLET    Take 0.1 mg by mouth daily.     FLUOXETINE (PROZAC) 20 MG CAPSULE    TAKE 2 CAPSULES DAILY    LEVOTHYROXINE (SYNTHROID) 75 MCG TABLET    Take 2 tablets by mouth Daily    MAGNESIUM OXIDE 500 MG TABS    Take 500 mg by mouth 2 times daily    MULTIPLE VITAMINS-MINERALS (THERAPEUTIC MULTIVITAMIN-MINERALS) TABLET    Take 1 tablet by mouth daily    NARATRIPTAN (AMERGE) 2.5 MG TABLET    Take by mouth daily as needed    ONDANSETRON (ZOFRAN-ODT) 4 MG DISINTEGRATING TABLET    Take 1 tablet by mouth every 8 hours as needed for Nausea or Vomiting    PANTOPRAZOLE (PROTONIX) 40 MG TABLET    Take 40 mg by mouth daily    POTASSIUM CHLORIDE (MICRO-K) 10 MEQ EXTENDED RELEASE CAPSULE    Take 1 capsule by mouth 3 times daily for 30 doses    PROMETHAZINE (PHENERGAN) 25 MG TABLET    Take 1 po q 6 hours PRN    SUMATRIPTAN SUCCINATE 6 MG/0.5ML SOAJ    Inject as directed as needed    THYROID (ARMOUR) 90 MG TABLET    Take 90 mg by mouth daily    TOPIRAMATE (TOPAMAX) 100 MG TABLET    Take 200 mg by mouth nightly     UBRELVY 50 MG TABS    Take 100 mg by mouth daily as needed for Migraine     VITAMIN D (CHOLECALCIFEROL) 1000 UNITS CAPS CAPSULE    Take 4,000 Units by mouth 2 times daily       ALLERGIES     is allergic to amoxicillin-pot clavulanate, amoxil [amoxicillin], cefdinir, frovatriptan, metoclopramide, morphine, nsaids, shrimp (diagnostic), and shrimp extract allergy skin test.      Diagnostic Results     LABS:   Results for orders placed or performed during the hospital encounter of 11/04/21   COVID-19, Rapid    Specimen: Nasopharyngeal Swab   Result Value Ref Range    Specimen Description . NASOPHARYNGEAL SWAB     SARS-CoV-2, Rapid Not Detected Not Detected   CBC Auto Differential   Result Value Ref Range    WBC 5.3 3.5 - 11.0 k/uL    RBC 4.58 4.0 - 5.2 m/uL    Hemoglobin 13.9 12.0 - 16.0 g/dL    Hematocrit 40.3 36 - 46 %    MCV 88.0 80 - 100 fL    MCH 30.4 26 - 34 pg    MCHC 34.5 31 - 37 g/dL    RDW 13.1 12.5 - 15.4 %    Platelets 300 699 - 318 k/uL    MPV 7.0 6.0 - 12.0 fL    NRBC Automated NOT REPORTED per 100 WBC    Differential Type NOT REPORTED     Seg Neutrophils 66 36 - 66 %    Lymphocytes 24 24 - 44 %    Monocytes 6 2 - 11 %    Eosinophils % 3 1 - 4 %    Basophils 1 0 - 2 %    Immature Granulocytes NOT REPORTED 0 %    Segs Absolute 3.50 1.8 - 7.7 k/uL    Absolute Lymph # 1.30 1.0 - 4.8 k/uL    Absolute Mono # 0.30 0.1 - 1.2 k/uL    Absolute Eos # 0.10 0.0 - 0.4 k/uL    Basophils Absolute 0.10 0.0 - 0.2 k/uL    Absolute Immature Granulocyte NOT REPORTED 0.00 - 0.30 k/uL    WBC Morphology NOT REPORTED     RBC Morphology NOT REPORTED     Platelet Estimate NOT REPORTED    Basic Metabolic Panel w/ Reflex to MG   Result Value Ref Range    Glucose 90 70 - 99 mg/dL    BUN 5 (L) 6 - 20 mg/dL    CREATININE 0.80 0.50 - 0.90 mg/dL    Bun/Cre Ratio NOT REPORTED 9 - 20    Calcium 8.7 8.6 - 10.4 mg/dL    Sodium 138 135 - 144 mmol/L    Potassium 3.1 (L) 3.7 - 5.3 mmol/L    Chloride 98 98 - 107 mmol/L    CO2 31 20 - 31 mmol/L    Anion Gap 9 9 - 17 mmol/L    GFR Non-African American >60 >60 mL/min    GFR African American >60 >60 mL/min    GFR Comment          GFR Staging NOT REPORTED    Troponin   Result Value Ref Range    Troponin, High Sensitivity 6 0 - 14 ng/L    Troponin T NOT REPORTED <0.03 ng/mL    Troponin Interp NOT REPORTED    D-Dimer, Quantitative   Result Value Ref Range    D-Dimer, Quant 0.57 mg/L FEU   Magnesium   Result Value Ref Range    Magnesium 2.0 1.6 - 2.6 mg/dL       RADIOLOGY:  CT CHEST PULMONARY EMBOLISM W CONTRAST   Final Result   Negative CTA chest with no evidence of pulmonary embolism or acute pulmonary   abnormality. CT CHEST PULMONARY EMBOLISM W CONTRAST (Final result)  Result time 11/04/21 08:49:36  Final result by Jud Garner MD (11/04/21 08:49:36)                Impression:    Negative CTA chest with no evidence of pulmonary embolism or acute pulmonary   abnormality. Narrative:    EXAMINATION:   CTA OF THE CHEST 11/4/2021 7:51 am     TECHNIQUE:   CTA of the chest was performed after the administration of intravenous   contrast.  Multiplanar reformatted images are provided for review.  MIP   images are provided for review. Dose modulation, iterative reconstruction,   and/or weight based adjustment of the mA/kV was utilized to reduce the   radiation dose to as low as reasonably achievable. COMPARISON:   None.      HISTORY:   ORDERING SYSTEM PROVIDED HISTORY: elevated d dimer   Reason for Exam: elevated d-dimer, chest pain   Acuity: Acute   Type of Exam: Initial     FINDINGS:   Pulmonary Arteries: Pulmonary arteries are adequately opacified for   evaluation.  No evidence of intraluminal filling defect to suggest pulmonary   embolism.  Main pulmonary artery is normal in caliber. Mediastinum: No evidence of mediastinal lymphadenopathy.  Normal heart size. Normal thoracic aorta. Lungs/pleura: Bilateral lower lobe dependent atelectasis.  No focal   consolidation or pulmonary edema.  No evidence of pleural effusion or   pneumothorax. Upper Abdomen: Partially visualized postsurgical changes of gastric bypass. Soft Tissues/Bones: No significant osseous or soft tissue abnormality.                     RECENT VITALS:  BP: 136/80, Temp: 98.1 °F (36.7 °C), Pulse: (!) 48, Resp: 15     ED Course     The patient was given the following medications:  Orders Placed This Encounter   Medications    aspirin chewable tablet 324 mg    0.9 % sodium chloride bolus    iopamidol (ISOVUE-370) 76 % injection 75 mL    sodium chloride flush 0.9 % injection 10 mL       Medical Decision Making      The patient presents with intermittent chest pain for a couple of days. She also has history of hypokalemia. The patient describes a pressure type sensation. Dr. Doloris Gottron obtained an EKG and blood work. He asked that I follow-up on these tests. EMERGENCY DEPARTMENT COURSE:   Vitals:    Vitals:    11/04/21 0645 11/04/21 0647 11/04/21 0749 11/04/21 0859   BP: 138/84 138/84 (!) 148/86 136/80   Pulse:  51 (!) 47 (!) 48   Resp:  18 15 15   Temp:  98.1 °F (36.7 °C)     TempSrc:  Oral     SpO2: 98% 100% 99% 98%   Weight:  122.5 kg (270 lb)     Height:  5' 5\" (1.651 m)       -------------------------  BP: 136/80, Temp: 98.1 °F (36.7 °C), Pulse: (!) 48, Resp: 15      RE-EVALUATION:    The patient has been persistently bradycardic and hypertensive. She continues to have some mild left-sided chest pressure.   I think she needs a medication adjustment. She is negative for PE. I have discussed the case with the cardiologist covering here in [facility] and the hospitalist team is willing to admit here. The patient will be admitted in stable condition. CONSULTS:    6602  Cardiologist paged. [phone]  Discussed with Melvin aviles from cardiology. [phone]  Discussed with Chaim Valero from [facility]. Admit to Dr. Dozier in [facility]. PROCEDURES:  None    FINAL IMPRESSION      1. Chest pain, unspecified type    2. Bradycardia    3. Essential hypertension    4. Hypokalemia          DISPOSITION/PLAN   DISPOSITION        CONDITION ON DISPOSITION:     stable    PATIENT REFERRED TO:  No follow-up provider specified.     DISCHARGE MEDICATIONS:  New Prescriptions    No medications on file       (Please note that portions of this note were completed with a voicerecognition program.  Efforts were made to edit the dictations but occasionally words are mis-transcribed.)    Namita Castro MD  Attending Emergency Medicine Physician                     Fran Cox MD  11/04/21 0431

## 2021-11-04 NOTE — ED NOTES
Report called to Aiyana Carranza on inpatient floor [place].       Ham Lockwood RN  11/04/21 1016

## 2021-11-04 NOTE — H&P
[facility]  Office: [address], DO, Tramaine Chiu, DO, Garrett Kline, DO, Racquel Dozier, DO, Alejandra Washington MD, Dom Stephens MD, Elena Garcia MD, oMuna Henson MD, Maria Ines Mcghee MD, Nidia Quan MD, Ines Larsen MD, Librado Poe MD, Re Curry DO, Frank Simmonds, DO, Guzman Treviño MD,  Paulette Sexton DO, Ara Stock MD, Prem Blackburn MD, Kellie Rushing MD, Pablo Espinoza MD, Marciano Bernheim, MD, Dedrick Love MD, Billie Duke, [facility], [facility], [facility], Caprice Maxwell, CNP, Chan Thompson, CNS, Zach Garcia, CNP, Roro Cespedes, CNP, Dnauta Barnhart, CNP, Audra Gariaby, CNP, Shelley Connor, CNP, Alex Mcbride, CNP, Triston Joyner PA-C, Nguyen Lucero, [facility], Barry Carter, CNP, Laurent Farah, CNP, Woodward Brittle, CNP, Lamar Irivng, CNP, Aristeo Chaudhary, CNP, Betzaida Wu, CNP, Ravi [place], [address]    HISTORY AND PHYSICAL EXAMINATION            Date:   11/4/2021  Patient name:  Yandy Perez  Date of admission:  11/4/2021  6:43 AM  MRN:   [id number]  Account:  [de-identified]  YOB: 1985  PCP:    VIVIEN Sun CNP  Room:   [facility]  Code Status:    Full Code    Chief Complaint:     Chief Complaint   Patient presents with    Abnormal Lab     pt states K was 3.1 on Monday       History Obtained From:     patient, electronic medical record    History of Present Illness:     Yandy Perez is a 43 y.o. Non- / non  female who presents with Abnormal Lab (pt states K was 3.1 on Monday)   and is admitted to the hospital for the management of Chest pain.     This is a 57-year-old female with a past medical history significant for thyroid cancer status post thyroidectomy and iodine ablation in 2017 (follows with Dr. Linda Kelly), chronic hypokalemia, cardiogenic syncope, migraine disorder, history of Demetria-en-Y, who presents to the emergency department with a 1 day history of exertional chest pain/pressure. Per patient she started encountering the symptoms approximately 1 day ago with walking. States it felt like somebody was sitting on her chest.  Patient also endorses associated shortness of breath, fatigue and headache which was abnormal from her typical migraine presentation. She presented to the emergency department for further evaluation and work-up. In the emergency department, diagnostics included EKG which demonstrated sinus bradycardia at a rate of 48 bpm.  Troponin was negative. Other metabolic and hematologic abnormalities on presentation included hypokalemia with potassium of 3.1, slightly elevated D-dimer at 0.57. CTa negative for PE. Significant to note patient's TSH came back significantly elevated at 59.71. She was just at her endocrinologist's in 2021 where her TSH was 0.71, follow-up T4 was 0.58 at that time. Patient states she has not missed any doses of her [place] or levothyroxine. She states approximately 2 years ago she was started on liquid thyroid supplementation due to poor p.o. absorption with her history of Demetria-en-Y. Patient states at that time in 2019 her TSH was 55.29. Patient states this is the highest her TSH has ever been.     Past Medical History:     Past Medical History:   Diagnosis Date    Arrhythmia 2011    Depression 2011    GERD (gastroesophageal reflux disease)     Gastoric presis     Hyperthyroidism     Migraine 2011    Neurologic cardiac syncope 2011    Thyroid cancer [facility])         Past Surgical History:     Past Surgical History:   Procedure Laterality Date    APPENDECTOMY      BREAST LUMPECTOMY Left     5 lumps removed out of left breast      SECTION      2 C- Sections     GALLBLADDER SURGERY      GASTRIC BYPASS SURGERY      DEMETRIA-EN-Y GASTRIC BYPASS N/A     Patient had to get gastric bypass reversed due to Ulcers removed     THYROIDECTOMY Bilateral     whole thyroid removed Medications Prior to Admission:     Prior to Admission medications    Medication Sig Start Date End Date Taking? Authorizing Provider   Multiple Vitamins-Minerals (THERAPEUTIC MULTIVITAMIN-MINERALS) tablet Take 1 tablet by mouth daily   Yes Historical Provider, MD   ondansetron (ZOFRAN-ODT) 4 MG disintegrating tablet Take 1 tablet by mouth every 8 hours as needed for Nausea or Vomiting 11/3/21  Yes VIVIEN Gallo CNP   FLUoxetine (PROZAC) 20 MG capsule TAKE 2 CAPSULES DAILY  Patient taking differently: 20 mg nightly  7/1/21  Yes VIVIEN Gallo CNP   aMILoride (MIDAMOR) 5 MG tablet Take 1 tablet by mouth daily 5/12/21 5/12/22 Yes VIVIEN Gallo CNP   azelastine (ASTELIN) 0.1 % nasal spray 2 sprays by Nasal route 2 times daily 12/14/20  Yes Historical Provider, MD   butalbital-acetaminophen-caffeine (FIORICET, ESGIC) -40 MG per tablet Take by mouth daily as needed 1/4/21  Yes Historical Provider, MD   naratriptan (AMERGE) 2.5 MG tablet Take by mouth daily as needed 12/15/20  Yes Historical Provider, MD   UBRELVY 50 MG TABS Take 100 mg by mouth daily as needed for Migraine  12/15/20  Yes Historical Provider, MD   thyroid (ARMOUR) 90 MG tablet Take 90 mg by mouth daily   Yes Tiffani Urrutia MD   levothyroxine (SYNTHROID) 75 MCG tablet Take 2 tablets by mouth Daily 11/24/20  Yes VIVIEN Gallo CNP   potassium chloride (MICRO-K) 10 MEQ extended release capsule Take 1 capsule by mouth 3 times daily for 30 doses 10/13/20 1/26/22 Yes VIVIEN Gallo CNP   cetirizine (ZYRTEC) 10 MG tablet Take by mouth nightly  6/25/20  Yes Historical Provider, MD   SUMAtriptan Succinate 6 MG/0.5ML SOAJ Inject as directed as needed 6/18/20  Yes Historical Provider, MD   Armodafinil (NUVIGIL) 250 MG TABS Take by mouth daily.    Yes Historical Provider, MD   pantoprazole (PROTONIX) 40 MG tablet Take 40 mg by mouth nightly    Yes Historical Provider, MD   Vitamin D (CHOLECALCIFEROL) 1000 UNITS CAPS capsule Take 4,000 Units by mouth 2 times daily   Yes Historical Provider, MD   Magnesium Oxide 500 MG TABS Take 500 mg by mouth 2 times daily   Yes Historical Provider, MD   promethazine (PHENERGAN) 25 MG tablet Take 1 po q 6 hours PRN 6/16/15   Jonny Arriaga MD        Allergies:     Amoxicillin-pot clavulanate, Amoxil [amoxicillin], Cefdinir, Frovatriptan, Metoclopramide, Morphine, Nsaids, Shrimp (diagnostic), and Shrimp extract allergy skin test    Social History:     Tobacco:    reports that she has never smoked. She has never used smokeless tobacco.  Alcohol:      reports no history of alcohol use. Drug Use:  reports no history of drug use. Family History:     Family History   Problem Relation Age of Onset    Thyroid Disease Paternal Grandfather    Bertha Rumps Migraines Mother     Other Mother 43        uterine fibroids, DEXTER/BSO     High Blood Pressure Father     Heart Disease Father     Migraines Sister     Breast Cancer Maternal Cousin         1st cousin once removed     Thyroid Cancer Maternal Cousin     No Known Problems Brother     No Known Problems Sister        Review of Systems:     Positive and Negative as described in HPI.     CONSTITUTIONAL:  negative for fevers, +chills, sweats, +fatigue, weight loss  HEENT:  negative for vision, hearing changes, runny nose, throat pain  RESPIRATORY:  +shortness of breath, cough, congestion, wheezing  CARDIOVASCULAR:  +chest pain/pressure, palpitations  GASTROINTESTINAL:  negative for nausea, vomiting, diarrhea, constipation, change in bowel habits, abdominal pain   GENITOURINARY:  negative for difficulty of urination, burning with urination, frequency   INTEGUMENT:  negative for rash, skin lesions, easy bruising   HEMATOLOGIC/LYMPHATIC:  negative for swelling/edema   ALLERGIC/IMMUNOLOGIC:  negative for urticaria , itching  ENDOCRINE:  negative increase in drinking, increase in urination, hot or cold intolerance  MUSCULOSKELETAL: 46 %    MCV 88.0 80 - 100 fL    MCH 30.4 26 - 34 pg    MCHC 34.5 31 - 37 g/dL    RDW 13.1 12.5 - 15.4 %    Platelets 717 264 - 970 k/uL    MPV 7.0 6.0 - 12.0 fL    NRBC Automated NOT REPORTED per 100 WBC    Differential Type NOT REPORTED     Seg Neutrophils 66 36 - 66 %    Lymphocytes 24 24 - 44 %    Monocytes 6 2 - 11 %    Eosinophils % 3 1 - 4 %    Basophils 1 0 - 2 %    Immature Granulocytes NOT REPORTED 0 %    Segs Absolute 3.50 1.8 - 7.7 k/uL    Absolute Lymph # 1.30 1.0 - 4.8 k/uL    Absolute Mono # 0.30 0.1 - 1.2 k/uL    Absolute Eos # 0.10 0.0 - 0.4 k/uL    Basophils Absolute 0.10 0.0 - 0.2 k/uL    Absolute Immature Granulocyte NOT REPORTED 0.00 - 0.30 k/uL    WBC Morphology NOT REPORTED     RBC Morphology NOT REPORTED     Platelet Estimate NOT REPORTED    Basic Metabolic Panel w/ Reflex to MG    Collection Time: 11/04/21  6:55 AM   Result Value Ref Range    Glucose 90 70 - 99 mg/dL    BUN 5 (L) 6 - 20 mg/dL    CREATININE 0.80 0.50 - 0.90 mg/dL    Bun/Cre Ratio NOT REPORTED 9 - 20    Calcium 8.7 8.6 - 10.4 mg/dL    Sodium 138 135 - 144 mmol/L    Potassium 3.1 (L) 3.7 - 5.3 mmol/L    Chloride 98 98 - 107 mmol/L    CO2 31 20 - 31 mmol/L    Anion Gap 9 9 - 17 mmol/L    GFR Non-African American >60 >60 mL/min    GFR African American >60 >60 mL/min    GFR Comment          GFR Staging NOT REPORTED    Troponin    Collection Time: 11/04/21  6:55 AM   Result Value Ref Range    Troponin, High Sensitivity 6 0 - 14 ng/L    Troponin T NOT REPORTED <0.03 ng/mL    Troponin Interp NOT REPORTED    D-Dimer, Quantitative    Collection Time: 11/04/21  6:55 AM   Result Value Ref Range    D-Dimer, Quant 0.57 mg/L FEU   Magnesium    Collection Time: 11/04/21  6:55 AM   Result Value Ref Range    Magnesium 2.0 1.6 - 2.6 mg/dL   TSH without Reflex    Collection Time: 11/04/21  6:55 AM   Result Value Ref Range    TSH 59.71 (H) 0.30 - 5.00 mIU/L   COVID-19, Rapid    Collection Time: 11/04/21  9:17 AM    Specimen: Nasopharyngeal Swab Result Value Ref Range    Specimen Description . NASOPHARYNGEAL SWAB     SARS-CoV-2, Rapid Not Detected Not Detected       Imaging/Diagnostics:  CT CHEST PULMONARY EMBOLISM W CONTRAST    Result Date: 11/4/2021  Negative CTA chest with no evidence of pulmonary embolism or acute pulmonary abnormality. Assessment :      Hospital Problems         Last Modified POA    * (Principal) Chest pain 11/4/2021 Yes    Neurogenic syncope 11/4/2021 Yes    Migraine 11/4/2021 Yes    Hypokalemia 11/4/2021 Yes    Bradycardia 11/4/2021 Yes    Postoperative hypothyroidism 11/4/2021 Yes    Adenomatous goiter, toxic or with hyperthyroidism 11/4/2021 Yes    Benign essential hypertension 11/4/2021 Yes    Fatigue 11/4/2021 Yes    History of Demetria-en-Y gastric bypass 11/4/2021 Yes    Iron deficiency anemia 11/4/2021 Yes    Malignant tumor of thyroid gland ([address] 11/4/2021 Yes          Plan:     Patient status inpatient in the Progressive Unit/Step down    1. Myxedema:   - With history of postoperative hypothyroidism, status post thyroidectomy from thyroid CA.    - Patient follows with endocrinology as outpatient. - Most recent outpatient visit in aug 2021 was normal.    - Patient has been taking [place] 90 mg daily as well as her levothyroxine 75 mcg daily without interruption.   -  TSH was normal in August 2021 (0.71). - TSH significantly elevated at 59.71 admission, with new onset headache, cold intolerance, and bradycardia. - Likely complicated by history of Demetria-en-Y malabsorption.    - Start IV replacement 200 mcg today, followed by 50 mcg daily with serial TSH/T4 levels    2. Bradycardia:   - With negative troponins.    - Heart rate at time of assessment was 46 bpm's. - Cardiology consulted, appreciate recommendations. - Follow-up echo for completeness   - likely as a result of profound hypothyroid/myxedema  - Continuous telemetry monitoring    3.  History of neurogenic syncope:   - On Florinef, hold with HTN  - Now with rebound hypertension.    - Will add as needed medications in the interim. - Likely sequelae of hypothyroid and bradycardia. 4. History of Demetria-en-Y gastric bypass:   - With malabsorption likely complicating absorption of thyroid medications. 5. Chronic hypokalemia:  -On spironolactone equivalent in the outpatient setting    6. Complicated migraines:   - Follows with neurology in the outpatient setting.    - Continue home regimen  - Previous MRI reviewed from May 2021    7. GI/DVT prophylaxis: Protonix, Lovenox    Consultations:   IP CONSULT TO CARDIOLOGY    Patient is admitted as inpatient status because of co-morbidities listed above, severity of signs and symptoms as outlined, requirement for current medical therapies and most importantly because of direct risk to patient if care not provided in a hospital setting. Expected length of stay > 48 hours.     Charmel Osgood, APRN - NP  11/4/2021  11:59 AM    Copy sent to Dr. Ely Bernheim, APRN - CNP

## 2021-11-04 NOTE — LETTER
[facility]  [address] [facility] [address]  Phone: [phone]             November 5, 2021    Patient: Jacqueline Gomes   YOB: 1985   Date of Visit: 11/4/2021       To Whom It May Concern:    Jose Manuel Dunbar was seen and treated in our facility  beginning 11/4/2021 until . She may return to work on 11/6/2021.       Sincerely,       VIVIEN Lopez NP         Signature:__________________________________

## 2021-11-04 NOTE — PROGRESS NOTES
Lois Cordoba NP notified that pt was experiecing chest pain similar to what had brought her into the ER. Vitals taken and WNL, EKG performed WNL, labs ordered to recheck potassium and monitor troponin.

## 2021-11-04 NOTE — CONSULTS
[facility]    Consult                        Today's Date: 11/4/2021  Patient Name: Magno Diggs  Date of admission: 11/4/2021  6:43 AM  Patient's age: 43 y.o., 1985  Admission Dx: Bradycardia [R00.1]    Reason for Consult:  Cardiac evaluation    Requesting Physician: No admitting provider for patient encounter. CHIEF COMPLAINT:  Bradycardia     History Obtained From:  patient, electronic medical record    HISTORY OF PRESENT ILLNESS:          Magno Diggs is a 43 y.o. female who presents to the emergency department for evaluation of intermittent chest pain in the form of heaviness in the substernal area nonradiating since yesterday afternoon. She had associated shortness of breath yesterday especially while walking as well as had nausea. She denies any vomiting, lightheadedness, dizziness, palpitations or diaphoresis and denies any swelling in the legs or calf pain. Chest pain was lasting up to half an hour yesterday and has been ongoing since 5 AM this morning. She grades chest heaviness as 3 out of 10 in intensity. She has history of neurocardiogenic syncope diagnosed in 2010 and underwent stress test at that time. She denies any history of diabetes mellitus, hypertension or hypercholesterolemia. No known history of coronary artery disease. She is non-smoker. She denies any recent long travels or prolonged immobilization and no history of DVT or PE in the past.  No history of cough, fever or chills. Pt seen and examined in the ER. She states that she has a \"heaviness\" in the center of her chest.  She also reports sob with exertion yesterday. She denies any palpitations or swelling. She states that she has been told she had a slow HR in the past, but never below 46. Pt has hx of neurocardiogenic syncope but has had no symptoms in the last 6 years and off all medications for it.       Past Medical History:   has a past medical history of Arrhythmia, Depression, GERD (gastroesophageal reflux disease), Hyperthyroidism, Migraine, Neurologic cardiac syncope, and Thyroid cancer ([facility] [address] Past Surgical History:   has a past surgical history that includes Gallbladder surgery; Appendectomy; Thyroidectomy (Bilateral);  section; Breast lumpectomy (Left); Gastric bypass surgery; and Demetria-en-Y Gastric Bypass (N/A). Home Medications:    Prior to Admission medications    Medication Sig Start Date End Date Taking?  Authorizing Provider   Multiple Vitamins-Minerals (THERAPEUTIC MULTIVITAMIN-MINERALS) tablet Take 1 tablet by mouth daily   Yes Historical Provider, MD   ondansetron (ZOFRAN-ODT) 4 MG disintegrating tablet Take 1 tablet by mouth every 8 hours as needed for Nausea or Vomiting 11/3/21  Yes VIVIEN Solares CNP   FLUoxetine (PROZAC) 20 MG capsule TAKE 2 CAPSULES DAILY  Patient taking differently: 20 mg daily  21  Yes VIVIEN Solares CNP   aMILoride (MIDAMOR) 5 MG tablet Take 1 tablet by mouth daily 21 Yes VIVIEN Solares CNP   azelastine (ASTELIN) 0.1 % nasal spray 2 sprays by Nasal route 2 times daily 20  Yes Historical Provider, MD   butalbital-acetaminophen-caffeine (FIORICET, ESGIC) -40 MG per tablet Take by mouth daily as needed 21  Yes Historical Provider, MD   naratriptan (AMERGE) 2.5 MG tablet Take by mouth daily as needed 12/15/20  Yes Historical Provider, MD   UBRELVY 50 MG TABS Take 100 mg by mouth daily as needed for Migraine  12/15/20  Yes Historical Provider, MD   thyroid (ARMOUR) 90 MG tablet Take 90 mg by mouth daily   Yes Tawnya Peres MD   levothyroxine (SYNTHROID) 75 MCG tablet Take 2 tablets by mouth Daily 20  Yes VIVIEN Solares CNP   potassium chloride (MICRO-K) 10 MEQ extended release capsule Take 1 capsule by mouth 3 times daily for 30 doses 10/13/20 1/26/22 Yes VIVIEN Solares CNP   cetirizine (ZYRTEC) 10 MG tablet Take by mouth daily 20  Yes Historical Provider, MD   SUMAtriptan Succinate 6 MG/0.5ML SOAJ Inject as directed as needed 6/18/20  Yes Historical Provider, MD   Armodafinil (NUVIGIL) 250 MG TABS Take by mouth daily. Yes Historical Provider, MD   pantoprazole (PROTONIX) 40 MG tablet Take 40 mg by mouth daily   Yes Historical Provider, MD   Vitamin D (CHOLECALCIFEROL) 1000 UNITS CAPS capsule Take 4,000 Units by mouth 2 times daily   Yes Historical Provider, MD   Magnesium Oxide 500 MG TABS Take 500 mg by mouth 2 times daily   Yes Historical Provider, MD   fludrocortisone (FLORINEF) 0.1 MG tablet Take 0.1 mg by mouth daily. Yes Historical Provider, MD   cyanocobalamin 1000 MCG/ML injection  12/14/20   Historical Provider, MD   topiramate (TOPAMAX) 100 MG tablet Take 200 mg by mouth nightly     Historical Provider, MD   promethazine (PHENERGAN) 25 MG tablet Take 1 po q 6 hours PRN 6/16/15   Joann Montoya MD       Allergies:  Amoxicillin-pot clavulanate, Amoxil [amoxicillin], Cefdinir, Frovatriptan, Metoclopramide, Morphine, Nsaids, Shrimp (diagnostic), and Shrimp extract allergy skin test    Social History:   reports that she has never smoked. She has never used smokeless tobacco. She reports that she does not drink alcohol and does not use drugs. Family History: family history includes Breast Cancer in her maternal cousin; Heart Disease in her father; High Blood Pressure in her father; Migraines in her mother and sister; No Known Problems in her brother and sister; Other (age of onset: 43) in her mother; Thyroid Cancer in her maternal cousin; Thyroid Disease in her paternal grandfather. No h/o sudden cardiac death. No for premature CAD    REVIEW OF SYSTEMS:    Constitutional: there has been no unanticipated weight loss. There's been No change in energy level, No change in activity level. Eyes: No visual changes or diplopia. No scleral icterus. ENT: No Headaches, hearing loss or vertigo. No mouth sores or sore throat.   Cardiovascular: hours.  APTT:No results for input(s): APTT in the last 72 hours. CARDIAC ENZYMES:No results for input(s): CKTOTAL, CKMB, CKMBINDEX, TROPONINI in the last 72 hours. FASTING LIPID PANEL:  Lab Results   Component Value Date    HDL 54 01/14/2021    TRIG 71 01/14/2021     LIVER PROFILE:No results for input(s): AST, ALT, LABALBU in the last 72 hours. Patient Active Problem List   Diagnosis    Depression    Arrhythmia    Neurologic cardiac syncope    Migraine    Encounter to establish care    Vitamin D deficiency    Elevated fasting glucose    Hypokalemia    Anxiety    Bradycardia       IMPRESSION:  RECOMMENDATIONS:  Bradycardia. Will check thyroid labs. Replace electrolytes. Keep K> 4. Will start on low dose hydralazine for HTN and bradycardia. Atypical CP. Negative trops, continue to check x 3, No acute EKG changes. Will check ECHO to rule out WMA. Will follow. Pt has seen [facility] in the past and will follow up with them on discharge. Discussed with patient and Nurse. Christine Murrieta, [address]  Cardiology Consult    Attending Cardiologist Addendum: I have reviewed the history, physical, subjective, objective, assessment, and plan with the CNP and agree with the note. I have made changes to the note above as needed. Thank you for allowing me to participate in the care of this patient, please do not hesitate to call if you have any questions. Jonel Mcintyre, [id number] [facility]  [facility] [place]  [phone]

## 2021-11-04 NOTE — PROGRESS NOTES
RN called lab inquiring about [phone] lab work not resulted. Tina Aguilar in lab states it was marked as discharged. RN states patient is still here and needs labs drawn.

## 2021-11-04 NOTE — ED PROVIDER NOTES
[address]      Pt Name: Ana Valero  MRN: [id number]  [place] 1985  Date of evaluation: 11/4/21      CHIEF COMPLAINT       Chief Complaint   Patient presents with    Abnormal Lab     pt states K was 3.1 on Monday         HISTORY OF PRESENT ILLNESS    Ana Valero is a 43 y.o. female who presents to the emergency department for evaluation of intermittent chest pain in the form of heaviness in the substernal area nonradiating since yesterday afternoon. She had associated shortness of breath yesterday especially while walking as well as had nausea. She denies any vomiting, lightheadedness, dizziness, palpitations or diaphoresis and denies any swelling in the legs or calf pain. Chest pain was lasting up to half an hour yesterday and has been ongoing since 5 AM this morning. She grades chest heaviness as 3 out of 10 in intensity. She has history of neurocardiogenic syncope diagnosed in 2010 and underwent stress test at that time. She denies any history of diabetes mellitus, hypertension or hypercholesterolemia. No known history of coronary artery disease. She is non-smoker. She denies any recent long travels or prolonged immobilization and no history of DVT or PE in the past.  No history of cough, fever or chills. REVIEW OF SYSTEMS       Review of Systems    All systems reviewed and negative unless noted in HPI. The patient denies fever or constitutional symptoms. Denies vision change. Denies any sore throat or rhinorrhea. Denies any neck pain or stiffness. No vomiting or diarrhea. Denies any dysuria. Denies urinary frequency or hematuria. Denies musculoskeletal injury or pain. Denies any weakness, numbness or focal neurologic deficit. Denies any skin rash or edema. No recent psychiatric issues. No easy bruising or bleeding. Denies any polyuria, polydypsia or history of immunocompromise.       PAST MEDICAL HISTORY has a past medical history of Arrhythmia, Depression, GERD (gastroesophageal reflux disease), Hyperthyroidism, Migraine, and Neurologic cardiac syncope. SURGICAL HISTORY      has a past surgical history that includes Gallbladder surgery; Appendectomy; Thyroidectomy (Bilateral);  section; Breast lumpectomy (Left); Gastric bypass surgery; and Demetria-en-Y Gastric Bypass (N/A). CURRENT MEDICATIONS       Previous Medications    AMILORIDE (MIDAMOR) 5 MG TABLET    Take 1 tablet by mouth daily    ARMODAFINIL (NUVIGIL) 250 MG TABS    Take by mouth daily. AZELASTINE (ASTELIN) 0.1 % NASAL SPRAY    2 sprays by Nasal route 2 times daily    BUTALBITAL-ACETAMINOPHEN-CAFFEINE (FIORICET, ESGIC) -40 MG PER TABLET    Take by mouth daily as needed    CETIRIZINE (ZYRTEC) 10 MG TABLET    Take by mouth daily    CYANOCOBALAMIN 1000 MCG/ML INJECTION        FLUDROCORTISONE (FLORINEF) 0.1 MG TABLET    Take 0.1 mg by mouth daily.     FLUOXETINE (PROZAC) 20 MG CAPSULE    TAKE 2 CAPSULES DAILY    LEVOTHYROXINE (SYNTHROID) 75 MCG TABLET    Take 2 tablets by mouth Daily    MAGNESIUM OXIDE 500 MG TABS    Take 500 mg by mouth 2 times daily    NARATRIPTAN (AMERGE) 2.5 MG TABLET    Take by mouth daily as needed    ONDANSETRON (ZOFRAN-ODT) 4 MG DISINTEGRATING TABLET    Take 1 tablet by mouth every 8 hours as needed for Nausea or Vomiting    PANTOPRAZOLE (PROTONIX) 40 MG TABLET    Take 40 mg by mouth daily    POTASSIUM CHLORIDE (MICRO-K) 10 MEQ EXTENDED RELEASE CAPSULE    Take 1 capsule by mouth 3 times daily for 30 doses    PROMETHAZINE (PHENERGAN) 25 MG TABLET    Take 1 po q 6 hours PRN    SUMATRIPTAN SUCCINATE 6 MG/0.5ML SOAJ    Inject as directed as needed    THYROID (ARMOUR) 90 MG TABLET    Take 90 mg by mouth daily    TOPIRAMATE (TOPAMAX) 100 MG TABLET    Take 200 mg by mouth nightly     UBRELVY 50 MG TABS    Take 100 mg by mouth daily as needed for Migraine     VITAMIN D (CHOLECALCIFEROL) 1000 UNITS CAPS CAPSULE    Take 4,000 Units by mouth 2 times daily       ALLERGIES     is allergic to amoxicillin-pot clavulanate, amoxil [amoxicillin], cefdinir, frovatriptan, metoclopramide, morphine, nsaids, shrimp (diagnostic), and shrimp extract allergy skin test.    FAMILY HISTORY     She indicated that her mother is alive. She indicated that her father is alive. She indicated that both of her sisters are alive. She indicated that her brother is alive. She indicated that the status of her paternal grandfather is unknown. She indicated that the status of her maternal cousin is unknown.     family history includes Breast Cancer in her maternal cousin; Heart Disease in her father; High Blood Pressure in her father; Migraines in her mother and sister; No Known Problems in her brother and sister; Other (age of onset: 43) in her mother; Thyroid Cancer in her maternal cousin; Thyroid Disease in her paternal grandfather. SOCIAL HISTORY      reports that she has never smoked. She has never used smokeless tobacco. She reports that she does not drink alcohol and does not use drugs. PHYSICAL EXAM     INITIAL VITALS:  height is 5' 5\" (1.651 m) and weight is 122.5 kg (270 lb). Her oral temperature is 98.1 °F (36.7 °C). Her blood pressure is 138/84 and her pulse is 51. Her respiration is 18 and oxygen saturation is 100%. Physical Exam  Vitals and nursing note reviewed. Constitutional:       Appearance: She is well-developed. HENT:      Head: Normocephalic and atraumatic. Nose: Nose normal.   Eyes:      Extraocular Movements: Extraocular movements intact. Pupils: Pupils are equal, round, and reactive to light. Cardiovascular:      Rate and Rhythm: Normal rate and regular rhythm. Heart sounds: Normal heart sounds. No murmur heard. Pulmonary:      Effort: Pulmonary effort is normal. No respiratory distress. Breath sounds: Normal breath sounds. Abdominal:      General: Bowel sounds are normal. There is no distension. Palpations: Abdomen is soft. Tenderness: There is no abdominal tenderness. Musculoskeletal:      Cervical back: Normal range of motion and neck supple. Skin:     General: Skin is warm and dry. Neurological:      General: No focal deficit present. Mental Status: She is alert and oriented to person, place, and time. DIFFERENTIAL DIAGNOSIS/ MDM:     Bronchitis, Pneumonia, ACS, PE, Musculoskeletal pain, pneumothorax, thoracic aortic dissection, nonspecific chest pain, gastrointestinal in origin    DIAGNOSTIC RESULTS     EKG: All EKG's are interpreted by the Emergency Department Physician who either signs or Co-signs this chart in the absence of a cardiologist.    EKG Interpretation    Interpreted by emergency department physician    Rhythm: normal sinus   Rate: normal  Axis: normal  Conduction: normal  ST Segments: no acute change  T Waves: nonspecific changes  Q Waves: no acute change    Clinical Impression: no acute change, but this is a nonspecific EKG. RADIOLOGY:   Interpretation per the Radiologist below, if available at the time of this note:    pending      LABS:  No results found for this visit on 11/04/21. EMERGENCY DEPARTMENT COURSE:   Vitals:    Vitals:    11/04/21 0647   BP: 138/84   Pulse: 51   Resp: 18   Temp: 98.1 °F (36.7 °C)   TempSrc: Oral   SpO2: 100%   Weight: 122.5 kg (270 lb)   Height: 5' 5\" (1.651 m)     -------------------------  BP: 138/84, Temp: 98.1 °F (36.7 °C), Pulse: 51, Resp: 18    Orders Placed This Encounter   Medications    aspirin chewable tablet 324 mg         During the emergency department course, patient was put on the monitor and saline Hep-Lock was started. She was given aspirin 324 mg orally. Cardiac markers, CBC, chemistries and chest x-ray are pending at this time. Case will be turned over to oncoming physician at the end of my shift at 7 AM.    PROCEDURES:  None    FINAL IMPRESSION    No diagnosis found.       DISPOSITION/PLAN PATIENT REFERRED TO:  No follow-up provider specified. DISCHARGE MEDICATIONS:  New Prescriptions    No medications on file       (Please note that portions of this note were completed with a voice recognition program.  Efforts were made to edit the dictations but occasionally words are mis-transcribed.)    Dot Sheppard MD,, MD, F.A.C.E.P.   Attending Emergency Medicine Physician     Dot Sheppard MD  11/04/21 0700

## 2021-11-04 NOTE — ED TRIAGE NOTES
Patient admitted to ED room 6 with an abnormal lab from her doctor's office. Patient's Potassium was 3.1. Patient also c/o chest pressure 3/10 and a headache 6/10. Dr. Juan Grubbstron in to assess patient at this time.

## 2021-11-05 VITALS
WEIGHT: 254.3 LBS | OXYGEN SATURATION: 98 % | BODY MASS INDEX: 42.37 KG/M2 | HEART RATE: 53 BPM | RESPIRATION RATE: 16 BRPM | HEIGHT: 65 IN | TEMPERATURE: 98.1 F | SYSTOLIC BLOOD PRESSURE: 119 MMHG | DIASTOLIC BLOOD PRESSURE: 76 MMHG

## 2021-11-05 LAB
ALBUMIN SERPL-MCNC: 3.7 G/DL (ref 3.5–5.2)
ALBUMIN/GLOBULIN RATIO: 1.8 (ref 1–2.5)
ALP BLD-CCNC: 82 U/L (ref 35–104)
ALT SERPL-CCNC: 10 U/L (ref 5–33)
ANION GAP SERPL CALCULATED.3IONS-SCNC: 9 MMOL/L (ref 9–17)
ANION GAP SERPL CALCULATED.3IONS-SCNC: 9 MMOL/L (ref 9–17)
AST SERPL-CCNC: 18 U/L
BILIRUB SERPL-MCNC: 0.34 MG/DL (ref 0.3–1.2)
BUN BLDV-MCNC: 6 MG/DL (ref 6–20)
BUN BLDV-MCNC: 6 MG/DL (ref 6–20)
BUN/CREAT BLD: ABNORMAL (ref 9–20)
BUN/CREAT BLD: ABNORMAL (ref 9–20)
CALCIUM SERPL-MCNC: 8.5 MG/DL (ref 8.6–10.4)
CALCIUM SERPL-MCNC: 8.6 MG/DL (ref 8.6–10.4)
CHLORIDE BLD-SCNC: 101 MMOL/L (ref 98–107)
CHLORIDE BLD-SCNC: 99 MMOL/L (ref 98–107)
CO2: 28 MMOL/L (ref 20–31)
CO2: 29 MMOL/L (ref 20–31)
CREAT SERPL-MCNC: 0.78 MG/DL (ref 0.5–0.9)
CREAT SERPL-MCNC: 0.84 MG/DL (ref 0.5–0.9)
EKG ATRIAL RATE: 45 BPM
EKG ATRIAL RATE: 52 BPM
EKG ATRIAL RATE: 58 BPM
EKG P AXIS: 21 DEGREES
EKG P AXIS: 30 DEGREES
EKG P AXIS: 49 DEGREES
EKG P-R INTERVAL: 156 MS
EKG P-R INTERVAL: 158 MS
EKG P-R INTERVAL: 162 MS
EKG Q-T INTERVAL: 438 MS
EKG Q-T INTERVAL: 448 MS
EKG Q-T INTERVAL: 472 MS
EKG QRS DURATION: 100 MS
EKG QRS DURATION: 96 MS
EKG QRS DURATION: 98 MS
EKG QTC CALCULATION (BAZETT): 407 MS
EKG QTC CALCULATION (BAZETT): 408 MS
EKG QTC CALCULATION (BAZETT): 439 MS
EKG R AXIS: -4 DEGREES
EKG R AXIS: 0 DEGREES
EKG R AXIS: 13 DEGREES
EKG T AXIS: -57 DEGREES
EKG T AXIS: 15 DEGREES
EKG T AXIS: 25 DEGREES
EKG VENTRICULAR RATE: 45 BPM
EKG VENTRICULAR RATE: 52 BPM
EKG VENTRICULAR RATE: 58 BPM
GFR AFRICAN AMERICAN: >60 ML/MIN
GFR AFRICAN AMERICAN: >60 ML/MIN
GFR NON-AFRICAN AMERICAN: >60 ML/MIN
GFR NON-AFRICAN AMERICAN: >60 ML/MIN
GFR SERPL CREATININE-BSD FRML MDRD: ABNORMAL ML/MIN/{1.73_M2}
GLUCOSE BLD-MCNC: 131 MG/DL (ref 70–99)
GLUCOSE BLD-MCNC: 82 MG/DL (ref 70–99)
HCT VFR BLD CALC: 38 % (ref 36–46)
HEMOGLOBIN: 12.9 G/DL (ref 12–16)
MAGNESIUM: 2 MG/DL (ref 1.6–2.6)
MCH RBC QN AUTO: 30 PG (ref 26–34)
MCHC RBC AUTO-ENTMCNC: 33.9 G/DL (ref 31–37)
MCV RBC AUTO: 88.6 FL (ref 80–100)
NRBC AUTOMATED: NORMAL PER 100 WBC
PDW BLD-RTO: 13.2 % (ref 12.5–15.4)
PLATELET # BLD: 343 K/UL (ref 140–450)
PMV BLD AUTO: 7 FL (ref 6–12)
POTASSIUM SERPL-SCNC: 3.8 MMOL/L (ref 3.7–5.3)
POTASSIUM SERPL-SCNC: 3.8 MMOL/L (ref 3.7–5.3)
RBC # BLD: 4.29 M/UL (ref 4–5.2)
SODIUM BLD-SCNC: 136 MMOL/L (ref 135–144)
SODIUM BLD-SCNC: 139 MMOL/L (ref 135–144)
T3 FREE: 2 PG/ML (ref 2.02–4.43)
THYROXINE, FREE: 0.37 NG/DL (ref 0.93–1.7)
TOTAL PROTEIN: 5.8 G/DL (ref 6.4–8.3)
WBC # BLD: 6.4 K/UL (ref 3.5–11)

## 2021-11-05 PROCEDURE — 84439 ASSAY OF FREE THYROXINE: CPT

## 2021-11-05 PROCEDURE — 80048 BASIC METABOLIC PNL TOTAL CA: CPT

## 2021-11-05 PROCEDURE — 84481 FREE ASSAY (FT-3): CPT

## 2021-11-05 PROCEDURE — 6370000000 HC RX 637 (ALT 250 FOR IP): Performed by: NURSE PRACTITIONER

## 2021-11-05 PROCEDURE — 96375 TX/PRO/DX INJ NEW DRUG ADDON: CPT

## 2021-11-05 PROCEDURE — G0378 HOSPITAL OBSERVATION PER HR: HCPCS

## 2021-11-05 PROCEDURE — 83735 ASSAY OF MAGNESIUM: CPT

## 2021-11-05 PROCEDURE — 93005 ELECTROCARDIOGRAM TRACING: CPT | Performed by: NURSE PRACTITIONER

## 2021-11-05 PROCEDURE — 96372 THER/PROPH/DIAG INJ SC/IM: CPT

## 2021-11-05 PROCEDURE — 85027 COMPLETE CBC AUTOMATED: CPT

## 2021-11-05 PROCEDURE — 99239 HOSP IP/OBS DSCHRG MGMT >30: CPT | Performed by: NURSE PRACTITIONER

## 2021-11-05 PROCEDURE — 2580000003 HC RX 258: Performed by: NURSE PRACTITIONER

## 2021-11-05 PROCEDURE — 6360000002 HC RX W HCPCS: Performed by: NURSE PRACTITIONER

## 2021-11-05 PROCEDURE — 80053 COMPREHEN METABOLIC PANEL: CPT

## 2021-11-05 PROCEDURE — 36415 COLL VENOUS BLD VENIPUNCTURE: CPT

## 2021-11-05 RX ORDER — ARMODAFINIL 250 MG/1
250 TABLET ORAL DAILY
Status: DISCONTINUED | OUTPATIENT
Start: 2021-11-05 | End: 2021-11-05 | Stop reason: HOSPADM

## 2021-11-05 RX ORDER — SUMATRIPTAN 6 MG/.5ML
6 INJECTION, SOLUTION SUBCUTANEOUS ONCE
Status: COMPLETED | OUTPATIENT
Start: 2021-11-05 | End: 2021-11-05

## 2021-11-05 RX ORDER — LEVOTHYROXINE SODIUM 0.1 MG/1
100 TABLET ORAL DAILY
Qty: 90 TABLET | Refills: 0 | Status: SHIPPED | OUTPATIENT
Start: 2021-11-05

## 2021-11-05 ASSESSMENT — PAIN DESCRIPTION - ORIENTATION: ORIENTATION: RIGHT;ANTERIOR

## 2021-11-05 ASSESSMENT — PAIN DESCRIPTION - PAIN TYPE: TYPE: CHRONIC PAIN

## 2021-11-05 ASSESSMENT — PAIN DESCRIPTION - LOCATION: LOCATION: HEAD

## 2021-11-05 ASSESSMENT — PAIN DESCRIPTION - FREQUENCY: FREQUENCY: INTERMITTENT

## 2021-11-05 ASSESSMENT — PAIN SCALES - GENERAL
PAINLEVEL_OUTOF10: 7
PAINLEVEL_OUTOF10: 0

## 2021-11-05 ASSESSMENT — PAIN DESCRIPTION - DESCRIPTORS: DESCRIPTORS: HEADACHE

## 2021-11-05 ASSESSMENT — PAIN - FUNCTIONAL ASSESSMENT: PAIN_FUNCTIONAL_ASSESSMENT: ACTIVITIES ARE NOT PREVENTED

## 2021-11-05 ASSESSMENT — PAIN DESCRIPTION - ONSET: ONSET: AWAKENED FROM SLEEP

## 2021-11-05 ASSESSMENT — PAIN DESCRIPTION - PROGRESSION: CLINICAL_PROGRESSION: NOT CHANGED

## 2021-11-05 NOTE — PLAN OF CARE
Problem: Infection:  See labs  See vital signs  Goal: Will remain free from infection  Description: Will remain free from infection  11/4/2021 2108 by Kelsea Smith RN  Outcome: Ongoing  11/4/2021 1655 by Ashlie Wilkins RN  Outcome: Ongoing     Problem: Safety:  Siderails up x 2  Hourly rounding  Call light in reach  Instructed to call for assist before attempting out of bed.   Remains free from falls and accidental injury at this time   Floor free from obstacles  Bed is locked and in lowest position  Adequate lighting provided  Bed alarm on, [place] star and Stay with Me signs posted      Goal: Free from accidental physical injury  Description: Free from accidental physical injury  11/4/2021 2108 by Kelsea Smith RN  Outcome: Ongoing  11/4/2021 1655 by Ashlie Wilkins RN  Outcome: Ongoing  Goal: Free from intentional harm  Description: Free from intentional harm  11/4/2021 2108 by Kelsea Smith RN  Outcome: Ongoing  11/4/2021 1655 by Ashlie Wilkins RN  Outcome: Ongoing     Problem: Daily Care:  ADLs assessed and addressed  Goal: Daily care needs are met  Description: Daily care needs are met  11/4/2021 2108 by Kelsea Smith RN  Outcome: Ongoing  11/4/2021 1655 by Ashlie Wilkins RN  Outcome: Ongoing     Problem: Pain:  Goal: Patient's pain/discomfort is manageable  Description: Patient's pain/discomfort is manageable  11/4/2021 2108 by Kelsea Smith RN  Outcome: Ongoing  11/4/2021 1655 by Ashlie Wilkins RN  Outcome: Ongoing     Problem: Skin Integrity:  Goal: Skin integrity will stabilize  Description: Skin integrity will stabilize  11/4/2021 2108 by Kelsea Smith RN  Outcome: Ongoing  11/4/2021 1655 by Ashlie Wilkins RN  Outcome: Ongoing     Problem: Discharge Planning:  Goal: Patients continuum of care needs are met  Description: Patients continuum of care needs are met  11/4/2021 2108 by Kelsea Smith RN  Outcome: Ongoing  11/4/2021 1655 by Ashlie Wilkins RN  Outcome: Ongoing

## 2021-11-05 NOTE — PROGRESS NOTES
[facility]  Office: [address], DO, Gregg Pole, DO, Elizabeth Cantrell, DO, Paulino Gay Blood, DO, Meli Lorenzo MD, Amrita Lake MD, Kelby Gutierrez MD, Marlene Reyes MD, Trey Mirza MD, Lidia Bravo MD, Carlee Leroy MD, Kelly Bello MD, Carlos Bernardo, DO, Héctor Porter, DO, Lonnie Mo MD,  Sarah Ramirez, DO, Mell Can MD, Jaylin Freeman MD, Anselmo Martinez MD, Shailesh Liu MD, Schuyler Lee MD, Namrata Chahal MD, Luma Kasper, [facility], [facility], [facility], Coni Cole, [facility], Shameka Alvares, CNS, Silvio Mccrary, CNP, Urbano Jones, CNP, Elyse Jacques, CNP, Lj Haas, CNP, Go Celis, CNP, Randy Browne, CNP, Shannon Castillo PA-C, Arley Alexander, [facility], Yoly Olivia, CNP, Fatimah Crouch, CNP, Gen Vieyra, CNP, Zachary Garay, CNP, Jorge A Troncoso, CNP, Buck Foy, CNP, Conor Sharpe, [address] [place] [address]    Progress Note    11/5/2021    7:21 AM    Name:   Jose Abdalla  MRN:     [id number]     Acct:      [de-identified]   Room:   [address] [place] [address] Day:  1  Admit Date:  11/4/2021  6:43 AM    PCP:   VIVIEN Dietrich CNP  Code Status:  Full Code    Subjective:     C/C:   Chief Complaint   Patient presents with    Abnormal Lab     pt states K was 3.1 on Monday     Interval History Status: improved. Patient seen and evaluated in room having breakfast resting in bed. Patient chronically gets migraine headaches, on current treatment and follows with neurology as an outpatient. TSH significantly elevated on presentation, discussed case with patient's endocrinologist.  Patient received Solu-Cortef yesterday as well as IV Synthroid. CP overnight with negative tropes and EKG, discussed with cardiology prior to discharge. Patient to follow-up with her endocrinologist as outpatient. Stable for discharge    Brief History:      This is a 78-year-old female with a past medical history significant for thyroid cancer status post thyroidectomy and iodine ablation in 2017 (follows with Dr. Omar Fernández), chronic hypokalemia, cardiogenic syncope, migraine disorder, history of Demetria-en-Y, who presents to the emergency department with a 1 day history of exertional chest pain/pressure. Per patient she started encountering the symptoms approximately 1 day ago with walking. States it felt like somebody was sitting on her chest.  Patient also endorses associated shortness of breath, fatigue and headache which was abnormal from her typical migraine presentation. She presented to the emergency department for further evaluation and work-up.     In the emergency department, diagnostics included EKG which demonstrated sinus bradycardia at a rate of 48 bpm.  Troponin was negative. Other metabolic and hematologic abnormalities on presentation included hypokalemia with potassium of 3.1, slightly elevated D-dimer at 0.57. CTa negative for PE. Significant to note patient's TSH came back significantly elevated at 59.71. She was just at her endocrinologist's in August 2021 where her TSH was 0.71, follow-up T4 was 0.58 at that time. Patient states she has not missed any doses of her [place] or levothyroxine. She states approximately 2 years ago she was started on liquid thyroid supplementation due to poor p.o. absorption with her history of Demetria-en-Y. Patient states at that time in 2019 her TSH was 55.29. Patient states this is the highest her TSH has ever been. Review of Systems:     Constitutional:  negative for chills, fevers, sweats, + HA  Respiratory:  negative for cough, dyspnea on exertion, shortness of breath, wheezing  Cardiovascular:  negative for chest pain, chest pressure/discomfort, lower extremity edema, palpitations  Gastrointestinal:  negative for abdominal pain, constipation, diarrhea, nausea, vomiting  Neurological:  negative for dizziness, headache    Medications: Allergies:     Allergies   Allergen Reactions    Amoxicillin-Pot Clavulanate Hives     Other reaction(s): rash    Amoxil [Amoxicillin] Hives    Cefdinir Hives and Rash           Frovatriptan Hives           Metoclopramide Other (See Comments)     Other reaction(s): JITTERY/ELEVATED BP        Morphine Itching    Nsaids Other (See Comments)     Patient Gets Stomach Ulcers    Shrimp (Diagnostic) Hives    Shrimp Extract Allergy Skin Test Hives       Current Meds:   Scheduled Meds:    spironolactone  12.5 mg Oral Daily    fludrocortisone  0.1 mg Oral Daily    FLUoxetine  20 mg Oral Daily    magnesium oxide  400 mg Oral BID    therapeutic multivitamin-minerals  1 tablet Oral Daily    pantoprazole  40 mg Oral QAM AC    potassium chloride  10 mEq Oral TID    sodium chloride flush  5-40 mL IntraVENous 2 times per day    enoxaparin  40 mg SubCUTAneous Daily    levothyroxine  75 mcg Oral Daily    thyroid  90 mg Oral BID     Continuous Infusions:    sodium chloride       PRN Meds: sodium chloride flush, sodium chloride flush, sodium chloride, ondansetron **OR** ondansetron, acetaminophen **OR** acetaminophen, magnesium hydroxide, potassium chloride **OR** potassium alternative oral replacement **OR** potassium chloride, potassium chloride, magnesium sulfate, nitroGLYCERIN, perflutren lipid microspheres, butalbital-acetaminophen-caffeine, Ubrogepant, hydrALAZINE    Data:     Past Medical History:   has a past medical history of Arrhythmia, Depression, GERD (gastroesophageal reflux disease), Hyperthyroidism, Migraine, Neurologic cardiac syncope, and Thyroid cancer ([facility] [address] Social History:   reports that she has never smoked. She has never used smokeless tobacco. She reports that she does not drink alcohol and does not use drugs.      Family History:   Family History   Problem Relation Age of Onset    Thyroid Disease Paternal Grandfather    Guillaume Migraines Mother     Other Mother 43        uterine fibroids, DEXTER/BSO     High Blood Pressure Father     Heart Disease Father     Migraines Sister     Breast Cancer Maternal Cousin         1st cousin once removed     Thyroid Cancer Maternal Cousin     No Known Problems Brother     No Known Problems Sister        Vitals:  /74   Pulse (!) 45   Temp 97.7 °F (36.5 °C) (Oral)   Resp 17   Ht 5' 5\" (1.651 m)   Wt 254 lb 4.8 oz (115.3 kg)   SpO2 97%   BMI 42.32 kg/m²   Temp (24hrs), Av.3 °F (36.8 °C), Min:97.7 °F (36.5 °C), Max:98.6 °F (37 °C)    No results for input(s): POCGLU in the last 72 hours. I/O (24Hr): Intake/Output Summary (Last 24 hours) at 2021  Last data filed at 2021 1659  Gross per 24 hour   Intake 480 ml   Output --   Net 480 ml       Labs:  Hematology:  Recent Labs     21  06   WBC 5.3 6.4   RBC 4.58 4.29   HGB 13.9 12.9   HCT 40.3 38.0   MCV 88.0 88.6   MCH 30.4 30.0   MCHC 34.5 33.9   RDW 13.1 13.2    343   MPV 7.0 7.0   DDIMER 0.57  --      Chemistry:  Recent Labs     21  0001 21  06      < > 137 136 139   K 3.1*   < > 3.7 3.8 3.8   CL 98   < > 100 99 101   CO2 31   < > 28 28 29   GLUCOSE 90   < > 117* 131* 82   BUN 5*   < > 5* 6 6   CREATININE 0.80   < > 0.85 0.78 0.84   MG 2.0  --  2.0  --  2.0   ANIONGAP 9   < > 9 9 9   LABGLOM >60   < > >60 >60 >60   GFRAA >60   < > >60 >60 >60   CALCIUM 8.7   < > 8.7 8.6 8.5*   TROPHS 6  --  <6  --   --     < > = values in this interval not displayed.      Recent Labs     21/05/21  0604   PROT  --  5.8*   LABALBU  --  3.7   TSH 59.71*  --    AST  --  18   ALT  --  10   ALKPHOS  --  82   BILITOT  --  0.34   CHOL 242*  --    HDL 67  --    LDLCHOLESTEROL 157*  --    CHOLHDLRATIO 3.6  --    TRIG 88  --    VLDL NOT REPORTED  --        Lab Results   Component Value Date/Time    SPECIAL NOT REPORTED 2019 12:22 PM     Lab Results   Component Value Date/Time    CULTURE NO SIGNIFICANT GROWTH 2019 12:22 PM

## 2021-11-05 NOTE — PROGRESS NOTES
[facility]   Progress Note                   Date:   11/5/2021  Patient name: Rosa Grace  Date of admission:  11/4/2021  6:43 AM  MRN:   [id number]  YOB: 1985  PCP: VIVIEN Rodriguez CNP    Reason for Admission: Hypokalemia [E87.6]  Bradycardia [R00.1]  Essential hypertension [I10]  Chest pain, unspecified type [R07.9]    Subjective:       Clinical Changes / Abnormalities: Pt seen and examined in the room. Pt denies any CP or SOB at this time. HR stable in 50's. Pt had some pain overnight- EKG unchanged, negative trop. Medications:   Scheduled Meds:   Armodafinil  250 mg Oral Daily    spironolactone  12.5 mg Oral Daily    FLUoxetine  20 mg Oral Daily    magnesium oxide  400 mg Oral BID    therapeutic multivitamin-minerals  1 tablet Oral Daily    pantoprazole  40 mg Oral QAM AC    potassium chloride  10 mEq Oral TID    sodium chloride flush  5-40 mL IntraVENous 2 times per day    enoxaparin  40 mg SubCUTAneous Daily    levothyroxine  75 mcg Oral Daily    thyroid  90 mg Oral BID     Continuous Infusions:   sodium chloride       CBC:   Recent Labs     11/04/21 0655 11/05/21 0604   WBC 5.3 6.4   HGB 13.9 12.9    343     BMP:    Recent Labs     11/04/21 2007 11/05/21  0001 11/05/21  0604    136 139   K 3.7 3.8 3.8    99 101   CO2 28 28 29   BUN 5* 6 6   CREATININE 0.85 0.78 0.84   GLUCOSE 117* 131* 82     Hepatic:   Recent Labs     11/05/21  0604   AST 18   ALT 10   BILITOT 0.34   ALKPHOS 82     Troponin:   Recent Labs     11/04/21 0655 11/04/21 2007   TROPHS 6 <6     BNP: No results for input(s): BNP in the last 72 hours. Lipids:   Recent Labs     11/04/21  0655   CHOL 242*   HDL 67     INR: No results for input(s): INR in the last 72 hours.     Objective:   Vitals: /76   Pulse 53   Temp 98.1 °F (36.7 °C) (Oral)   Resp 16   Ht 5' 5\" (1.651 m)   Wt 254 lb 4.8 oz (115.3 kg)   SpO2 98%   BMI 42.32 kg/m²   General appearance: alert and cooperative with exam  HEENT: Head: Normocephalic, no lesions, without obvious abnormality. Neck: no JVD, trachea midline, no adenopathy  Lungs: Clear to auscultation  Heart: Regular rate and rhythm, s1/s2 auscultated, no murmurs, SB   Abdomen: soft, non-tender, bowel sounds active  Extremities: no edema  Neurologic: not done      ECHO 11/4/21  Summary  Normal left ventricular size with normal hyperdynamic function. Left ventricular ejection fraction 65 %. Mild left ventricular hypertrophy. Trivial tricuspid regurgitation. Estimated right ventricular systolic pressure is 21 mmHg. Assessment / Acute Cardiac Problems:   1. Bradycardia  2. Atypical cp     Patient Active Problem List:     Depression     Arrhythmia     Neurogenic syncope     Migraine     Encounter to establish care     Vitamin D deficiency     Elevated fasting glucose     Hypokalemia     Anxiety     Bradycardia     Chest pain     Postoperative hypothyroidism     Adenomatous goiter, toxic or with hyperthyroidism     Benign essential hypertension     Fatigue     History of Demetria-en-Y gastric bypass     Iron deficiency anemia     Malignant tumor of thyroid gland (HCC)     Morbid obesity with BMI of 40.0-44.9, adult ([facility] [address]      Plan of Treatment:   1. Sinus Bradycardia secondary to hypothyroidism. Stable. 2. Atypical CP. No acute EKG changes, Negative trops. ECHO wnl. No further workup at this time   3. Will sign off. Plan for outpt stress test in the future once acute issues resolve. Please call with further questions or concerns.      Electronically signed by VIVIEN Liu CNP on 11/5/2021 at [address] [place] [address].  [phone]

## 2021-11-05 NOTE — DISCHARGE SUMMARY
[facility]  Office: [address], DO, [facility], DO, Viri Allison, DO, Catalino Dozier, DO, Grady William MD, Awa Montalvo MD, Kennis Osler, MD, Kanika Downey MD, Nicolasa Miranda MD, Sagrario Givens MD, Jessica Rosado MD, Nini Combs MD, Juanito Villalta, DO, Joesph Sotomayor, DO, Jazzmine Julio MD,  Josh Camara DO, Perla Baker MD, Nahomi Villalpando MD, Edgardo Quezada MD, Skye Kelley MD, Kelle Che MD, Neda Torres MD, Milana Rodrigues [facility], [facility], CNP, Lynsey Espinosa, CNP, Jonathan Boudreaux, CNS, Otoniel Morataya, CNP, Darrel Vogel, CNP, Carlo Stubbs, CNP, Sylwia Mccarthy, CNP, Homer Lucero, CNP, Armen Mixon, CNP, Monica Harris PA-C, Oscar Son DNP, Fior Varela, CNP, Akila Edwards, CNP, Thania Hernandez CNP, Sirisha Xiong CNP, Anisha Roy, CNP, Benson Ch, CNP, Ivory Nolan,  [facility]    Discharge Summary     Patient ID: Manny Lozoya  :  1985   MRN: [id number]     ACCOUNT:  [de-identified]   Patient's PCP: VIVIEN Garrido CNP  Admit Date: 2021   Discharge Date: 2021     Length of Stay: 1  Code Status:  Full Code  Admitting Physician: Viri Dozier DO  Discharge Physician: VIVIEN Glsas NP     Active Discharge Diagnoses:     Hospital Problem Lists:  Principal Problem:    Chest pain  Active Problems:    Neurogenic syncope    Migraine    Hypokalemia    Bradycardia    Postoperative hypothyroidism    Adenomatous goiter, toxic or with hyperthyroidism    Benign essential hypertension    Fatigue    History of Demetria-en-Y gastric bypass    Iron deficiency anemia    Malignant tumor of thyroid gland ([facility]    Morbid obesity with BMI of 40.0-44.9, adult ([facility]  Resolved Problems:    * No resolved hospital problems.  *      Admission Condition:  good     Discharged Condition: good    Hospital Stay:     Hospital Course:  Manny Lozoya is a 43 y.o. female who was admitted for the management of   Chest pain , presented to ER with Abnormal Lab (pt states K was 3.1 on Monday)    This is a 76-year-old female with a past medical history significant for thyroid cancer status post thyroidectomy and iodine ablation in 2017 (follows with Dr. Anuradha Duque), chronic hypokalemia, cardiogenic syncope, migraine disorder, history of Demetria-en-Y, who presents to the emergency department with a 1 day history of exertional chest pain/pressure.  Per patient she started encountering the symptoms approximately 1 day ago with walking.  States it felt like somebody was sitting on her chest.  Patient also endorses associated shortness of breath, fatigue and headache which was abnormal from her typical migraine presentation.  She presented to the emergency department for further evaluation and work-up.     In the emergency department, diagnostics included EKG which demonstrated sinus bradycardia at a rate of 48 bpm.  Troponin was negative.  Other metabolic and hematologic abnormalities on presentation included hypokalemia with potassium of 3.1, slightly elevated D-dimer at 0.57.  CTa negative for PE.  Significant to note patient's TSH came back significantly elevated at 59.71.  She was just at her endocrinologist's in August 2021 where her TSH was 0.71, follow-up T4 was 0.58 at that time.  Patient states she has not missed any doses of her [place] or levothyroxine. Yovani Nava states approximately 2 years ago she was started on liquid thyroid supplementation due to poor p.o. absorption with her history of Demetria-en-Y.  Patient states at that time in 2019 her TSH was 55. 34.  Patient states this is the highest her TSH has ever been.     Patient seen and evaluated in room having breakfast resting in bed. Patient chronically gets migraine headaches, on current treatment and follows with neurology as an outpatient.   TSH significantly elevated on presentation, discussed case with patient's endocrinologist.  Patient received Solu-Cortef yesterday as well as IV Synthroid. CP overnight with negative tropes and EKG, discussed with cardiology prior to discharge. Patient to follow-up with her endocrinologist as outpatient. Stable for discharge    Significant therapeutic interventions:     1. Hypothyroid:   - With history of postoperative hypothyroidism, status post thyroidectomy from thyroid CA.    - Patient follows with endocrinology as outpatient, discussed care with Dr. Tommy Duarte on 11/4    Arnav Boonearch recent outpatient visit in aug 2021 was normal.    - Patient has been taking [place] 90 mg BID as well as her levothyroxine 75 mcg daily without interruption.   - TSH was normal in August 2021 (0.71).     - TSH significantly elevated at 59.71 admission  - Likely complicated by history of Demetria-en-Y malabsorption.    - s/p IV replacement 100 mcg IVP     2. Bradycardia:   - With negative troponins X3  - Cardiology consulted, appreciate recommendations.    - Echo from 11/4  showing an EF of 65% without structure or valvular abnormalities  - likely as a result of hypothyroid  - Continuous telemetry monitoring  -Discussed with cardiologyrajat for discharge and follow-up with [facility] cardiology as outpatient     3. History of neurogenic syncope:   - On Florinef      4. History of Demetria-en-Y gastric bypass:   - With malabsorption likely complicating absorption of thyroid medications.      5. Chronic hypokalemia:  -On spironolactone equivalent in the outpatient setting     6. Complicated migraines:   - Follows with neurology in the outpatient setting.    - Continue home regimen  - Previous MRI reviewed from May 2021     7. GI/DVT prophylaxis: Protonix, Lovenox      8.  Dispo: home with follow up with endo and neuro as outpatient    Significant Diagnostic Studies:   Labs / Micro:  CBC:   Lab Results   Component Value Date    WBC 6.4 11/05/2021    RBC 4.29 11/05/2021    HGB 12.9 11/05/2021    HCT 38.0 11/05/2021    MCV 88.6 11/05/2021    MCH 30.0 11/05/2021    MCHC 33.9 11/05/2021    RDW 13.2 11/05/2021     11/05/2021     BMP:    Lab Results   Component Value Date    GLUCOSE 82 11/05/2021     11/05/2021    K 3.8 11/05/2021     11/05/2021    CO2 29 11/05/2021    ANIONGAP 9 11/05/2021    BUN 6 11/05/2021    CREATININE 0.84 11/05/2021    BUNCRER NOT REPORTED 11/05/2021    CALCIUM 8.5 11/05/2021    LABGLOM >60 11/05/2021    GFRAA >60 11/05/2021    GFR      11/05/2021    GFR NOT REPORTED 11/05/2021     HFP:    Lab Results   Component Value Date    PROT 5.8 11/05/2021     CMP:    Lab Results   Component Value Date    GLUCOSE 82 11/05/2021     11/05/2021    K 3.8 11/05/2021     11/05/2021    CO2 29 11/05/2021    BUN 6 11/05/2021    CREATININE 0.84 11/05/2021    ANIONGAP 9 11/05/2021    ALKPHOS 82 11/05/2021    ALT 10 11/05/2021    AST 18 11/05/2021    BILITOT 0.34 11/05/2021    LABALBU 3.7 11/05/2021    ALBUMIN 1.8 11/05/2021    LABGLOM >60 11/05/2021    GFRAA >60 11/05/2021    GFR      11/05/2021    GFR NOT REPORTED 11/05/2021    PROT 5.8 11/05/2021    CALCIUM 8.5 11/05/2021     PT/INR:  No results found for: PTINR, PROTIME, INR  PTT: No results found for: APTT  FLP:    Lab Results   Component Value Date    CHOL 242 11/04/2021    TRIG 88 11/04/2021    HDL 67 11/04/2021     U/A:    Lab Results   Component Value Date    COLORU YELLOW 11/05/2019    TURBIDITY SLIGHTLY CLOUDY 11/05/2019    SPECGRAV 1.015 11/05/2019    HGBUR TRACE 11/05/2019    PHUR 7.5 11/05/2019    PROTEINU NEGATIVE 11/05/2019    GLUCOSEU NEGATIVE 11/05/2019    KETUA NEGATIVE 11/05/2019    BILIRUBINUR NEGATIVE 11/05/2019    UROBILINOGEN Normal 11/05/2019    NITRU NEGATIVE 11/05/2019    LEUKOCYTESUR NEGATIVE 11/05/2019     TSH:    Lab Results   Component Value Date    TSH 59.71 11/04/2021        Radiology:  CT CHEST PULMONARY EMBOLISM W CONTRAST    Result Date: 11/4/2021  Negative CTA chest with no evidence of pulmonary embolism or acute pulmonary abnormality. Consultations:    Consults:     Final Specialist Recommendations/Findings:   IP CONSULT TO CARDIOLOGY      The patient was seen and examined on day of discharge and this discharge summary is in conjunction with any daily progress note from day of discharge. Discharge plan:     Disposition: Home    Physician Follow Up:     Kathy Gale APRN - CNP  [address] [facility] [phone]    In 1 week  for follow up after hospitalization    Violet Berman MD  [place] [address] [address]  [phone]    Schedule an appointment as soon as possible for a visit  for follow up after hospitalization    [facility]      for follow up after hospitalization/bradycardia       Requiring Further Evaluation/Follow Up POST HOSPITALIZATION/Incidental Findings: Your levothyroxine dose has been increased from 75 mcg daily to 100 mcg daily. Follow-up with your endocrinologist as well as your cardiologist as indicated. Migraine management per neurology as outpatient    Diet: regular diet    Activity: As tolerated    Instructions to Patient: see attached     Discharge Medications:      Medication List      CHANGE how you take these medications    FLUoxetine 20 MG capsule  Commonly known as: PROZAC  TAKE 2 CAPSULES DAILY  What changed: See the new instructions.      levothyroxine 100 MCG tablet  Commonly known as: SYNTHROID  Take 1 tablet by mouth daily  What changed:   · medication strength  · how much to take  · when to take this        CONTINUE taking these medications    aMILoride 5 MG tablet  Commonly known as: MIDAMOR  Take 1 tablet by mouth daily     azelastine 0.1 % nasal spray  Commonly known as: ASTELIN     butalbital-acetaminophen-caffeine -40 MG per tablet  Commonly known as: FIORICET, ESGIC     cetirizine 10 MG tablet  Commonly known as: ZYRTEC     Magnesium Oxide 500 MG Tabs     naratriptan 2.5 MG tablet  Commonly known as: AMERGE     Nuvigil 250 MG Tabs  Generic drug: Armodafinil     ondansetron 4 MG disintegrating tablet  Commonly known as: ZOFRAN-ODT  Take 1 tablet by mouth every 8 hours as needed for Nausea or Vomiting     pantoprazole 40 MG tablet  Commonly known as: PROTONIX     potassium chloride 10 MEQ extended release capsule  Commonly known as: MICRO-K  Take 1 capsule by mouth 3 times daily for 30 doses     promethazine 25 MG tablet  Commonly known as: PHENERGAN  Take 1 po q 6 hours PRN     SUMAtriptan Succinate 6 MG/0.5ML Soaj     therapeutic multivitamin-minerals tablet     thyroid 90 MG tablet  Commonly known as: ARMOUR     Ubrelvy 50 MG Tabs  Generic drug: Ubrogepant     Vitamin D 1000 units Caps capsule  Commonly known as: CHOLECALCIFEROL           Where to Get Your Medications      These medications were sent to [facility], [address] [facility] [phone]  [place] [address]    Phone: [phone]   · levothyroxine 100 MCG tablet         No discharge procedures on file. Time Spent on discharge is  31 mins in patient examination, evaluation, counseling as well as medication reconciliation, prescriptions for required medications, discharge plan and follow up. Discussed with attending  Electronically signed by   VIVIEN aGrcia NP  11/5/2021  10:33 AM      Thank you VIVIEN Overton CNP for the opportunity to be involved in this patient's care.

## 2021-11-05 NOTE — PLAN OF CARE
Problem: Infection:  Goal: Will remain free from infection  Description: Will remain free from infection  11/5/2021 1059 by Maria D Serrano RN  Outcome: Completed  11/4/2021 2108 by Mendez Chan RN  Outcome: Ongoing     Problem: Safety:  Goal: Free from accidental physical injury  Description: Free from accidental physical injury  11/5/2021 1059 by Maria D Serrano RN  Outcome: Completed  11/4/2021 2108 by Mendez Chan RN  Outcome: Ongoing  Goal: Free from intentional harm  Description: Free from intentional harm  11/5/2021 1059 by Maria D Serrano RN  Outcome: Completed  11/4/2021 2108 by Mendez Chan RN  Outcome: Ongoing     Problem: Daily Care:  Goal: Daily care needs are met  Description: Daily care needs are met  11/5/2021 1059 by Maria D Serrano RN  Outcome: Completed  11/4/2021 2108 by Mendez Chan RN  Outcome: Ongoing     Problem: Pain:  Goal: Patient's pain/discomfort is manageable  Description: Patient's pain/discomfort is manageable  11/5/2021 1059 by Maria D Serrano RN  Outcome: Completed  11/4/2021 2108 by Mendez Chan RN  Outcome: Ongoing     Problem: Skin Integrity:  Goal: Skin integrity will stabilize  Description: Skin integrity will stabilize  11/5/2021 1059 by Maria D Serrano RN  Outcome: Completed  11/4/2021 2108 by Mendez Chan RN  Outcome: Ongoing     Problem: Discharge Planning:  Goal: Patients continuum of care needs are met  Description: Patients continuum of care needs are met  11/5/2021 1059 by Maria D Serrano RN  Outcome: Completed  11/4/2021 2108 by Mendez Chan RN  Outcome: Ongoing

## 2021-11-06 ENCOUNTER — APPOINTMENT (OUTPATIENT)
Dept: GENERAL RADIOLOGY | Age: 36
End: 2021-11-06
Payer: COMMERCIAL

## 2021-11-06 ENCOUNTER — HOSPITAL ENCOUNTER (EMERGENCY)
Age: 36
Discharge: HOME | End: 2021-11-06
Attending: EMERGENCY MEDICINE
Payer: COMMERCIAL

## 2021-11-06 VITALS
TEMPERATURE: 99 F | DIASTOLIC BLOOD PRESSURE: 91 MMHG | BODY MASS INDEX: 40.98 KG/M2 | WEIGHT: 255 LBS | SYSTOLIC BLOOD PRESSURE: 144 MMHG | HEART RATE: 62 BPM | HEIGHT: 66 IN | RESPIRATION RATE: 16 BRPM | OXYGEN SATURATION: 98 %

## 2021-11-06 DIAGNOSIS — R07.9 CHEST PAIN, UNSPECIFIED TYPE: Primary | ICD-10-CM

## 2021-11-06 LAB
ABSOLUTE EOS #: 0.1 K/UL (ref 0–0.4)
ABSOLUTE IMMATURE GRANULOCYTE: ABNORMAL K/UL (ref 0–0.3)
ABSOLUTE LYMPH #: 1.5 K/UL (ref 1–4.8)
ABSOLUTE MONO #: 0.5 K/UL (ref 0.1–1.2)
ALBUMIN SERPL-MCNC: 4.1 G/DL (ref 3.5–5.2)
ALBUMIN/GLOBULIN RATIO: 1.8 (ref 1–2.5)
ALP BLD-CCNC: 101 U/L (ref 35–104)
ALT SERPL-CCNC: 11 U/L (ref 5–33)
ANION GAP SERPL CALCULATED.3IONS-SCNC: 9 MMOL/L (ref 9–17)
AST SERPL-CCNC: 21 U/L
BASOPHILS # BLD: 1 % (ref 0–2)
BASOPHILS ABSOLUTE: 0.1 K/UL (ref 0–0.2)
BILIRUB SERPL-MCNC: 0.21 MG/DL (ref 0.3–1.2)
BUN BLDV-MCNC: 7 MG/DL (ref 6–20)
BUN/CREAT BLD: ABNORMAL (ref 9–20)
CALCIUM SERPL-MCNC: 8.4 MG/DL (ref 8.6–10.4)
CHLORIDE BLD-SCNC: 100 MMOL/L (ref 98–107)
CO2: 29 MMOL/L (ref 20–31)
CREAT SERPL-MCNC: 0.84 MG/DL (ref 0.5–0.9)
DIFFERENTIAL TYPE: ABNORMAL
EOSINOPHILS RELATIVE PERCENT: 2 % (ref 1–4)
GFR AFRICAN AMERICAN: >60 ML/MIN
GFR NON-AFRICAN AMERICAN: >60 ML/MIN
GFR SERPL CREATININE-BSD FRML MDRD: ABNORMAL ML/MIN/{1.73_M2}
GFR SERPL CREATININE-BSD FRML MDRD: ABNORMAL ML/MIN/{1.73_M2}
GLUCOSE BLD-MCNC: 99 MG/DL (ref 70–99)
HCG QUALITATIVE: NEGATIVE
HCT VFR BLD CALC: 39.9 % (ref 36–46)
HEMOGLOBIN: 13.5 G/DL (ref 12–16)
IMMATURE GRANULOCYTES: ABNORMAL %
LIPASE: 16 U/L (ref 13–60)
LYMPHOCYTES # BLD: 21 % (ref 24–44)
MCH RBC QN AUTO: 30.1 PG (ref 26–34)
MCHC RBC AUTO-ENTMCNC: 33.9 G/DL (ref 31–37)
MCV RBC AUTO: 88.9 FL (ref 80–100)
MONOCYTES # BLD: 7 % (ref 2–11)
NRBC AUTOMATED: ABNORMAL PER 100 WBC
PDW BLD-RTO: 13.2 % (ref 12.5–15.4)
PLATELET # BLD: 346 K/UL (ref 140–450)
PLATELET ESTIMATE: ABNORMAL
PMV BLD AUTO: 6.7 FL (ref 6–12)
POTASSIUM SERPL-SCNC: 3.6 MMOL/L (ref 3.7–5.3)
RBC # BLD: 4.48 M/UL (ref 4–5.2)
RBC # BLD: ABNORMAL 10*6/UL
SEG NEUTROPHILS: 69 % (ref 36–66)
SEGMENTED NEUTROPHILS ABSOLUTE COUNT: 5.2 K/UL (ref 1.8–7.7)
SODIUM BLD-SCNC: 138 MMOL/L (ref 135–144)
TOTAL PROTEIN: 6.4 G/DL (ref 6.4–8.3)
TROPONIN INTERP: NORMAL
TROPONIN T: NORMAL NG/ML
TROPONIN, HIGH SENSITIVITY: <6 NG/L (ref 0–14)
WBC # BLD: 7.4 K/UL (ref 3.5–11)
WBC # BLD: ABNORMAL 10*3/UL

## 2021-11-06 PROCEDURE — 6370000000 HC RX 637 (ALT 250 FOR IP): Performed by: PHYSICIAN ASSISTANT

## 2021-11-06 PROCEDURE — 80053 COMPREHEN METABOLIC PANEL: CPT

## 2021-11-06 PROCEDURE — 84484 ASSAY OF TROPONIN QUANT: CPT

## 2021-11-06 PROCEDURE — 6360000002 HC RX W HCPCS: Performed by: EMERGENCY MEDICINE

## 2021-11-06 PROCEDURE — 93005 ELECTROCARDIOGRAM TRACING: CPT | Performed by: PHYSICIAN ASSISTANT

## 2021-11-06 PROCEDURE — 99282 EMERGENCY DEPT VISIT SF MDM: CPT

## 2021-11-06 PROCEDURE — 36415 COLL VENOUS BLD VENIPUNCTURE: CPT

## 2021-11-06 PROCEDURE — 71045 X-RAY EXAM CHEST 1 VIEW: CPT

## 2021-11-06 PROCEDURE — 84703 CHORIONIC GONADOTROPIN ASSAY: CPT

## 2021-11-06 PROCEDURE — 85025 COMPLETE CBC W/AUTO DIFF WBC: CPT

## 2021-11-06 PROCEDURE — 83690 ASSAY OF LIPASE: CPT

## 2021-11-06 PROCEDURE — 96374 THER/PROPH/DIAG INJ IV PUSH: CPT

## 2021-11-06 RX ORDER — DEXAMETHASONE SODIUM PHOSPHATE 10 MG/ML
10 INJECTION INTRAMUSCULAR; INTRAVENOUS ONCE
Status: COMPLETED | OUTPATIENT
Start: 2021-11-06 | End: 2021-11-06

## 2021-11-06 RX ORDER — PREDNISONE 10 MG/1
TABLET ORAL
Qty: 20 TABLET | Refills: 0 | Status: SHIPPED | OUTPATIENT
Start: 2021-11-06 | End: 2021-11-16

## 2021-11-06 RX ORDER — MAGNESIUM HYDROXIDE/ALUMINUM HYDROXICE/SIMETHICONE 120; 1200; 1200 MG/30ML; MG/30ML; MG/30ML
15 SUSPENSION ORAL
Status: COMPLETED | OUTPATIENT
Start: 2021-11-06 | End: 2021-11-06

## 2021-11-06 RX ORDER — LIDOCAINE HYDROCHLORIDE 20 MG/ML
15 SOLUTION OROPHARYNGEAL ONCE
Status: COMPLETED | OUTPATIENT
Start: 2021-11-06 | End: 2021-11-06

## 2021-11-06 RX ADMIN — ALUMINUM HYDROXIDE, MAGNESIUM HYDROXIDE, AND SIMETHICONE 15 ML: 200; 200; 20 SUSPENSION ORAL at 18:38

## 2021-11-06 RX ADMIN — DEXAMETHASONE SODIUM PHOSPHATE 10 MG: 10 INJECTION INTRAMUSCULAR; INTRAVENOUS at 18:38

## 2021-11-06 RX ADMIN — LIDOCAINE HYDROCHLORIDE 15 ML: 20 SOLUTION ORAL; TOPICAL at 18:38

## 2021-11-06 ASSESSMENT — PAIN DESCRIPTION - LOCATION: LOCATION: CHEST

## 2021-11-06 ASSESSMENT — PAIN SCALES - GENERAL: PAINLEVEL_OUTOF10: 9

## 2021-11-06 ASSESSMENT — PAIN DESCRIPTION - PAIN TYPE: TYPE: ACUTE PAIN

## 2021-11-06 NOTE — ED PROVIDER NOTES
[address] [facility]  [address] [address] [facility] [place] [address]  Phone: [phone]  Fax: [phone]      Attending Physician [address]       Chief Complaint   Patient presents with    Chest Pain     started thursday, chest pressure, \"it hurts to talk\" per patient       DIAGNOSTIC RESULTS     LABS:  Labs Reviewed   CBC WITH AUTO DIFFERENTIAL - Abnormal; Notable for the following components:       Result Value    Seg Neutrophils 69 (*)     Lymphocytes 21 (*)     All other components within normal limits   COMPREHENSIVE METABOLIC PANEL   LIPASE   HCG, SERUM, QUALITATIVE   TROPONIN       All other labs were within normal range or not returned as of this dictation. RADIOLOGY:  XR CHEST PORTABLE    (Results Pending)         EMERGENCY DEPARTMENT COURSE:   Vitals:    Vitals:    11/06/21 1758   BP: (!) 144/91   Pulse: 62   Resp: 16   Temp: 99 °F (37.2 °C)   TempSrc: Oral   SpO2: 98%   Weight: 115.7 kg (255 lb)   Height: 5' 5.5\" (1.664 m)     -------------------------  BP: (!) 144/91, Temp: 99 °F (37.2 °C), Pulse: 62, Resp: 16             PERTINENT ATTENDING PHYSICIAN COMMENTS:    I performed a history and physical examination of the patient and discussed management with the mid level provider. I reviewed the mid level provider's note and agree with the documented findings and plan of care. Any areas of disagreement are noted on the chart. I was personally present for the key portions of any procedures. I have documented in the chart those procedures where I was not present during the key portions. I have reviewed the emergency nurses triage note. I agree with the chief complaint, past medical history, past surgical history, allergies, medications, social and family history as documented unless otherwise noted below.  Documentation of the HPI, Physical Exam and Medical Decision Making performed by mid level providers is based on my personal performance of the HPI, PE and MDM. For Physician Assistant/ Nurse Practitioner cases/documentation I have personally evaluated this patient and have completed at least one if not all key elements of the E/M (history, physical exam, and MDM). Additional findings are as noted. Evaluate this patient, she had presented with chest pain earlier this week, she was admitted, she had full cardiac work-up, she has been cleared, her pain seems atypical/possibly musculoskeletal or even pleuritic. Lower suspicion for GI but certainly this could be contributing. She is already had a CT to rule out a pulmonary embolus.   Or getting blood work again today, another troponin to compare to previous, no other x-ray, will try some Decadron IV and GI cocktail and will reevaluate      (Please note that portions of this note were completed with a voice recognition program.  Efforts were made to edit the dictations but occasionally words are mis-transcribed.)    Patel Kramer DO,  Attending Emergency Medicine Physician       Patel Kramer DO  11/06/21 3105

## 2021-11-06 NOTE — ED PROVIDER NOTES
[address] [facility] ED  [address] [facility] [address]  Phone: [phone]  Fax: Denice Fowler 112      Pt Name: José Miguel Aiken  MRN: [id number]  [place] 1985  Date of evaluation: 11/6/2021  Provider: Raul Rodriguez PA-C    CHIEF COMPLAINT       Chief Complaint   Patient presents with    Chest Pain     started thursday, chest pressure, \"it hurts to talk\" per patient         HISTORY OF PRESENT ILLNESS  (Location/Symptom, Timing/Onset, Context/Setting, Quality, Duration, Modifying Factors, Severity.)   José Miguel Aiken is a 43 y.o. female who presents to the emergency department complaining of chest symptoms. Context/Setting:   Patient here for recurrence of nontraumatic/afebrile mid and upper chest pain that started around noon today. Patient states is been persistent since that time. Patient was just admitted 2 days ago and discharged yesterday for similar chest pain had a negative Echocardiagram/CT chest for PE and cardiac consultation at that time. Serial troponins were normal.  Patient states that the area of pain is the same but the severity of pain and pressure is higher. She denies any significant shortness of breath, pleuritic pain or new cough. There is no associated back or abdominal pain. She reports a history of neurocardiogenic syncope but otherwise has no other definitive cardiac conditions. No previous thrombotic history.     Timing/Onset:   As above  Quality:   Sharp  Pressure   Duration:   As above  Modifying Factors:   As above  Severity:   Moderate      Heart Score         Score 0 - 3 = 2.5%  MACE over next 6 wks = Discharge home  Score 4 - 6 = 20.3%  MACE over next 6 wks = Obs admit  Score 7 - 10 = 72.7%  MACE over next 6 wks = Early invasive Rx       HEART Risk Score:   History         Highly Suspicious                    2                      Moderately Suspicious            1                      Slight Suspicious 0  EKG            Significant ST Depression       2                      Nonspecific                              1                      Normal                                     0  Age              > or = 65                                  2                      > 45 to < 65                             1                     < or = 45                                  0  Risk Factors    > or = 3                         2                          1 or 2                            1                          0                                 0  Troponin          > or = 3 times normal limit       2                          > 1 and < 3 times limit             1                          Normal                                     0  Score               0 - 3      Low Risk                           4 - 6      Moderate risk                           7 - 10    High Risk    * Risk Factors include: Diabetes, Tobacco use, Hypertension, Hyperlipidemia, Family History of CAD and Obesity      PERC Criteria     Age      > or = 50    No  Heart Rate     > or = 100   No  Pulse Oximetry    >  95%    Yes  Previous History of DVT   No  Trauma or Surgery within 4 Weeks-   No  Hemoptysis-    No  Exogenous Estrogen use-  No  Unilateral Leg Swelling-   No         Nursing Notes were reviewed. REVIEW OF SYSTEMS    (2-9 systems for level 4, 10 or more for level 5)     Constitutional: Denies recent fever, chills. Eyes: No visual changes. Neck: No neck pain. Respiratory: per HPI  Cardiac:  per HPI  GI:  Denies abdominal pain/nausea/vomiting/diarrhea. Musculoskeletal: Denies focal weakness. Neurologic: per HPI  Skin:  No rash. Except as noted above the remainder of the review of systems was reviewed and negative. PAST MEDICAL HISTORY   History reviewed.         Diagnosis Date    Arrhythmia 4/7/2011    Depression 4/7/2011    GERD (gastroesophageal reflux disease)     Gastoric presis     Hyperthyroidism     Migraine 2011    Neurologic cardiac syncope 2011    Thyroid cancer [facility])          SURGICAL HISTORY           Procedure Laterality Date    APPENDECTOMY      BREAST LUMPECTOMY Left     5 lumps removed out of left breast      SECTION      2 C- Sections     GALLBLADDER SURGERY      GASTRIC BYPASS SURGERY      MICHELLE-EN-Y GASTRIC BYPASS N/A     Patient had to get gastric bypass reversed due to Ulcers removed     THYROIDECTOMY Bilateral     whole thyroid removed        CURRENT MEDICATIONS       Discharge Medication List as of 2021  7:42 PM      CONTINUE these medications which have NOT CHANGED    Details   levothyroxine (SYNTHROID) 100 MCG tablet Take 1 tablet by mouth daily, Disp-90 tablet, R-0Normal      Multiple Vitamins-Minerals (THERAPEUTIC MULTIVITAMIN-MINERALS) tablet Take 1 tablet by mouth dailyHistorical Med      ondansetron (ZOFRAN-ODT) 4 MG disintegrating tablet Take 1 tablet by mouth every 8 hours as needed for Nausea or Vomiting, Disp-30 tablet, R-0Normal      FLUoxetine (PROZAC) 20 MG capsule TAKE 2 CAPSULES DAILY, Disp-180 capsule, R-1Normal      aMILoride (MIDAMOR) 5 MG tablet Take 1 tablet by mouth daily, Disp-30 tablet, R-5Normal      azelastine (ASTELIN) 0.1 % nasal spray 2 sprays by Nasal route 2 times dailyHistorical Med      butalbital-acetaminophen-caffeine (FIORICET, ESGIC) -40 MG per tablet Take by mouth daily as neededHistorical Med      naratriptan (AMERGE) 2.5 MG tablet Take by mouth daily as neededHistorical Med      UBRELVY 50 MG TABS Take 100 mg by mouth daily as needed for Migraine , DAWHistorical Med      thyroid (ARMOUR) 90 MG tablet Take 90 mg by mouth 2 times daily Historical Med      potassium chloride (MICRO-K) 10 MEQ extended release capsule Take 1 capsule by mouth 3 times daily for 30 doses, Disp-90 capsule, R-1Normal      cetirizine (ZYRTEC) 10 MG tablet Take by mouth nightly Historical Med      SUMAtriptan Succinate 6 MG/0.5ML SOAJ Inject as directed as neededHistorical Med      Armodafinil (NUVIGIL) 250 MG TABS Take by mouth daily. Historical Med      pantoprazole (PROTONIX) 40 MG tablet Take 40 mg by mouth nightly Historical Med      Vitamin D (CHOLECALCIFEROL) 1000 UNITS CAPS capsule Take 4,000 Units by mouth 2 times daily      Magnesium Oxide 500 MG TABS Take 500 mg by mouth 2 times daily      promethazine (PHENERGAN) 25 MG tablet Take 1 po q 6 hours PRN, Disp-30 tablet, R-0             ALLERGIES     Amoxicillin-pot clavulanate, Amoxil [amoxicillin], Cefdinir, Frovatriptan, Metoclopramide, Morphine, Nsaids, Shrimp (diagnostic), and Shrimp extract allergy skin test    FAMILY HISTORY           Problem Relation Age of Onset    Thyroid Disease Paternal Grandfather     Migraines Mother     Other Mother 43        uterine fibroids, DEXTER/BSO     High Blood Pressure Father     Heart Disease Father     Migraines Sister     Breast Cancer Maternal Cousin         1st cousin once removed     Thyroid Cancer Maternal Cousin     No Known Problems Brother     No Known Problems Sister      Family Status   Relation Name Status    PGF  (Not Specified)    Mother  Alive    Father  Alive    Sister  Alive    MCousin  (Not Specified)    Brother  Alive    Sister  Alive        SOCIAL HISTORY      reports that she has never smoked. She has never used smokeless tobacco. She reports that she does not drink alcohol and does not use drugs. Lives with others. PHYSICAL EXAM    (up to 7 for level 4, 8 or more for level 5)     ED Triage Vitals [11/06/21 1758]   BP Temp Temp Source Pulse Resp SpO2 Height Weight   (!) 144/91 99 °F (37.2 °C) Oral 62 16 98 % 5' 5.5\" (1.664 m) 255 lb (115.7 kg)       Constitutional:  Well developed, no pain distress or toxicity. Eyes:  Pupils equal/round  HENT:  Atraumatic, external ears normal, nose normal. Neck- supple   Respiratory:  Clear to auscultation bilaterally with good air exchange, no W/R/R. No chestwall tenderness.  Gestures dexamethasone (DECADRON) injection 10 mg    predniSONE (DELTASONE) 10 MG tablet     Sig: Take 4 tablets by mouth once daily for 5 days     Dispense:  20 tablet     Refill:  0       1823  Recurrence of CP around noon today. More severe but same as previous that she was admitted for 2 days ago. AVSS. LCTA. No pain distress. Cardiac workup initiated. 2032  No acute process or findings on workup today. Discussed close PCP f/u on Monday and will try a short course of Prednisone for inflammation. I was delayed in sending Prednisone rx over to her Pharmacy. This patient was seen by the attending physician and they agreed with the assessment and plan. I have reviewed the disposition diagnosis with the patient and or their family/guardian. I have answered their questions and given discharge instructions. They voiced understanding of these instructions and did not have any further questions or complaints. The patient presents with chest pain that is not suggestive of in nature of pulmonary embolus, pneumothorax, aortic dissection, cardiac ischemia, aortic dissection, or other serious etiology. Given the extremely low risk of these diagnoses further testing and evaluation are not indicated at this time. The patient has been instructed to follow up with their primary care physician and to return immediately to an ED if the symptoms change or worsen in any way. Patient verbalized understanding. CONSULTS:  None    PROCEDURES:  None    FINAL IMPRESSION      1. Chest pain, unspecified type          DISPOSITION/PLAN   DISPOSITION        PATIENT REFERRED TO:  VIVIEN Soriano - CNP  [address]  [phone]    Schedule an appointment as soon as possible for a visit in 2 days  for re-evaluation of your symptoms    [facility]  [address].   [address] [place] [address] [phone]  Go to   for worsening of symptoms      DISCHARGE MEDICATIONS:  Discharge Medication List as of 11/6/2021  7:42 PM          (Please note that portions of this note were completed with a voice recognition program.  Efforts were made to edit the dictations but occasionally words are mis-transcribed.)    DESMOND Hutchinson PA-C  11/06/21 2034

## 2021-11-07 NOTE — ED NOTES
Patient provided with discharge instructions, prescriptions, and follow up information. Verbalized understanding. IV discontinued and dry dressing in place. A&OX3. Steady gait noted at discharge. Wheelchair declined by patient.         Keren Egan RN  11/06/21 2010

## 2021-11-08 ENCOUNTER — OFFICE VISIT (OUTPATIENT)
Dept: FAMILY MEDICINE CLINIC | Age: 36
End: 2021-11-08
Payer: COMMERCIAL

## 2021-11-08 ENCOUNTER — CARE COORDINATION (OUTPATIENT)
Dept: CASE MANAGEMENT | Age: 36
End: 2021-11-08

## 2021-11-08 VITALS
WEIGHT: 275 LBS | HEART RATE: 67 BPM | DIASTOLIC BLOOD PRESSURE: 88 MMHG | OXYGEN SATURATION: 98 % | TEMPERATURE: 98 F | SYSTOLIC BLOOD PRESSURE: 116 MMHG | BODY MASS INDEX: 45.07 KG/M2

## 2021-11-08 DIAGNOSIS — R79.89 ELEVATED TSH: ICD-10-CM

## 2021-11-08 DIAGNOSIS — R07.9 CHEST PAIN AT REST: Primary | ICD-10-CM

## 2021-11-08 DIAGNOSIS — R07.9 CHEST PAIN, UNSPECIFIED TYPE: ICD-10-CM

## 2021-11-08 LAB
EKG ATRIAL RATE: 65 BPM
EKG P AXIS: 20 DEGREES
EKG P-R INTERVAL: 154 MS
EKG Q-T INTERVAL: 404 MS
EKG QRS DURATION: 98 MS
EKG QTC CALCULATION (BAZETT): 420 MS
EKG R AXIS: -11 DEGREES
EKG T AXIS: 32 DEGREES
EKG VENTRICULAR RATE: 65 BPM

## 2021-11-08 PROCEDURE — 99214 OFFICE O/P EST MOD 30 MIN: CPT | Performed by: NURSE PRACTITIONER

## 2021-11-08 PROCEDURE — 1111F DSCHRG MED/CURRENT MED MERGE: CPT | Performed by: NURSE PRACTITIONER

## 2021-11-08 RX ORDER — ASPIRIN 81 MG/1
81 TABLET ORAL DAILY
Qty: 30 TABLET | Refills: 5 | Status: SHIPPED | OUTPATIENT
Start: 2021-11-08 | End: 2021-12-21 | Stop reason: SDUPTHER

## 2021-11-08 NOTE — CARE COORDINATION
Tejinder [facility] Transitions Initial Follow Up Call    Call within 2 business days of discharge: Yes    Patient: Patricia Cuenca Patient : 1985   MRN: <[id number]>  Reason for Admission: CHEST PAIN  Discharge Date: 21 RARS: Readmission Risk Score: 9.4      Last Discharge [facility]       Complaint Diagnosis Description Type Department Provider    21 Chest Pain Chest pain, unspecified type ED (DISCHARGE) [facility] ED Melia Coppola DO           Spoke with: 16[address] Mehran Romo Spoke to Angela. Pt states she continues to have intermittent chest pain. She was seen by her PCP today. Asprin 81mg ordered. 1111F medication reconciliation completed today at PCP's office. Appetite is good. Bowel and bladder WNL. Angela has no HHC or DME needs at this time. Pt. Has an appt for  Stress test on 21. Pt has not had the Covid Vaccine. Will continue to follow. Transitions of Care Initial Call    Was this an external facility discharge? No Discharge Facility: N/A    Challenges to be reviewed by the provider   Additional needs identified to be addressed with provider: No  none             Method of communication with provider : none      Advance Care Planning:   Does patient have an Advance Directive: reviewed and current. Was this a readmission? No  Patient stated reason for admission: CHEST PAIN  Patients top risk factors for readmission: medical condition-CHEST PAIN    Care Transition Nurse (CTN) contacted the patient by telephone to perform post hospital discharge assessment. Verified name and  with patient as identifiers. Provided introduction to self, and explanation of the CTN role. CTN reviewed discharge instructions, medical action plan and red flags with patient who verbalized understanding. Patient given an opportunity to ask questions and does not have any further questions or concerns at this time. Were discharge instructions available to patient? Yes.  Reviewed appropriate site of care based on symptoms and resources available to patient including: PCP, When to call 911 and [address]. The patient agrees to contact the PCP office for questions related to their healthcare. Medication reconciliation was performed with patient, who verbalizes understanding of administration of home medications. Advised obtaining a 90-day supply of all daily and as-needed medications. Covid Risk Education     Educated patient about risk for severe COVID-19 due to risk factors according to CDC guidelines. LPN CC reviewed discharge instructions, medical action plan and red flag symptoms with the patient who verbalized understanding. Discussed COVID vaccination status: Yes. Education provided on COVID-19 vaccination as appropriate. Discussed exposure protocols and quarantine with CDC Guidelines. Patient was given an opportunity to verbalize any questions and concerns and agrees to contact LPN CC or health care provider for questions related to their healthcare. Reviewed and educated patient on any new and changed medications related to discharge diagnosis. Was patient discharged with a pulse oximeter? No Discussed and confirmed pulse oximeter discharge instructions and when to notify provider or seek emergency care. LPN CC provided contact information. Plan for follow-up call in 5-7 days based on severity of symptoms and risk factors.   Plan for next call: symptom management-CHEST PAIN and follow up appointment-FOR STRESS TEST 11/11/21          Facility: [facility]    Non-face-to-face services provided:  Obtained and reviewed discharge summary and/or continuity of care documents  Reviewed and followed up on pending diagnostic tests and treatments-STRESS TEST ON 11/11/21    Care Transitions 24 Hour Call    Care Transitions Interventions         Follow Up  Future Appointments   Date Time Provider Maryuri Mcdowell   11/11/2021  8:45 AM SCHEDULE, KIANA DICKINSONURG STRESS RM 1 IKANA DICKINSON STR GARRET Cunningham   12/21/2021  4:00 PM VIVIEN Ambriz - AMANDA Briones, [address] [place] [address] Care Transitions Nurse   [phone]

## 2021-11-08 NOTE — LETTER
[facility]  [address] [place] [address]  Phone: [phone]  Fax: [address] [place] [address], VIVIEN - CNP        November 8, 2021     Patient: Pérez Anthony   YOB: 1985   Date of Visit: 11/8/2021       To Whom it May Concern:    Fritz Whatley was seen in my clinic on 11/8/2021. Please excuse the patient from work until 11/21/22. She may return to work on 11/22/21 without restrictions. If you have any questions or concerns, please don't hesitate to call.     Sincerely,       Electronically signed by VIVIEN Reynolds CNP on 11/23/2021 at 4:13 PM      VIVIEN Reynolds CNP   CK

## 2021-11-08 NOTE — PROGRESS NOTES
Post-Discharge Transitional Care Management Services or Hospital Follow Up      Cindi Martin   YOB: 1985    Date of Office Visit:  11/8/2021  Date of Hospital Admission: 11/6/21  Date of Hospital Discharge: 11/6/21  Risk of hospital readmission (high >=14%.  Medium >=10%) :Readmission Risk Score: 9.4      Care management risk score Rising risk (score 2-5) and Complex Care (Scores >=6): 0     Non face to face  following discharge, date last encounter closed (first attempt may have been earlier): *No documented post hospital discharge outreach found in the last 14 days    Call initiated 2 business days of discharge: *No response recorded in the last 14 days    Patient Active Problem List   Diagnosis    Depression    Arrhythmia    Neurogenic syncope    Migraine    Encounter to establish care    Vitamin D deficiency    Elevated fasting glucose    Hypokalemia    Anxiety    Bradycardia    Chest pain    Postoperative hypothyroidism    Adenomatous goiter, toxic or with hyperthyroidism    Benign essential hypertension    Fatigue    History of Demetria-en-Y gastric bypass    Iron deficiency anemia    Malignant tumor of thyroid gland ([facility] [address]    Morbid obesity with BMI of 40.0-44.9, adult (HCC)       Allergies   Allergen Reactions    Amoxicillin-Pot Clavulanate Hives     Other reaction(s): rash    Amoxil [Amoxicillin] Hives    Cefdinir Hives and Rash           Frovatriptan Hives           Metoclopramide Other (See Comments)     Other reaction(s): JITTERY/ELEVATED BP        Morphine Itching    Nsaids Other (See Comments)     Patient Gets Stomach Ulcers    Shrimp (Diagnostic) Hives    Shrimp Extract Allergy Skin Test Hives       Medications listed as ordered at the time of discharge from hospital  @DISCHARGEMEDSLIST(<NOROUTINE> error)@      Medications marked \"taking\" at this time  Outpatient Medications Marked as Taking for the 11/8/21 encounter (Office Visit) with Prudence Perez presents with    Follow-Up from Hospital     Chest pain       History of Present illness - Follow up of Hospital diagnosis(es): Patient presented with general fatigue and tired as well as chest pain  Cardiac workup was initially negative - however f/u with cardiology based on history was recommend as well as stress test. Patient also has noted elevated of TSH- for which she is t follow with Endocrinology regarding-     Inpatient course: Discharge summary reviewed- see chart. Interval history/Current status:  Stable at this time - continues off work til stress test - or seen by cardiology       Vitals:    11/08/21 1250   BP: 116/88   Pulse: 67   Temp: 98 °F (36.7 °C)   TempSrc: Tympanic   SpO2: 98%   Weight: 275 lb (124.7 kg)     Body mass index is 45.07 kg/m².    Wt Readings from Last 3 Encounters:   11/08/21 275 lb (124.7 kg)   11/06/21 255 lb (115.7 kg)   11/05/21 254 lb 4.8 oz (115.3 kg)     BP Readings from Last 3 Encounters:   11/08/21 116/88   11/06/21 (!) 144/91   11/05/21 119/76        Physical Exam:  General Appearance: alert and oriented to person, place and time, well developed and well- nourished, in no acute distress  Skin: warm and dry, no rash or erythema  Head: normocephalic and atraumatic  Eyes: pupils equal, round, and reactive to light, extraocular eye movements intact, conjunctivae normal  ENT: tympanic membrane, external ear and ear canal normal bilaterally, nose without deformity, nasal mucosa and turbinates normal without polyps  Neck: supple and non-tender without mass, no thyromegaly or thyroid nodules, no cervical lymphadenopathy  Pulmonary/Chest: clear to auscultation bilaterally- no wheezes, rales or rhonchi, normal air movement, no respiratory distress  Cardiovascular: normal rate, regular rhythm, normal S1 and S2, no murmurs, rubs, clicks, or gallops, distal pulses intact, no carotid bruits  Abdomen: soft, non-tender, non-distended, normal bowel sounds, no masses or organomegaly  Extremities: no cyanosis, clubbing or edema  Musculoskeletal: normal range of motion, no joint swelling, deformity or tenderness  Neurologic: reflexes normal and symmetric, no cranial nerve deficit, gait, coordination and speech normal    Assessment/Plan:  1. Chest pain at rest    - AFL - Jonah Salazar DO, Cardiology, [address] [facility] [address]; Future  - aspirin EC 81 MG EC tablet; Take 1 tablet by mouth daily  Dispense: 30 tablet; Refill: 5  - IA DISCHARGE MEDS RECONCILED W/ CURRENT OUTPATIENT MED LIST    2. Chest pain, unspecified type  monitored    3.  Elevated TSH  Medication increased and is to follow with endocrinology         Medical Decision Making: moderate complexity

## 2021-11-10 ENCOUNTER — TELEPHONE (OUTPATIENT)
Dept: FAMILY MEDICINE CLINIC | Age: 36
End: 2021-11-10

## 2021-11-10 DIAGNOSIS — R07.9 CHEST PAIN AT REST: ICD-10-CM

## 2021-11-10 DIAGNOSIS — R07.9 CHEST PAIN, UNSPECIFIED TYPE: Primary | ICD-10-CM

## 2021-11-10 NOTE — TELEPHONE ENCOUNTER
Zia Barroso from [facility] radiology requesting order change to nuclear med Vel Borges due to dx. Previously patient had chemical stress test in 2007. Please advise if order can be changed?     CB: [phone]

## 2021-11-11 ENCOUNTER — HOSPITAL ENCOUNTER (OUTPATIENT)
Dept: NUCLEAR MEDICINE | Age: 36
Discharge: HOME | End: 2021-11-13
Payer: COMMERCIAL

## 2021-11-11 ENCOUNTER — HOSPITAL ENCOUNTER (OUTPATIENT)
Dept: NON INVASIVE DIAGNOSTICS | Age: 36
Discharge: HOME | End: 2021-11-13
Payer: COMMERCIAL

## 2021-11-11 ENCOUNTER — HOSPITAL ENCOUNTER (OUTPATIENT)
Age: 36
Discharge: HOME | End: 2021-11-11
Payer: COMMERCIAL

## 2021-11-11 ENCOUNTER — HOSPITAL ENCOUNTER (OUTPATIENT)
Dept: ULTRASOUND IMAGING | Age: 36
Discharge: HOME | End: 2021-11-13
Payer: COMMERCIAL

## 2021-11-11 VITALS — BODY MASS INDEX: 44.2 KG/M2 | WEIGHT: 275 LBS | HEIGHT: 66 IN

## 2021-11-11 VITALS — HEART RATE: 71 BPM | SYSTOLIC BLOOD PRESSURE: 134 MMHG | DIASTOLIC BLOOD PRESSURE: 88 MMHG | RESPIRATION RATE: 16 BRPM

## 2021-11-11 DIAGNOSIS — E89.0 POSTSURGICAL HYPOTHYROIDISM: ICD-10-CM

## 2021-11-11 DIAGNOSIS — R07.9 CHEST PAIN, UNSPECIFIED TYPE: ICD-10-CM

## 2021-11-11 DIAGNOSIS — C73 MALIGNANT NEOPLASM OF THYROID GLAND (HCC): ICD-10-CM

## 2021-11-11 DIAGNOSIS — R07.9 CHEST PAIN AT REST: ICD-10-CM

## 2021-11-11 LAB
LV EF: 64 %
LVEF MODALITY: NORMAL
T3 FREE: 1.34 PG/ML (ref 2.02–4.43)
THYROXINE, FREE: 0.34 NG/DL (ref 0.93–1.7)
TSH SERPL DL<=0.05 MIU/L-ACNC: 38.61 MIU/L (ref 0.3–5)

## 2021-11-11 PROCEDURE — 6360000002 HC RX W HCPCS: Performed by: NURSE PRACTITIONER

## 2021-11-11 PROCEDURE — A9500 TC99M SESTAMIBI: HCPCS | Performed by: NURSE PRACTITIONER

## 2021-11-11 PROCEDURE — 84443 ASSAY THYROID STIM HORMONE: CPT

## 2021-11-11 PROCEDURE — 84439 ASSAY OF FREE THYROXINE: CPT

## 2021-11-11 PROCEDURE — 86800 THYROGLOBULIN ANTIBODY: CPT

## 2021-11-11 PROCEDURE — 84481 FREE ASSAY (FT-3): CPT

## 2021-11-11 PROCEDURE — 2580000003 HC RX 258: Performed by: NURSE PRACTITIONER

## 2021-11-11 PROCEDURE — 93017 CV STRESS TEST TRACING ONLY: CPT

## 2021-11-11 PROCEDURE — 36415 COLL VENOUS BLD VENIPUNCTURE: CPT

## 2021-11-11 PROCEDURE — 76536 US EXAM OF HEAD AND NECK: CPT

## 2021-11-11 PROCEDURE — 78452 HT MUSCLE IMAGE SPECT MULT: CPT

## 2021-11-11 PROCEDURE — 3430000000 HC RX DIAGNOSTIC RADIOPHARMACEUTICAL: Performed by: NURSE PRACTITIONER

## 2021-11-11 PROCEDURE — 84432 ASSAY OF THYROGLOBULIN: CPT

## 2021-11-11 RX ORDER — SODIUM CHLORIDE 0.9 % (FLUSH) 0.9 %
5-40 SYRINGE (ML) INJECTION PRN
Status: DISCONTINUED | OUTPATIENT
Start: 2021-11-11 | End: 2021-11-11 | Stop reason: ALTCHOICE

## 2021-11-11 RX ORDER — METOPROLOL TARTRATE 5 MG/5ML
5 INJECTION INTRAVENOUS EVERY 5 MIN PRN
Status: DISCONTINUED | OUTPATIENT
Start: 2021-11-11 | End: 2021-11-11 | Stop reason: ALTCHOICE

## 2021-11-11 RX ORDER — SODIUM CHLORIDE 0.9 % (FLUSH) 0.9 %
10 SYRINGE (ML) INJECTION PRN
Status: COMPLETED | OUTPATIENT
Start: 2021-11-11 | End: 2021-11-11

## 2021-11-11 RX ORDER — ATROPINE SULFATE 0.1 MG/ML
0.5 INJECTION INTRAVENOUS EVERY 5 MIN PRN
Status: DISCONTINUED | OUTPATIENT
Start: 2021-11-11 | End: 2021-11-11 | Stop reason: ALTCHOICE

## 2021-11-11 RX ORDER — NITROGLYCERIN 0.4 MG/1
0.4 TABLET SUBLINGUAL EVERY 5 MIN PRN
Status: DISCONTINUED | OUTPATIENT
Start: 2021-11-11 | End: 2021-11-11 | Stop reason: ALTCHOICE

## 2021-11-11 RX ORDER — ALBUTEROL SULFATE 90 UG/1
2 AEROSOL, METERED RESPIRATORY (INHALATION) PRN
Status: DISCONTINUED | OUTPATIENT
Start: 2021-11-11 | End: 2021-11-11 | Stop reason: ALTCHOICE

## 2021-11-11 RX ORDER — SODIUM CHLORIDE 9 MG/ML
500 INJECTION, SOLUTION INTRAVENOUS CONTINUOUS PRN
Status: DISCONTINUED | OUTPATIENT
Start: 2021-11-11 | End: 2021-11-11 | Stop reason: ALTCHOICE

## 2021-11-11 RX ORDER — AMINOPHYLLINE DIHYDRATE 25 MG/ML
50 INJECTION, SOLUTION INTRAVENOUS PRN
Status: DISCONTINUED | OUTPATIENT
Start: 2021-11-11 | End: 2021-11-11 | Stop reason: ALTCHOICE

## 2021-11-11 RX ADMIN — SODIUM CHLORIDE, PRESERVATIVE FREE 10 ML: 5 INJECTION INTRAVENOUS at 08:55

## 2021-11-11 RX ADMIN — REGADENOSON 0.4 MG: 0.08 INJECTION, SOLUTION INTRAVENOUS at 08:55

## 2021-11-11 RX ADMIN — TETRAKIS(2-METHOXYISOBUTYLISOCYANIDE)COPPER(I) TETRAFLUOROBORATE 12.78 MILLICURIE: 1 INJECTION, POWDER, LYOPHILIZED, FOR SOLUTION INTRAVENOUS at 07:28

## 2021-11-11 RX ADMIN — TETRAKIS(2-METHOXYISOBUTYLISOCYANIDE)COPPER(I) TETRAFLUOROBORATE 38.57 MILLICURIE: 1 INJECTION, POWDER, LYOPHILIZED, FOR SOLUTION INTRAVENOUS at 08:57

## 2021-11-11 RX ADMIN — SODIUM CHLORIDE, PRESERVATIVE FREE 10 ML: 5 INJECTION INTRAVENOUS at 07:28

## 2021-11-11 RX ADMIN — SODIUM CHLORIDE, PRESERVATIVE FREE 10 ML: 5 INJECTION INTRAVENOUS at 09:00

## 2021-11-11 NOTE — PROGRESS NOTES
Lexiscan stress test completed and reviewed by MADDIE Gimenez. Patient experienced dizziness, a mild HA, and 6/10 chest pressure. PO caffeine provided. Symptoms resolved in recovery. IV removed. VS returned to baseline, see flow sheets for documented VS throughout procedure.

## 2021-11-11 NOTE — PROCEDURES
[address]                [address] [address] [facility] [address]                              CARDIAC STRESS TEST    PATIENT NAME: Harmeet Byrd                  :        1985  MED REC NO:   [id number]                             ROOM:  ACCOUNT NO:   [de-identified]                           ADMIT DATE: 2021  PROVIDER:     Jose A Kendall    CARDIOVASCULAR DIAGNOSTIC DEPARTMENT    DATE OF STUDY:  2021    ORDERING PROVIDER:  MADDIE Solis    PRIMARY CARE PROVIDER:  MADDIE Solis    INTERPRETING PHYSICIAN:  Maulik Mayfield MD    TEST TYPE: LEXISCAN CARDIOLYTE STRESS TEST  INDICATION: CHEST PAIN    RESTING HEART RATE: 50 bpm  RESTING BLOOD PRESSURE: 128/87 mmHg  PEAK HEART RATE: 86 bpm  PEAK BLOOD PRESSURE: 134/88 mmHg    MEDICATION(S) GIVEN: 0.4 mg IV LEXISCAN. PO CAFFEINE. REASON FOR TERMINATION: MEDICATION INFUSION COMPLETE  SYMPTOMS: CHEST PRESSURE (6/10) AND DIZZINESS POST INJECTION. RESOLVED  IN RECOVERY. RESTING EKG: ABNORMAL. NORMAL SINUS RHYTHM WITH NON-SPECIFIC ST CHANGES. STRESS HEART RESPONSE: NORMAL RESPONSE.  BLOOD PRESSURE RESPONSE: APPROPRIATE. STRESS EKGs: NO CHANGES SEEN. CHEST DISCOMFORT: NO PAIN DURING STRESS. NO PAIN DURING RECOVERY. ISCHEMIC EKG CHANGES: NONE.    EKG IMPRESSION: NEGATIVE ELECTROCARDIOGRAPHICALLY LEXISCAN STRESS TEST. NUCLEAR SCAN TO FOLLOW.     Maulik Mayfield    D: 2021 12:25:52       T: 2021 12:27:04     CAROLE  Job#: [id number]     Doc#: Unknown    CC:    (Retain this field even if not dictated or not decipherable)

## 2021-11-11 NOTE — PROGRESS NOTES
Patient present for lexiscan stress test. Educated on procedure. All questions/concerns addressed. Allergies and medication reviewed. Patient prepped for procedure. Stress test will be supervised by MADDIE Woods.

## 2021-11-12 LAB — THYROGLOBULIN: <0.2 NG/ML (ref 0–63.4)

## 2021-11-13 ENCOUNTER — HOSPITAL ENCOUNTER (OUTPATIENT)
Age: 36
Setting detail: SPECIMEN
Discharge: HOME | End: 2021-11-13
Payer: COMMERCIAL

## 2021-11-13 PROCEDURE — 82530 CORTISOL FREE: CPT

## 2021-11-15 ENCOUNTER — CARE COORDINATION (OUTPATIENT)
Dept: CASE MANAGEMENT | Age: 36
End: 2021-11-15

## 2021-11-15 LAB — THYROGLOBULIN AB: 23 IU/ML (ref 0–40)

## 2021-11-15 NOTE — CARE COORDINATION
[facility] 45 Transitions Follow Up Call    11/15/2021    Patient: Rebekah Barroso  Patient : 1985   MRN: [id number]  Reason for Admission: Chest pain  Discharge Date: 21 RARS: Readmission Risk Score: 9.4         Spoke with: Rebekah Barroso    Was able to contact Jamaal Hewitt for transitional outreach. She stated that she was doing all right. She said that she still is having the chest pain, but it is not happening as much. She said that she had the stress test and it was negative. She said that her chest pain was because of a hormonal imbalance. She said that her TSH was better. She did say that her US of her thyroid did not come out so well. She said that they found a couple of nodals. She said that she will be seeing the endocrinologist tomorrow and he will probably have her have a biopsy of the nodals. She said that she had cancer of the thyroid before. She had no further questions or concerns. Care Transitions Follow Up Call    Needs to be reviewed by the provider   Additional needs identified to be addressed with provider: No  none             Method of communication with provider : none      Care Transition Nurse (CTN) contacted the patient by telephone to follow up after admission on 21. Verified name and  with patient as identifiers. Addressed changes since last contact: none  Discussed follow-up appointments. CTN reviewed discharge instructions, medical action plan and red flags with patient and discussed any barriers to care and/or understanding of plan of care after discharge. Discussed appropriate site of care based on symptoms and resources available to patient including: PCP, Specialist and When to call 911. The patient agrees to contact the PCP office for questions related to their healthcare.      Patients top risk factors for readmission: lack of knowledge about disease  Interventions to address risk factors: Obtained and reviewed discharge summary and/or continuity of care documents      Non-[facility] follow up appointment(s): 11/16 Dr James Mcdonald provided contact information for future needs. Plan for follow-up call in 7-10 days based on severity of symptoms and risk factors. Plan for next call: follow up            Care Transitions Subsequent and Final Call    Subsequent and Final Calls  Do you have any ongoing symptoms?: Yes  Onset of Patient-reported symptoms: In the past 7 days  Patient-reported symptoms: Chest Pain  Have your medications changed?: No  Do you have any questions related to your medications?: No  Do you currently have any active services?: No  Do you have any needs or concerns that I can assist you with?: No  Care Transitions Interventions  Other Interventions:            Follow Up  Future Appointments   Date Time Provider Maryuri Mcdowell   12/21/2021  4:00 PM VIVIEN Barriga - AMANDA Rojas RN

## 2021-11-17 ENCOUNTER — TELEPHONE (OUTPATIENT)
Dept: FAMILY MEDICINE CLINIC | Age: 36
End: 2021-11-17

## 2021-11-23 ENCOUNTER — CARE COORDINATION (OUTPATIENT)
Dept: CASE MANAGEMENT | Age: 36
End: 2021-11-23

## 2021-11-23 NOTE — CARE COORDINATION
[facility] 45 Transitions Follow Up Call    2021    Patient: Chris Franklin  Patient : 1985   MRN: <[id number]>  Reason for Admission: Chest pain   Discharge Date: 21 RARS: Readmission Risk Score: 9.4         Spoke with: Subsequent call. No answer. Left HIPPA compliant message to return call to this writer. Care Transitions Subsequent and Final Call    Subsequent and Final Calls  Care Transitions Interventions  Other Interventions:            Follow Up  Future Appointments   Date Time Provider Maryuri Mcdowell   2021  4:00 PM VIVIEN Gallo - AMANDA Cason, [address] [place] [address] Care Transitions Nurse   [phone]

## 2021-11-29 ENCOUNTER — TELEPHONE (OUTPATIENT)
Dept: FAMILY MEDICINE CLINIC | Age: 36
End: 2021-11-29

## 2021-11-29 LAB
CORTISOL (UR), FREE: <2.1 UG/D
CORTISOL URINE, FREE (/G CRT): <1 UG/L
CORTISOL, URINE RATIO CREATININE: <1.33 UG/G CRT
CORTISOL, URINE: NORMAL
CREATININE URINE /24 HR: 1591 MG/D (ref 700–1600)
CREATININE URINE /VOLUME: 75 MG/DL
HOURS COLLECTED: 24
URINE VOLUME: 1591

## 2021-11-30 ENCOUNTER — CARE COORDINATION (OUTPATIENT)
Dept: CASE MANAGEMENT | Age: 36
End: 2021-11-30

## 2021-11-30 NOTE — TELEPHONE ENCOUNTER
Patient is calling back about previous message on FMLA     Patient is contacting the office to inform that her FMLA needed further information. Writer corrected adding the symptoms of the cardiac concerns. Patient is asking if you can do intermittent time off due to the need for appt with the [facility] for her horomone imbalance.        Please advise.

## 2021-11-30 NOTE — CARE COORDINATION
[facility] 45 Transitions Follow Up Call    2021    Patient: Judi Her  Patient : 1985   MRN: <[id number]>  Reason for Admission: chest pain  Discharge Date: 21 RARS: Readmission Risk Score: 9.4         Spoke with: Subsequent call. Spoke to [place]. Pt. Denies chest pain. States she followed up with cardiology at [facility] and found out her issue is related to hormone imbalance. Pt. States she was seen by her PCP on 21. She had a stress test on 21. Pt. Has since returned to work. Next appt with PCP 21. No new issues or concerns. Final call. Care Transitions Follow Up Call    Needs to be reviewed by the provider   Additional needs identified to be addressed with provider: No  none             Method of communication with provider : none      Care Transition Nurse (CTN) contacted the patient by telephone to follow up after admission on 21. Verified name and  with patient as identifiers. Addressed changes since last contact: none  Discussed follow-up appointments. If no appointment was previously scheduled, appointment scheduling offered: No.   Is follow up appointment scheduled within 7 days of discharge? Yes. Advance Care Planning:   Does patient have an Advance Directive: reviewed and current. CTN reviewed discharge instructions, medical action plan and red flags with patient and discussed any barriers to care and/or understanding of plan of care after discharge. Discussed appropriate site of care based on symptoms and resources available to patient including: PCP, Specialist, When to call 911 and [address]. The patient agrees to contact the PCP office for questions related to their healthcare.      Patients top risk factors for readmission: medical condition-chest pain  Interventions to address risk factors: Obtained and reviewed discharge summary and/or continuity of care documents      Non-[facility] follow up appointment(s): [facility]    CTN provided contact information for future needs. No further follow-up call indicated based on severity of symptoms and risk factors. Plan for next call: n/a          Care Transitions Subsequent and Final Call    Subsequent and Final Calls  Do you have any ongoing symptoms?: No  Have your medications changed?: No  Do you have any questions related to your medications?: No  Do you currently have any active services?: No  Do you have any needs or concerns that I can assist you with?: No  Identified Barriers: None  Care Transitions Interventions  No Identified Needs  Other Interventions:            Follow Up  Future Appointments   Date Time Provider Maryuri Mcdowell   12/21/2021  4:00 PM VIVIEN Gee - AMANDA [place], [facility] Care Transitions Nurse   [phone]

## 2021-11-30 NOTE — TELEPHONE ENCOUNTER
----- Message from Nathanael Yi sent at 11/29/2021  3:44 PM EST -----  Subject: Message to Provider    QUESTIONS  Information for Provider? patient would like a phone call from the office. ---------------------------------------------------------------------------  --------------  Berta BROWN  What is the best way for the office to contact you? OK to leave message on   voicemail  Preferred Call Back Phone Number? [phone]  ---------------------------------------------------------------------------  --------------  SCRIPT ANSWERS  Relationship to Patient?  Self

## 2021-12-14 ENCOUNTER — HOSPITAL ENCOUNTER (OUTPATIENT)
Age: 36
Discharge: HOME | End: 2021-12-14
Payer: COMMERCIAL

## 2021-12-14 LAB
T3 FREE: 4.66 PG/ML (ref 2.02–4.43)
THYROXINE, FREE: 1.24 NG/DL (ref 0.93–1.7)
TSH SERPL DL<=0.05 MIU/L-ACNC: 0.51 MIU/L (ref 0.3–5)

## 2021-12-14 PROCEDURE — 84481 FREE ASSAY (FT-3): CPT

## 2021-12-14 PROCEDURE — 36415 COLL VENOUS BLD VENIPUNCTURE: CPT

## 2021-12-14 PROCEDURE — 84443 ASSAY THYROID STIM HORMONE: CPT

## 2021-12-14 PROCEDURE — 84439 ASSAY OF FREE THYROXINE: CPT

## 2021-12-21 ENCOUNTER — OFFICE VISIT (OUTPATIENT)
Dept: FAMILY MEDICINE CLINIC | Age: 36
End: 2021-12-21
Payer: COMMERCIAL

## 2021-12-21 VITALS
HEART RATE: 84 BPM | WEIGHT: 287 LBS | SYSTOLIC BLOOD PRESSURE: 120 MMHG | BODY MASS INDEX: 47.03 KG/M2 | DIASTOLIC BLOOD PRESSURE: 80 MMHG | OXYGEN SATURATION: 98 % | TEMPERATURE: 98 F

## 2021-12-21 DIAGNOSIS — N64.52 NIPPLE DISCHARGE: ICD-10-CM

## 2021-12-21 DIAGNOSIS — R07.9 CHEST PAIN AT REST: Primary | ICD-10-CM

## 2021-12-21 DIAGNOSIS — R11.0 NAUSEA: ICD-10-CM

## 2021-12-21 PROCEDURE — 99214 OFFICE O/P EST MOD 30 MIN: CPT | Performed by: NURSE PRACTITIONER

## 2021-12-21 RX ORDER — ONDANSETRON 4 MG/1
4 TABLET, ORALLY DISINTEGRATING ORAL EVERY 8 HOURS PRN
Qty: 30 TABLET | Refills: 1 | Status: SHIPPED | OUTPATIENT
Start: 2021-12-21 | End: 2022-03-08

## 2021-12-21 RX ORDER — ASPIRIN 81 MG/1
81 TABLET ORAL DAILY
Qty: 30 TABLET | Refills: 5 | Status: SHIPPED | OUTPATIENT
Start: 2021-12-21 | End: 2021-12-21 | Stop reason: ALTCHOICE

## 2021-12-21 NOTE — PROGRESS NOTES
[facility]   [address]   [phone]    2021     CHIEF COMPLAINT:     Ada Orellana (:  1985) is a 43 y.o. female, here for evaluation of the following chief complaint(s):   Other (Recheck Chest pain)      REVIEWED INFORMATION      Allergies   Allergen Reactions    Amoxicillin-Pot Clavulanate Hives     Other reaction(s): rash    Amoxil [Amoxicillin] Hives    Cefdinir Hives and Rash           Frovatriptan Hives           Metoclopramide Other (See Comments)     Other reaction(s): JITTERY/ELEVATED BP        Morphine Itching    Nsaids Other (See Comments)     Patient Gets Stomach Ulcers    Shrimp (Diagnostic) Hives    Shrimp Extract Allergy Skin Test Hives       Current Outpatient Medications   Medication Sig Dispense Refill    ondansetron (ZOFRAN-ODT) 4 MG disintegrating tablet Take 1 tablet by mouth every 8 hours as needed for Nausea or Vomiting 30 tablet 1    levothyroxine (SYNTHROID) 100 MCG tablet Take 1 tablet by mouth daily 90 tablet 0    Multiple Vitamins-Minerals (THERAPEUTIC MULTIVITAMIN-MINERALS) tablet Take 1 tablet by mouth daily      FLUoxetine (PROZAC) 20 MG capsule TAKE 2 CAPSULES DAILY (Patient taking differently: 20 mg nightly ) 180 capsule 1    aMILoride (MIDAMOR) 5 MG tablet Take 1 tablet by mouth daily 30 tablet 5    azelastine (ASTELIN) 0.1 % nasal spray 2 sprays by Nasal route 2 times daily      butalbital-acetaminophen-caffeine (FIORICET, ESGIC) -40 MG per tablet Take by mouth daily as needed      naratriptan (AMERGE) 2.5 MG tablet Take by mouth daily as needed      UBRELVY 50 MG TABS Take 100 mg by mouth daily as needed for Migraine       thyroid (ARMOUR) 90 MG tablet Take 220 mg by mouth daily       potassium chloride (MICRO-K) 10 MEQ extended release capsule Take 1 capsule by mouth 3 times daily for 30 doses 90 capsule 1    cetirizine (ZYRTEC) 10 MG tablet Take by mouth nightly       SUMAtriptan Succinate 6 MG/0.5ML SOAJ Inject as directed as needed      Armodafinil (NUVIGIL) 250 MG TABS Take by mouth daily.  pantoprazole (PROTONIX) 40 MG tablet Take 40 mg by mouth nightly       Vitamin D (CHOLECALCIFEROL) 1000 UNITS CAPS capsule Take 4,000 Units by mouth 2 times daily      Magnesium Oxide 500 MG TABS Take 500 mg by mouth 2 times daily       No current facility-administered medications for this visit. Patient Care Team:  VIVIEN Boss CNP as PCP - General (Nurse Practitioner)  VIVIEN Boss CNP as PCP - REHABILITATION [facility] Empaneled Provider  Priyanka Hughes MD as Consulting Physician (Endocrinology)  Marilee Liao MD as Consulting Physician (Pulmonology)  Desiree Santos MD as Consulting Physician (Neurology)  Ronaldo Rooney MD (Bariatric Surgery)  Jr Hansen MD (Obstetrics & Gynecology)    REVIEW OF SYSTEMS:     Review of Systems   Constitutional: Negative for activity change, fatigue and unexpected weight change. HENT: Negative for congestion, ear pain, hearing loss, rhinorrhea and sore throat. Respiratory: Negative for cough and shortness of breath. Cardiovascular: Negative for chest pain, palpitations and leg swelling. Gastrointestinal: Positive for nausea (intermittent ). Negative for constipation and diarrhea. Endocrine:        Noted nipple discharge - reports has been there a year - she working with endocrine regarding   Musculoskeletal: Negative for arthralgias and gait problem. Neurological: Negative for dizziness, weakness and headaches. Psychiatric/Behavioral: Negative for confusion. The patient is not nervous/anxious.         HISTORY OF PRESENT ILLNESS     Patient reports overall improved with chest pain - she is back to work  States that she has some intermittent nausea that she has used the zofran for request a refill -     She is also continuing to work with endocrinology and specialist at [facility] regarding her overall all     PHYSICAL EXAM:     BP 120/80   Pulse 84   Temp 98 °F (36.7 °C) (Tympanic)   Wt 287 lb (130.2 kg)   LMP 12/18/2021   SpO2 98%   BMI 47.03 kg/m²      Physical Exam  Constitutional:       Appearance: Normal appearance. She is well-developed. She is not ill-appearing. Eyes:      General:         Right eye: No discharge. Left eye: No discharge. Extraocular Movements: Extraocular movements intact. Conjunctiva/sclera: Conjunctivae normal.   Neck:      Thyroid: No thyroid mass. Vascular: No JVD. Pulmonary:      Effort: Pulmonary effort is normal. No respiratory distress. Musculoskeletal:      Right shoulder: No deformity. Normal range of motion. Left shoulder: No deformity. Normal range of motion. Cervical back: Normal range of motion. Skin:     General: Skin is moist.      Coloration: Skin is not cyanotic or jaundiced. Findings: No rash. Neurological:      General: No focal deficit present. Mental Status: She is alert and oriented to person, place, and time. Gait: Gait is intact. Psychiatric:         Attention and Perception: Attention normal. She does not perceive auditory or visual hallucinations. Mood and Affect: Mood normal.         Speech: Speech normal.          PROCEDURE/ IN OFFICE TESTING/ LAB REVIEW     No in office testing or procedures completed during today's office visit.      Results for orders placed or performed during the hospital encounter of 12/28/21 (from the past 168 hour(s))   Aldosterone    Collection Time: 12/28/21  2:16 PM   Result Value Ref Range    Aldosterone 12.1 ng/dL    Aldosterone Comment SITTING    ACTH    Collection Time: 12/28/21  2:16 PM   Result Value Ref Range    ACTH 15 6 - 58 pg/mL   Cortisol Total    Collection Time: 12/28/21  2:16 PM   Result Value Ref Range    Cortisol 6.3 2.7 - 18.4 ug/dL    Cortisol Collection Info PM    Comprehensive Metabolic Panel    Collection Time: 12/28/21  2:16 PM   Result Value Ref Range    Glucose 108 (H) 70 - 99 mg/dL    BUN 5 (L) 6 - 20 mg/dL    CREATININE 0.71 0.50 - 0.90 mg/dL    Bun/Cre Ratio NOT REPORTED 9 - 20    Calcium 8.2 (L) 8.6 - 10.4 mg/dL    Sodium 140 135 - 144 mmol/L    Potassium 3.4 (L) 3.7 - 5.3 mmol/L    Chloride 104 98 - 107 mmol/L    CO2 24 20 - 31 mmol/L    Anion Gap 12 9 - 17 mmol/L    Alkaline Phosphatase 87 35 - 104 U/L    ALT <5 (L) 5 - 33 U/L    AST 14 <32 U/L    Total Bilirubin 0.24 (L) 0.3 - 1.2 mg/dL    Total Protein 6.5 6.4 - 8.3 g/dL    Albumin 4.0 3.5 - 5.2 g/dL    Albumin/Globulin Ratio 1.6 1.0 - 2.5    GFR Non-African American >60 >60 mL/min    GFR African American >60 >60 mL/min    GFR Comment          GFR Staging NOT REPORTED    Phosphorus    Collection Time: 12/28/21  2:16 PM   Result Value Ref Range    Phosphorus 3.2 2.6 - 4.5 mg/dL        ASSESSMENT/PLAN/ FOLLOWUP:     PLEASE NOTE THAT ANY DISCONTINUATION OF MEDICATIONS OR MEDICAL SUPPLIES REFLECTED IN TODAY'S VISIT SUMMARY  MAY NOT HAVE COMPLETED AS A CHANGE IN YOUR PLAN OF CARE. THESE CHANGES MAY HAVE ONLY BEEN DONE SO IN ORDER TO CLEAN UP LIST FROM DUPLICATIONS OR MISCELLANEOUS SUPPLIES ONLY NEEDED PERIODIC REORDERS. DO NOT DISCONTINUE MEDICATIONS LISTED UNLESS SPECIFICALLY DISCUSSED IN YOUR APPOINTMENT WITH PROVIDER OR SPECIALIST, IF YOU HAVE AN QUESTIONS, PLEASE CONTACT YOUR PROVIDER FOR CLARIFICATION IF NOT ADDRESSED IN YOUR PLAN OF CARE. 1. Chest pain at rest  resolved  2. Nausea  -     ondansetron (ZOFRAN-ODT) 4 MG disintegrating tablet; Take 1 tablet by mouth every 8 hours as needed for Nausea or Vomiting, Disp-30 tablet, R-1Normal  3. Nipple discharge  -     Prolactin; Future      No follow-ups on file. COMMUNICATION:       On this date 12/21/2021 I have spent 30 minutes reviewing previous notes, test results and face to face with the patient discussing the diagnosis and importance of compliance with the treatment plan as well as documenting on the day of the visit.       The best way to find yourself is to lose yourself in the service of others - [address] [place] [address]. [address] [facility]   [email or web address]  Office: [phone]     An electronic signature was used to authenticate this note.   Signed by Eulene Merlin, APRN - CNP, APRN-CNP on 12/31/2021 at 12:46 PM

## 2021-12-28 ENCOUNTER — HOSPITAL ENCOUNTER (OUTPATIENT)
Age: 36
Discharge: HOME | End: 2021-12-28
Payer: COMMERCIAL

## 2021-12-28 LAB
ALBUMIN SERPL-MCNC: 4 G/DL (ref 3.5–5.2)
ALBUMIN/GLOBULIN RATIO: 1.6 (ref 1–2.5)
ALP BLD-CCNC: 87 U/L (ref 35–104)
ALT SERPL-CCNC: <5 U/L (ref 5–33)
ANION GAP SERPL CALCULATED.3IONS-SCNC: 12 MMOL/L (ref 9–17)
AST SERPL-CCNC: 14 U/L
BILIRUB SERPL-MCNC: 0.24 MG/DL (ref 0.3–1.2)
BUN BLDV-MCNC: 5 MG/DL (ref 6–20)
BUN/CREAT BLD: ABNORMAL (ref 9–20)
CALCIUM SERPL-MCNC: 8.2 MG/DL (ref 8.6–10.4)
CHLORIDE BLD-SCNC: 104 MMOL/L (ref 98–107)
CO2: 24 MMOL/L (ref 20–31)
CREAT SERPL-MCNC: 0.71 MG/DL (ref 0.5–0.9)
GFR AFRICAN AMERICAN: >60 ML/MIN
GFR NON-AFRICAN AMERICAN: >60 ML/MIN
GFR SERPL CREATININE-BSD FRML MDRD: ABNORMAL ML/MIN/{1.73_M2}
GFR SERPL CREATININE-BSD FRML MDRD: ABNORMAL ML/MIN/{1.73_M2}
GLUCOSE BLD-MCNC: 108 MG/DL (ref 70–99)
PHOSPHORUS: 3.2 MG/DL (ref 2.6–4.5)
POTASSIUM SERPL-SCNC: 3.4 MMOL/L (ref 3.7–5.3)
SODIUM BLD-SCNC: 140 MMOL/L (ref 135–144)
TOTAL PROTEIN: 6.5 G/DL (ref 6.4–8.3)

## 2021-12-28 PROCEDURE — 82024 ASSAY OF ACTH: CPT

## 2021-12-28 PROCEDURE — 36415 COLL VENOUS BLD VENIPUNCTURE: CPT

## 2021-12-28 PROCEDURE — 84100 ASSAY OF PHOSPHORUS: CPT

## 2021-12-28 PROCEDURE — 82088 ASSAY OF ALDOSTERONE: CPT

## 2021-12-28 PROCEDURE — 82533 TOTAL CORTISOL: CPT

## 2021-12-28 PROCEDURE — 80053 COMPREHEN METABOLIC PANEL: CPT

## 2021-12-29 LAB
ADRENOCORTICOTROPIC HORMONE: 15 PG/ML (ref 6–58)
CORTISOL COLLECTION INFO: NORMAL
CORTISOL: 6.3 UG/DL (ref 2.7–18.4)

## 2021-12-31 LAB
ALDOSTERONE COMMENT: NORMAL
ALDOSTERONE: 12.1 NG/DL

## 2022-01-03 ENCOUNTER — HOSPITAL ENCOUNTER (OUTPATIENT)
Age: 37
Discharge: HOME | End: 2022-01-03
Payer: COMMERCIAL

## 2022-01-03 LAB
-: NORMAL
ADRENOCORTICOTROPIC HORMONE: 11 PG/ML (ref 6–58)
ALBUMIN SERPL-MCNC: 4.2 G/DL (ref 3.5–5.2)
ALBUMIN/GLOBULIN RATIO: 1.6 (ref 1–2.5)
ALP BLD-CCNC: 93 U/L (ref 35–104)
ALT SERPL-CCNC: 13 U/L (ref 5–33)
AMORPHOUS: NORMAL
ANION GAP SERPL CALCULATED.3IONS-SCNC: 8 MMOL/L (ref 9–17)
AST SERPL-CCNC: 20 U/L
BACTERIA: NORMAL
BILIRUB SERPL-MCNC: 0.22 MG/DL (ref 0.3–1.2)
BILIRUBIN URINE: NEGATIVE
BUN BLDV-MCNC: 6 MG/DL (ref 6–20)
BUN/CREAT BLD: ABNORMAL (ref 9–20)
CALCIUM SERPL-MCNC: 8.7 MG/DL (ref 8.6–10.4)
CASTS UA: NORMAL /LPF (ref 0–8)
CHLORIDE BLD-SCNC: 101 MMOL/L (ref 98–107)
CO2: 27 MMOL/L (ref 20–31)
COLOR: YELLOW
COMMENT UA: ABNORMAL
CORTISOL COLLECTION INFO: NORMAL
CORTISOL: 3.6 UG/DL (ref 2.7–18.4)
CREAT SERPL-MCNC: 0.72 MG/DL (ref 0.5–0.9)
CRYSTALS, UA: NORMAL /HPF
EPITHELIAL CELLS UA: NORMAL /HPF (ref 0–5)
GFR AFRICAN AMERICAN: >60 ML/MIN
GFR NON-AFRICAN AMERICAN: >60 ML/MIN
GFR SERPL CREATININE-BSD FRML MDRD: ABNORMAL ML/MIN/{1.73_M2}
GFR SERPL CREATININE-BSD FRML MDRD: ABNORMAL ML/MIN/{1.73_M2}
GLUCOSE BLD-MCNC: 80 MG/DL (ref 70–99)
GLUCOSE URINE: NEGATIVE
HOURS COLLECTED: 24 H
KETONES, URINE: NEGATIVE
LEUKOCYTE ESTERASE, URINE: NEGATIVE
MUCUS: NORMAL
NITRITE, URINE: NEGATIVE
OTHER OBSERVATIONS UA: NORMAL
PH UA: 6.5 (ref 5–8)
PHOSPHORUS: 3.8 MG/DL (ref 2.6–4.5)
POTASSIUM 24 HOUR URINE: 16 MMOL/24 H (ref 25–125)
POTASSIUM SERPL-SCNC: 3.6 MMOL/L (ref 3.7–5.3)
POTASSIUM TIMED UR: ABNORMAL MMOL/X H
POTASSIUM, UR: 6.2 MMOL/L
PROTEIN UA: NEGATIVE
RBC UA: NORMAL /HPF (ref 0–4)
RENAL EPITHELIAL, UA: NORMAL /HPF
SODIUM BLD-SCNC: 136 MMOL/L (ref 135–144)
SPECIFIC GRAVITY UA: 1.01 (ref 1–1.03)
TOTAL PROTEIN: 6.9 G/DL (ref 6.4–8.3)
TRICHOMONAS: NORMAL
TURBIDITY: CLEAR
URINE HGB: ABNORMAL
UROBILINOGEN, URINE: NORMAL
VOLUME: 2563 ML
WBC UA: NORMAL /HPF (ref 0–5)
YEAST: NORMAL

## 2022-01-03 PROCEDURE — 80053 COMPREHEN METABOLIC PANEL: CPT

## 2022-01-03 PROCEDURE — 36415 COLL VENOUS BLD VENIPUNCTURE: CPT

## 2022-01-03 PROCEDURE — 82530 CORTISOL FREE: CPT

## 2022-01-03 PROCEDURE — 84100 ASSAY OF PHOSPHORUS: CPT

## 2022-01-03 PROCEDURE — 82024 ASSAY OF ACTH: CPT

## 2022-01-03 PROCEDURE — 82088 ASSAY OF ALDOSTERONE: CPT

## 2022-01-03 PROCEDURE — 82533 TOTAL CORTISOL: CPT

## 2022-01-03 PROCEDURE — 84133 ASSAY OF URINE POTASSIUM: CPT

## 2022-01-03 PROCEDURE — 82570 ASSAY OF URINE CREATININE: CPT

## 2022-01-03 PROCEDURE — 82575 CREATININE CLEARANCE TEST: CPT

## 2022-01-03 PROCEDURE — 81001 URINALYSIS AUTO W/SCOPE: CPT

## 2022-01-05 LAB
ALDOSTERONE COMMENT: NORMAL
ALDOSTERONE: 20.4 NG/DL

## 2022-01-06 LAB
CORTISOL (UR), FREE: 8.6 UG/D
CORTISOL URINE, FREE (/G CRT): 3.35 UG/L
CORTISOL, URINE RATIO CREATININE: 7.28 UG/G CRT
CORTISOL, URINE: NORMAL
CREATININE URINE /24 HR: 1179 MG/D (ref 700–1600)
CREATININE URINE /VOLUME: 46 MG/DL
HOURS COLLECTED: 24
URINE VOLUME: 2563

## 2022-01-07 LAB
CREAT SERPL-MCNC: 0.72 MG/DL (ref 0.5–0.9)
CREATININE CLEARANCE: 88.8 ML/MIN/BSA (ref 71–151)
CREATININE TIMED UR: NORMAL MG/ X H
CREATININE URINE: 46.7 MG/DL (ref 28–217)
CREATININE URINE: 46.9 MG/DL
CREATININE, 24H UR: 1202 MG/24 H (ref 740–1570)
HOURS COLLECTED: 24 H
LENGTH OF COLLECTION: 24 H
PATIENT HEIGHT: 165 CM
PATIENT WEIGHT: 123 KG
VOLUME: 2563 ML
VOLUME: 2563 ML

## 2022-01-10 ENCOUNTER — TELEMEDICINE (OUTPATIENT)
Dept: FAMILY MEDICINE CLINIC | Age: 37
End: 2022-01-10
Payer: COMMERCIAL

## 2022-01-10 DIAGNOSIS — U07.1 COVID: Primary | ICD-10-CM

## 2022-01-10 PROCEDURE — 99213 OFFICE O/P EST LOW 20 MIN: CPT | Performed by: STUDENT IN AN ORGANIZED HEALTH CARE EDUCATION/TRAINING PROGRAM

## 2022-01-10 RX ORDER — LANSOPRAZOLE 30 MG/1
CAPSULE, DELAYED RELEASE ORAL
COMMUNITY
Start: 2021-12-09

## 2022-01-10 NOTE — PROGRESS NOTES
Liliane Jc (:  1985) is a 43 y.o. female,Established patient, here for evaluation of the following chief complaint(s): Concern For COVID-19 (letter to return to work )         ASSESSMENT/PLAN:  1. COVID    Had COVID-positive test last Wednesday  Symptoms began on Tuesday  Never vaccinated  Has concerned because she previously had adverse reaction to flu shot related to thiomersal?        No follow-ups on file. SUBJECTIVE/OBJECTIVE:  HPI: 33-year-old female presents for return to work note  She tested positive for COVID last Wednesday  She is currently asymptomatic with the exception of a little bit of postnasal drip  She is afebrile has no cough and is ready to return to work  Per most recent CDC guidelines she would be okay to return to work I would recommend wearing a mask for at least 3-4 more days    Review of Systems: Pertinent positives and negatives included in HPI 14 point ROS otherwise negative      Patient-Reported Vitals 1/10/2022   Patient-Reported Weight 290   Patient-Reported Height 5'5\"   Patient-Reported Systolic 231   Patient-Reported Diastolic 75   Patient-Reported Pulse 59   Patient-Reported Temperature 97.6   Patient-Reported SpO2 100               Kandance L Miller, was evaluated through a synchronous (real-time) audio-video encounter. The patient (or guardian if applicable) is aware that this is a billable service. Verbal consent to proceed has been obtained within the past 12 months. The visit was conducted pursuant to the emergency declaration under the [facility], [address] [place] [address] authority and the [employer] General Act. Patient identification was verified, and a caregiver was present when appropriate. The patient was located in a state where the provider was credentialed to provide care. An electronic signature was used to authenticate this note.     --Radha Thomas MD

## 2022-02-23 ENCOUNTER — HOSPITAL ENCOUNTER (OUTPATIENT)
Age: 37
Discharge: HOME | End: 2022-02-23
Payer: COMMERCIAL

## 2022-02-23 DIAGNOSIS — N64.52 NIPPLE DISCHARGE: ICD-10-CM

## 2022-02-23 LAB
ALBUMIN SERPL-MCNC: 4.2 G/DL (ref 3.5–5.2)
ANION GAP SERPL CALCULATED.3IONS-SCNC: 12 MMOL/L (ref 9–17)
BUN BLDV-MCNC: 8 MG/DL (ref 6–20)
CALCIUM SERPL-MCNC: 8.9 MG/DL (ref 8.6–10.4)
CALCIUM SERPL-MCNC: 8.9 MG/DL (ref 8.6–10.4)
CHLORIDE BLD-SCNC: 103 MMOL/L (ref 98–107)
CO2: 23 MMOL/L (ref 20–31)
CREAT SERPL-MCNC: 0.65 MG/DL (ref 0.5–0.9)
GFR AFRICAN AMERICAN: >60 ML/MIN
GFR NON-AFRICAN AMERICAN: >60 ML/MIN
GFR SERPL CREATININE-BSD FRML MDRD: ABNORMAL ML/MIN/{1.73_M2}
GLUCOSE BLD-MCNC: 72 MG/DL (ref 70–99)
MAGNESIUM: 1.9 MG/DL (ref 1.6–2.6)
PHOSPHORUS: 3 MG/DL (ref 2.6–4.5)
POTASSIUM SERPL-SCNC: 3.4 MMOL/L (ref 3.7–5.3)
POTASSIUM SERPL-SCNC: 3.4 MMOL/L (ref 3.7–5.3)
PROLACTIN: 8.65 UG/L (ref 4.79–23.3)
SODIUM BLD-SCNC: 138 MMOL/L (ref 135–144)
T3 FREE: 6.17 PG/ML (ref 2.02–4.43)
THYROXINE, FREE: 1.37 NG/DL (ref 0.93–1.7)
TSH SERPL DL<=0.05 MIU/L-ACNC: 0.04 MIU/L (ref 0.3–5)

## 2022-02-23 PROCEDURE — 84146 ASSAY OF PROLACTIN: CPT

## 2022-02-23 PROCEDURE — 84132 ASSAY OF SERUM POTASSIUM: CPT

## 2022-02-23 PROCEDURE — 80069 RENAL FUNCTION PANEL: CPT

## 2022-02-23 PROCEDURE — 86800 THYROGLOBULIN ANTIBODY: CPT

## 2022-02-23 PROCEDURE — 36415 COLL VENOUS BLD VENIPUNCTURE: CPT

## 2022-02-23 PROCEDURE — 84439 ASSAY OF FREE THYROXINE: CPT

## 2022-02-23 PROCEDURE — 84481 FREE ASSAY (FT-3): CPT

## 2022-02-23 PROCEDURE — 83735 ASSAY OF MAGNESIUM: CPT

## 2022-02-23 PROCEDURE — 84443 ASSAY THYROID STIM HORMONE: CPT

## 2022-02-23 PROCEDURE — 82310 ASSAY OF CALCIUM: CPT

## 2022-02-23 PROCEDURE — 84432 ASSAY OF THYROGLOBULIN: CPT

## 2022-02-24 LAB
THYROGLOBULIN AB: 34 IU/ML (ref 0–40)
THYROGLOBULIN: <0.2 NG/ML (ref 0–63.4)

## 2022-03-06 DIAGNOSIS — R11.0 NAUSEA: ICD-10-CM

## 2022-03-07 NOTE — TELEPHONE ENCOUNTER
Benign essential hypertension     Fatigue     History of Demetria-en-Y gastric bypass     Iron deficiency anemia     Malignant tumor of thyroid gland (HCC)     Morbid obesity with BMI of 40.0-44.9, adult ([facility] [address]

## 2022-03-08 RX ORDER — ONDANSETRON 4 MG/1
TABLET, ORALLY DISINTEGRATING ORAL
Qty: 30 TABLET | Refills: 1 | Status: SHIPPED | OUTPATIENT
Start: 2022-03-08 | End: 2022-03-29 | Stop reason: SDUPTHER

## 2022-03-16 ENCOUNTER — HOSPITAL ENCOUNTER (OUTPATIENT)
Age: 37
Discharge: HOME | End: 2022-03-16
Payer: COMMERCIAL

## 2022-03-16 ENCOUNTER — HOSPITAL ENCOUNTER (OUTPATIENT)
Age: 37
End: 2022-03-16

## 2022-03-16 LAB — POTASSIUM SERPL-SCNC: 3.6 MMOL/L (ref 3.7–5.3)

## 2022-03-16 PROCEDURE — 36415 COLL VENOUS BLD VENIPUNCTURE: CPT

## 2022-03-16 PROCEDURE — 84132 ASSAY OF SERUM POTASSIUM: CPT

## 2022-03-29 ENCOUNTER — OFFICE VISIT (OUTPATIENT)
Dept: FAMILY MEDICINE CLINIC | Age: 37
End: 2022-03-29
Payer: COMMERCIAL

## 2022-03-29 VITALS
HEART RATE: 84 BPM | OXYGEN SATURATION: 98 % | BODY MASS INDEX: 47.98 KG/M2 | SYSTOLIC BLOOD PRESSURE: 120 MMHG | HEIGHT: 65 IN | WEIGHT: 288 LBS | DIASTOLIC BLOOD PRESSURE: 80 MMHG | TEMPERATURE: 98.5 F

## 2022-03-29 DIAGNOSIS — Z01.89 ROUTINE LAB DRAW: ICD-10-CM

## 2022-03-29 DIAGNOSIS — Z00.00 ANNUAL PHYSICAL EXAM: Primary | ICD-10-CM

## 2022-03-29 DIAGNOSIS — R11.0 NAUSEA: ICD-10-CM

## 2022-03-29 PROCEDURE — 99395 PREV VISIT EST AGE 18-39: CPT | Performed by: NURSE PRACTITIONER

## 2022-03-29 RX ORDER — ATOGEPANT 30 MG/1
60 TABLET ORAL
COMMUNITY

## 2022-03-29 RX ORDER — ONDANSETRON 4 MG/1
TABLET, ORALLY DISINTEGRATING ORAL
Qty: 30 TABLET | Refills: 1 | Status: SHIPPED | OUTPATIENT
Start: 2022-03-29 | End: 2022-05-18

## 2022-03-29 ASSESSMENT — PATIENT HEALTH QUESTIONNAIRE - PHQ9
SUM OF ALL RESPONSES TO PHQ QUESTIONS 1-9: 0
SUM OF ALL RESPONSES TO PHQ QUESTIONS 1-9: 0
1. LITTLE INTEREST OR PLEASURE IN DOING THINGS: 0
2. FEELING DOWN, DEPRESSED OR HOPELESS: 0
SUM OF ALL RESPONSES TO PHQ QUESTIONS 1-9: 0
SUM OF ALL RESPONSES TO PHQ9 QUESTIONS 1 & 2: 0
SUM OF ALL RESPONSES TO PHQ QUESTIONS 1-9: 0

## 2022-03-29 NOTE — PROGRESS NOTES
[facility]   [address]   [phone]    3/29/2022     CHIEF COMPLAINT:     Rachel Aguilar (:  1985) is a 43 y.o. female, here for evaluation of the following chief complaint(s):   Annual Exam      REVIEWED INFORMATION      Allergies   Allergen Reactions    Amoxicillin-Pot Clavulanate Hives     Other reaction(s): rash    Amoxil [Amoxicillin] Hives    Cefdinir Hives and Rash           Frovatriptan Hives           Metoclopramide Other (See Comments)     Other reaction(s): JITTERY/ELEVATED BP        Morphine Itching    Nsaids Other (See Comments)     Patient Gets Stomach Ulcers    Shrimp (Diagnostic) Hives    Shrimp Extract Allergy Skin Test Hives       Current Outpatient Medications   Medication Sig Dispense Refill    Atogepant (QULIPTA) 30 MG TABS Take by mouth      ondansetron (ZOFRAN-ODT) 4 MG disintegrating tablet DISSOLVE ONE TABLET BY MOUTH EVERY 8 HOURS AS NEEDED FOR NAUSEA OR VOMITING 30 tablet 1    lansoprazole (PREVACID) 30 MG delayed release capsule       levothyroxine (SYNTHROID) 100 MCG tablet Take 1 tablet by mouth daily 90 tablet 0    Multiple Vitamins-Minerals (THERAPEUTIC MULTIVITAMIN-MINERALS) tablet Take 1 tablet by mouth daily      FLUoxetine (PROZAC) 20 MG capsule TAKE 2 CAPSULES DAILY (Patient taking differently: 20 mg nightly ) 180 capsule 1    aMILoride (MIDAMOR) 5 MG tablet Take 1 tablet by mouth daily (Patient taking differently: Take 5 mg by mouth in the morning and at bedtime ) 30 tablet 5    azelastine (ASTELIN) 0.1 % nasal spray 2 sprays by Nasal route 2 times daily      butalbital-acetaminophen-caffeine (FIORICET, ESGIC) -40 MG per tablet Take by mouth daily as needed      naratriptan (AMERGE) 2.5 MG tablet Take by mouth daily as needed      UBRELVY 50 MG TABS Take 100 mg by mouth daily as needed for Migraine       thyroid (ARMOUR) 90 MG tablet Take 160 mg by mouth daily       cetirizine (ZYRTEC) 10 MG tablet Take by mouth nightly       SUMAtriptan Succinate 6 MG/0.5ML SOAJ Inject as directed as needed       Armodafinil (NUVIGIL) 250 MG TABS Take by mouth daily.  pantoprazole (PROTONIX) 40 MG tablet Take 40 mg by mouth nightly       Vitamin D (CHOLECALCIFEROL) 1000 UNITS CAPS capsule Take 4,000 Units by mouth 2 times daily      Magnesium Oxide 500 MG TABS Take 500 mg by mouth 2 times daily      hydrOXYzine (ATARAX) 25 MG tablet Take 1 tablet by mouth every 8 hours as needed for Itching 30 tablet 5    potassium chloride (MICRO-K) 10 MEQ extended release capsule Take 1 capsule by mouth 3 times daily for 30 doses 90 capsule 1     No current facility-administered medications for this visit. Patient Care Team:  VIVIEN Rodriguez CNP as PCP - General (Nurse Practitioner)  VIVIEN Rodriguez CNP as PCP - [facility]  Susana Dobson MD as Consulting Physician (Endocrinology)  Walter Olivares MD as Consulting Physician (Pulmonology)  Catarina Sanchez MD as Consulting Physician (Neurology)  Darnell Smith MD (Bariatric Surgery)  Melissa Newsome MD (Obstetrics & Gynecology)    REVIEW OF SYSTEMS:     Review of Systems   Constitutional: Negative for activity change, appetite change, fatigue and fever. HENT: Negative for congestion, ear pain, facial swelling, sinus pressure and trouble swallowing. Eyes: Negative for pain and visual disturbance. Respiratory: Negative for chest tightness, shortness of breath and wheezing. Cardiovascular: Negative for chest pain, palpitations and leg swelling. Gastrointestinal: Negative for abdominal pain, constipation and diarrhea. Endocrine: Negative for polydipsia, polyphagia and polyuria. Genitourinary: Negative for difficulty urinating, frequency and urgency. Musculoskeletal: Negative for arthralgias and myalgias. Skin: Negative for rash and wound. Neurological: Negative for speech difficulty, weakness and headaches. Hematological: Negative. Psychiatric/Behavioral: Negative for sleep disturbance. The patient is not nervous/anxious. HISTORY OF PRESENT ILLNESS     ANNUAL WELLNESS/ PHYSICAL     Patient presenting for annual exam. Patient concerns today include fatigue. Last Pap was October 2021 was normal Dr. Hanh Diaz. Last mammogram Last November - was referred to [facility] Breast surgeon To be see for the discharge today . History includes no family history of colon cancer, positive family history of breast cancer and Ovarian tumors - due to tumors in uterous. *. Patient has maintained stable weight, maintains diet of lean meats and vegetables, exercises 1-2 times per week and maintains a good sleep pattern of 7-8 hours per night. Patient is decline covid and flu vaccinations. PHYSICAL EXAM:     /80   Pulse 84   Temp 98.5 °F (36.9 °C) (Tympanic)   Ht 5' 5\" (1.651 m)   Wt 288 lb (130.6 kg)   LMP 02/25/2022   SpO2 98%   BMI 47.93 kg/m²      Physical Exam  Vitals reviewed. Constitutional:       Appearance: Normal appearance. She is not ill-appearing. HENT:      Head: Normocephalic and atraumatic. Eyes:      Extraocular Movements: Extraocular movements intact. Pupils: Pupils are equal, round, and reactive to light. Neck:      Vascular: No carotid bruit. Cardiovascular:      Rate and Rhythm: Normal rate and regular rhythm. Pulses: Normal pulses. Heart sounds: Normal heart sounds. Pulmonary:      Effort: Pulmonary effort is normal.      Breath sounds: Normal breath sounds. Abdominal:      General: Abdomen is flat. There is no distension. Palpations: Abdomen is soft. There is no mass. Tenderness: There is no abdominal tenderness. There is no right CVA tenderness or left CVA tenderness. Musculoskeletal:         General: No swelling or tenderness. Normal range of motion. Cervical back: Normal range of motion and neck supple. Skin:     General: Skin is warm. Capillary Refill: Capillary refill takes less than 2 seconds. Findings: No erythema or rash. Neurological:      General: No focal deficit present. Mental Status: She is alert and oriented to person, place, and time. Psychiatric:         Mood and Affect: Mood normal.         Behavior: Behavior normal.          PROCEDURE/ IN OFFICE TESTING/ LAB REVIEW     No in office testing or procedures completed during today's office visit. ASSESSMENT/PLAN/ FOLLOWUP:     PLEASE NOTE THAT ANY DISCONTINUATION OF MEDICATIONS OR MEDICAL SUPPLIES REFLECTED IN TODAY'S VISIT SUMMARY  MAY NOT HAVE COMPLETED AS A CHANGE IN YOUR PLAN OF CARE. THESE CHANGES MAY HAVE ONLY BEEN DONE SO IN ORDER TO CLEAN UP LIST FROM DUPLICATIONS OR MISCELLANEOUS SUPPLIES ONLY NEEDED PERIODIC REORDERS. DO NOT DISCONTINUE MEDICATIONS LISTED UNLESS SPECIFICALLY DISCUSSED IN YOUR APPOINTMENT WITH PROVIDER OR SPECIALIST, IF YOU HAVE AN QUESTIONS, PLEASE CONTACT YOUR PROVIDER FOR CLARIFICATION IF NOT ADDRESSED IN YOUR PLAN OF CARE. 1. Annual physical exam  2. Nausea  -     ondansetron (ZOFRAN-ODT) 4 MG disintegrating tablet; DISSOLVE ONE TABLET BY MOUTH EVERY 8 HOURS AS NEEDED FOR NAUSEA OR VOMITING, Disp-30 tablet, R-1Normal  3. Routine lab draw  -     CBC; Future  -     Comprehensive Metabolic Panel, Fasting; Future  -     Hemoglobin A1C; Future  -     Lipid Panel; Future  -     Vitamin D 25 Hydroxy; Future      No follow-ups on file. COMMUNICATION:             The best way to find yourself is to lose yourself in the service of others - [address] [place] [address]. [address] [place] [address]   [email or web address]. [employer]  Office: [phone]     An electronic signature was used to authenticate this note.   Signed by VIVIEN Jimenez CNP, APRN-CNP on 4/22/2022 at 3:09 PM

## 2022-04-08 ENCOUNTER — HOSPITAL ENCOUNTER (OUTPATIENT)
Age: 37
Setting detail: SPECIMEN
Discharge: HOME | End: 2022-04-08

## 2022-04-08 DIAGNOSIS — Z01.89 ROUTINE LAB DRAW: ICD-10-CM

## 2022-04-08 LAB
ALBUMIN SERPL-MCNC: 4.4 G/DL (ref 3.5–5.2)
ALBUMIN/GLOBULIN RATIO: 1.7 (ref 1–2.5)
ALP BLD-CCNC: 104 U/L (ref 35–104)
ALT SERPL-CCNC: 10 U/L (ref 5–33)
ANION GAP SERPL CALCULATED.3IONS-SCNC: 16 MMOL/L (ref 9–17)
AST SERPL-CCNC: 19 U/L
BILIRUB SERPL-MCNC: 0.37 MG/DL (ref 0.3–1.2)
BUN BLDV-MCNC: 8 MG/DL (ref 6–20)
CALCIUM SERPL-MCNC: 9.4 MG/DL (ref 8.6–10.4)
CHLORIDE BLD-SCNC: 100 MMOL/L (ref 98–107)
CHOLESTEROL/HDL RATIO: 3.6
CHOLESTEROL: 226 MG/DL
CO2: 23 MMOL/L (ref 20–31)
CREAT SERPL-MCNC: 0.75 MG/DL (ref 0.5–0.9)
ESTIMATED AVERAGE GLUCOSE: 94 MG/DL
GFR AFRICAN AMERICAN: >60 ML/MIN
GFR NON-AFRICAN AMERICAN: >60 ML/MIN
GFR SERPL CREATININE-BSD FRML MDRD: NORMAL ML/MIN/{1.73_M2}
GLUCOSE FASTING: 75 MG/DL (ref 70–99)
HBA1C MFR BLD: 4.9 % (ref 4–6)
HCT VFR BLD CALC: 38.2 % (ref 36.3–47.1)
HDLC SERPL-MCNC: 62 MG/DL
HEMOGLOBIN: 13 G/DL (ref 11.9–15.1)
LDL CHOLESTEROL: 152 MG/DL (ref 0–130)
MCH RBC QN AUTO: 29.3 PG (ref 25.2–33.5)
MCHC RBC AUTO-ENTMCNC: 34 G/DL (ref 28.4–34.8)
MCV RBC AUTO: 86.2 FL (ref 82.6–102.9)
NRBC AUTOMATED: 0 PER 100 WBC
PDW BLD-RTO: 12.7 % (ref 11.8–14.4)
PLATELET # BLD: 404 K/UL (ref 138–453)
PMV BLD AUTO: 9.2 FL (ref 8.1–13.5)
POTASSIUM SERPL-SCNC: 3.9 MMOL/L (ref 3.7–5.3)
RBC # BLD: 4.43 M/UL (ref 3.95–5.11)
SODIUM BLD-SCNC: 139 MMOL/L (ref 135–144)
TOTAL PROTEIN: 7 G/DL (ref 6.4–8.3)
TRIGL SERPL-MCNC: 62 MG/DL
VITAMIN D 25-HYDROXY: 28.3 NG/ML
WBC # BLD: 6.1 K/UL (ref 3.5–11.3)

## 2022-04-11 DIAGNOSIS — L29.9 LOCALIZED PRURITUS: ICD-10-CM

## 2022-04-12 RX ORDER — HYDROXYZINE HYDROCHLORIDE 25 MG/1
25 TABLET, FILM COATED ORAL EVERY 8 HOURS PRN
Qty: 30 TABLET | Refills: 5 | Status: SHIPPED | OUTPATIENT
Start: 2022-04-12 | End: 2023-04-12

## 2022-04-12 NOTE — TELEPHONE ENCOUNTER
Last visit: 3-29-22  Last Med refill: 2-26-22  Does patient have enough medication for 72 hours: No:     Next Visit Date:  No future appointments.     Health Maintenance   Topic Date Due    Cervical cancer screen  Never done    COVID-19 Vaccine (1) 08/01/2022 (Originally 6/12/1988)    Flu vaccine (Season Ended) 11/08/2022 (Originally 9/1/2022)    TSH testing  02/23/2023    Depression Monitoring  03/29/2023    A1C test (Diabetic or Prediabetic)  04/08/2023    Potassium monitoring  04/08/2023    Creatinine monitoring  04/08/2023    DTaP/Tdap/Td vaccine (2 - Td or Tdap) 07/28/2025    Hepatitis A vaccine  Aged Out    Hepatitis B vaccine  Aged Out    Hib vaccine  Aged Out    Meningococcal (ACWY) vaccine  Aged Out    Pneumococcal 0-64 years Vaccine  Aged Out    Varicella vaccine  Discontinued    Hepatitis C screen  Discontinued    HIV screen  Discontinued       Hemoglobin A1C (%)   Date Value   04/08/2022 4.9   01/14/2021 4.7   06/25/2020 4.9             ( goal A1C is < 7)   No results found for: LABMICR  LDL Cholesterol (mg/dL)   Date Value   04/08/2022 152 (H)   11/04/2021 157 (H)       (goal LDL is <100)   AST (U/L)   Date Value   04/08/2022 19     ALT (U/L)   Date Value   04/08/2022 10     BUN (mg/dL)   Date Value   04/08/2022 8     BP Readings from Last 3 Encounters:   03/29/22 120/80   12/21/21 120/80   11/11/21 134/88          (goal 120/80)    All Future Testing planned in [facility]  Lab Frequency Next Occurrence   XR Cervical Spine 2 or 3 VW Once 07/01/2022   CARDIAC STRESS TEST EXERCISE ONLY Once 11/08/2021               Patient Active Problem List:     Depression     Arrhythmia     Neurogenic syncope     Migraine     Encounter to establish care     Vitamin D deficiency     Elevated fasting glucose     Hypokalemia     Anxiety     Bradycardia     Chest pain     Postoperative hypothyroidism     Adenomatous goiter, toxic or with hyperthyroidism     Benign essential hypertension     Fatigue     History of Demetria-en-Y gastric bypass     Iron deficiency anemia     Malignant tumor of thyroid gland ([facility] [address]     Morbid obesity with BMI of 40.0-44.9, adult ([facility] [address]

## 2022-04-22 ASSESSMENT — ENCOUNTER SYMPTOMS
CHEST TIGHTNESS: 0
CONSTIPATION: 0
TROUBLE SWALLOWING: 0
SINUS PRESSURE: 0
FACIAL SWELLING: 0
ABDOMINAL PAIN: 0
SHORTNESS OF BREATH: 0
EYE PAIN: 0
DIARRHEA: 0
WHEEZING: 0

## 2022-04-22 NOTE — PATIENT INSTRUCTIONS
Patient Education        Well Visit, Ages 25 to 48: Care Instructions  Overview     Well visits can help you stay healthy. Your doctor has checked your overall health and may have suggested ways to take good care of yourself. Your doctor also may have recommended tests. At home, you can help prevent illness withhealthy eating, regular exercise, and other steps. Follow-up care is a key part of your treatment and safety. Be sure to make and go to all appointments, and call your doctor if you are having problems. It's also a good idea to know your test results and keep alist of the medicines you take. How can you care for yourself at home?  Get screening tests that you and your doctor decide on. Screening helps find diseases before any symptoms appear.  Eat healthy foods. Choose fruits, vegetables, whole grains, protein, and low-fat dairy foods. Limit fat, especially saturated fat. Reduce salt in your diet.  Limit alcohol. If you are a man, have no more than 2 drinks a day or 14 drinks a week. If you are a woman, have no more than 1 drink a day or 7 drinks a week.  Get at least 30 minutes of physical activity on most days of the week. Walking is a good choice. You also may want to do other activities, such as running, swimming, cycling, or playing tennis or team sports. Discuss any changes in your exercise program with your doctor.  Reach and stay at a healthy weight. This will lower your risk for many problems, such as obesity, diabetes, heart disease, and high blood pressure.  Do not smoke or allow others to smoke around you. If you need help quitting, talk to your doctor about stop-smoking programs and medicines. These can increase your chances of quitting for good.  Care for your mental health. It is easy to get weighed down by worry and stress. Learn strategies to manage stress, like deep breathing and mindfulness, and stay connected with your family and community.  If you find you often feel sad or hopeless, talk with your doctor. Treatment can help.  Talk to your doctor about whether you have any risk factors for sexually transmitted infections (STIs). You can help prevent STIs if you wait to have sex with a new partner (or partners) until you've each been tested for STIs. It also helps if you use condoms (male or female condoms) and if you limit your sex partners to one person who only has sex with you. Vaccines are available for some STIs, such as HPV.  Use birth control if it's important to you to prevent pregnancy. Talk with your doctor about the choices available and what might be best for you.  If you think you may have a problem with alcohol or drug use, talk to your doctor. This includes prescription medicines (such as amphetamines and opioids) and illegal drugs (such as cocaine and methamphetamine). Your doctor can help you figure out what type of treatment is best for you.  Protect your skin from too much sun. When you're outdoors from 10 a.m. to 4 p.m., stay in the shade or cover up with clothing and a hat with a wide brim. Wear sunglasses that block UV rays. Even when it's cloudy, put broad-spectrum sunscreen (SPF 30 or higher) on any exposed skin.  See a dentist one or two times a year for checkups and to have your teeth cleaned.  Wear a seat belt in the car. When should you call for help? Watch closely for changes in your health, and be sure to contact your doctor if you have any problems or symptoms that concern you. Where can you learn more? Go to [email or web address] and sign in to your [employer] account. Enter P072 in the [employer] box to learn more about \"Well Visit, Ages 25 to 48: Care Instructions. \"     If you do not have an account, please click on the \"Sign Up Now\" link. Current as of: October 6, 2021               Content Version: 13.2  © 1804-6366 Healthwise, Incorporated. Care instructions adapted under license by [facility] ([facility]).  If you have questions about a medical condition or this instruction, always ask your healthcare professional. [place] [address] any warranty or liability for your use of this information.

## 2022-05-05 DIAGNOSIS — F32.9 MAJOR DEPRESSIVE DISORDER WITH CURRENT ACTIVE EPISODE, UNSPECIFIED DEPRESSION EPISODE SEVERITY, UNSPECIFIED WHETHER RECURRENT: ICD-10-CM

## 2022-05-05 RX ORDER — FLUOXETINE HYDROCHLORIDE 20 MG/1
40 CAPSULE ORAL DAILY
Qty: 180 CAPSULE | Refills: 3 | Status: SHIPPED | OUTPATIENT
Start: 2022-05-05 | End: 2022-05-10 | Stop reason: DRUGHIGH

## 2022-05-05 NOTE — TELEPHONE ENCOUNTER
Request for prozac. Last script sent: 7/1/21  Last OV: 3/29/22  Next Visit Date:  No future appointments.     Health Maintenance   Topic Date Due    Cervical cancer screen  Never done    COVID-19 Vaccine (1) 08/01/2022 (Originally 6/12/1988)    Flu vaccine (Season Ended) 11/08/2022 (Originally 9/1/2022)    TSH  02/23/2023    Depression Monitoring  03/29/2023    A1C test (Diabetic or Prediabetic)  04/08/2023    Potassium  04/08/2023    Creatinine  04/08/2023    DTaP/Tdap/Td vaccine (2 - Td or Tdap) 07/28/2025    Hepatitis A vaccine  Aged Out    Hepatitis B vaccine  Aged Out    Hib vaccine  Aged Out    Meningococcal (ACWY) vaccine  Aged Out    Pneumococcal 0-64 years Vaccine  Aged Out    Varicella vaccine  Discontinued    Hepatitis C screen  Discontinued    HIV screen  Discontinued       Hemoglobin A1C (%)   Date Value   04/08/2022 4.9   01/14/2021 4.7   06/25/2020 4.9             ( goal A1C is < 7)   No results found for: LABMICR  LDL Cholesterol (mg/dL)   Date Value   04/08/2022 152 (H)       (goal LDL is <100)   AST (U/L)   Date Value   04/08/2022 19     ALT (U/L)   Date Value   04/08/2022 10     BUN (mg/dL)   Date Value   04/08/2022 8     BP Readings from Last 3 Encounters:   03/29/22 120/80   12/21/21 120/80   11/11/21 134/88          (goal 120/80)    All Future Testing planned in [facility]  Lab Frequency Next Occurrence   XR Cervical Spine 2 or 3 VW Once 07/01/2022   CARDIAC STRESS TEST EXERCISE ONLY Once 11/08/2021         Patient Active Problem List:     Depression     Arrhythmia     Neurogenic syncope     Migraine     Encounter to establish care     Vitamin D deficiency     Elevated fasting glucose     Hypokalemia     Anxiety     Bradycardia     Chest pain     Postoperative hypothyroidism     Adenomatous goiter, toxic or with hyperthyroidism     Benign essential hypertension     Fatigue     History of Demetria-en-Y gastric bypass     Iron deficiency anemia     Malignant tumor of thyroid gland ([facility]     Morbid obesity with BMI of 40.0-44.9, adult ([facility]

## 2022-05-10 ENCOUNTER — TELEMEDICINE (OUTPATIENT)
Dept: FAMILY MEDICINE CLINIC | Age: 37
End: 2022-05-10
Payer: COMMERCIAL

## 2022-05-10 DIAGNOSIS — F41.9 ANXIETY: ICD-10-CM

## 2022-05-10 DIAGNOSIS — E55.9 VITAMIN D INSUFFICIENCY: Primary | ICD-10-CM

## 2022-05-10 DIAGNOSIS — F32.9 MAJOR DEPRESSIVE DISORDER WITH CURRENT ACTIVE EPISODE, UNSPECIFIED DEPRESSION EPISODE SEVERITY, UNSPECIFIED WHETHER RECURRENT: ICD-10-CM

## 2022-05-10 PROCEDURE — 99215 OFFICE O/P EST HI 40 MIN: CPT

## 2022-05-10 RX ORDER — ERGOCALCIFEROL 1.25 MG/1
50000 CAPSULE ORAL WEEKLY
Qty: 6 CAPSULE | Refills: 0 | Status: SHIPPED | OUTPATIENT
Start: 2022-05-10 | End: 2022-06-27

## 2022-05-10 RX ORDER — FLUOXETINE HYDROCHLORIDE 40 MG/1
40 CAPSULE ORAL DAILY
Qty: 90 CAPSULE | Refills: 3 | Status: SHIPPED | OUTPATIENT
Start: 2022-05-10 | End: 2022-08-08

## 2022-05-10 RX ORDER — FLUOXETINE HYDROCHLORIDE 20 MG/1
20 CAPSULE ORAL DAILY
Qty: 90 CAPSULE | Refills: 0 | Status: SHIPPED | OUTPATIENT
Start: 2022-05-10 | End: 2022-08-08

## 2022-05-10 ASSESSMENT — PATIENT HEALTH QUESTIONNAIRE - PHQ9
7. TROUBLE CONCENTRATING ON THINGS, SUCH AS READING THE NEWSPAPER OR WATCHING TELEVISION: 1
SUM OF ALL RESPONSES TO PHQ QUESTIONS 1-9: 20
8. MOVING OR SPEAKING SO SLOWLY THAT OTHER PEOPLE COULD HAVE NOTICED. OR THE OPPOSITE, BEING SO FIGETY OR RESTLESS THAT YOU HAVE BEEN MOVING AROUND A LOT MORE THAN USUAL: 2
SUM OF ALL RESPONSES TO PHQ9 QUESTIONS 1 & 2: 6
9. THOUGHTS THAT YOU WOULD BE BETTER OFF DEAD, OR OF HURTING YOURSELF: 0
2. FEELING DOWN, DEPRESSED OR HOPELESS: 3
6. FEELING BAD ABOUT YOURSELF - OR THAT YOU ARE A FAILURE OR HAVE LET YOURSELF OR YOUR FAMILY DOWN: 3
SUM OF ALL RESPONSES TO PHQ QUESTIONS 1-9: 20
1. LITTLE INTEREST OR PLEASURE IN DOING THINGS: 3
SUM OF ALL RESPONSES TO PHQ QUESTIONS 1-9: 20
3. TROUBLE FALLING OR STAYING ASLEEP: 3
SUM OF ALL RESPONSES TO PHQ QUESTIONS 1-9: 20
5. POOR APPETITE OR OVEREATING: 2
4. FEELING TIRED OR HAVING LITTLE ENERGY: 3

## 2022-05-10 ASSESSMENT — ANXIETY QUESTIONNAIRES
7. FEELING AFRAID AS IF SOMETHING AWFUL MIGHT HAPPEN: 2
6. BECOMING EASILY ANNOYED OR IRRITABLE: 3
2. NOT BEING ABLE TO STOP OR CONTROL WORRYING: 3
4. TROUBLE RELAXING: 3
5. BEING SO RESTLESS THAT IT IS HARD TO SIT STILL: 1
1. FEELING NERVOUS, ANXIOUS, OR ON EDGE: 3
GAD7 TOTAL SCORE: 18
IF YOU CHECKED OFF ANY PROBLEMS ON THIS QUESTIONNAIRE, HOW DIFFICULT HAVE THESE PROBLEMS MADE IT FOR YOU TO DO YOUR WORK, TAKE CARE OF THINGS AT HOME, OR GET ALONG WITH OTHER PEOPLE: SOMEWHAT DIFFICULT
3. WORRYING TOO MUCH ABOUT DIFFERENT THINGS: 3

## 2022-05-10 ASSESSMENT — ENCOUNTER SYMPTOMS
COUGH: 0
CONSTIPATION: 0
VOMITING: 0
SHORTNESS OF BREATH: 0
SORE THROAT: 0
DIARRHEA: 0
EYE DISCHARGE: 0
NAUSEA: 0

## 2022-05-10 NOTE — PROGRESS NOTES
Emani Bowling (:  1985) is a Established patient, here for evaluation of the following:    Assessment & Plan   Below is the assessment and plan developed based on review of pertinent history, physical exam, labs, studies, and medications. 1. Vitamin D insufficiency  -     vitamin D (ERGOCALCIFEROL) 1.25 MG (07980 UT) CAPS capsule; Take 1 capsule by mouth once a week for 6 doses, Disp-6 capsule, R-0Normal  2. Major depressive disorder with current active episode, unspecified depression episode severity, unspecified whether recurrent  -     vitamin D (ERGOCALCIFEROL) 1.25 MG (75849 UT) CAPS capsule; Take 1 capsule by mouth once a week for 6 doses, Disp-6 capsule, R-0Normal  -     FLUoxetine (PROZAC) 20 MG capsule; Take 1 capsule by mouth daily, Disp-90 capsule, R-0Normal  -     FLUoxetine (PROZAC) 40 MG capsule; Take 1 capsule by mouth daily With 1 capsule of 20mg, for total daily dose of 60mg, Disp-90 capsule, R-3Normal  3. Anxiety  -     FLUoxetine (PROZAC) 20 MG capsule; Take 1 capsule by mouth daily, Disp-90 capsule, R-0Normal  -     FLUoxetine (PROZAC) 40 MG capsule; Take 1 capsule by mouth daily With 1 capsule of 20mg, for total daily dose of 60mg, Disp-90 capsule, R-3Normal    Currently takes 2, 20 mg doses of Prozac for a total of 40 mg. At this time I am going to order her a 40 mg, to take with 20 mg for a total of 60 mg every evening. Patient states that she has coverage for therapy at [facility] behavioral, and is going to look into this, as I advised her this would be a very good idea given her current life stressors and divorce. Advised patient that she may follow-up with myself and/or her PCP for routine management, and may increase patient to a total of 80 mg of Prozac as this is the max dose recommendation. We did discuss starting a low-dose of Zyprexa with the Prozac, however at this time reluctant due to other documented health history and medication contraindications.     Patient verbalizes she would seek emergency treatment if any suicidal thoughts turned to plans. Return in about 2 weeks (around 5/24/2022). Subjective   Patient here for increased anxiety and depression. Patient states she is going through a divorce currently, and contributes this to the increased depression. Patient does not currently see a therapist.  She is having a difficult time, as she has not told their young children about the divorce. Patient states she will have thoughts of suicide in thoughts that she would be better off not around, but states that she has never formulated a plan or would never follow through with any thoughts, as her children are her reason why. Patient was previously prescribed Prozac 20 mg for neurocardiogenic syncope. She states that this was started by her neuro cardiologist several years ago, and was increased to 40 mg, after the birth of her daughter, due to some increased depression at that time. Patient also takes medication for borderline narcolepsy, and is slightly reluctant to introduce new medication today that could cause her somnolence. Vitamin D insufficiency was noted during lab review. Patient has had previous bariatric surgery and reversal, and states that she normally takes 6000 international units daily, and will often have a difficult time reaching therapeutic levels. She has previously taken the 50,000 international units once weekly to help boost these levels. Review of Systems   Constitutional: Negative for activity change, fatigue and fever. HENT: Negative for dental problem and sore throat. Eyes: Negative for discharge and visual disturbance. Respiratory: Negative for cough and shortness of breath. Cardiovascular: Negative for chest pain, palpitations and leg swelling. Gastrointestinal: Negative for constipation, diarrhea, nausea and vomiting. Genitourinary: Negative for difficulty urinating and dysuria.    Musculoskeletal: Negative for arthralgias and myalgias. Skin: Negative for rash and wound. Allergic/Immunologic: Negative for environmental allergies. Neurological: Negative for headaches. Hematological: Does not bruise/bleed easily. Psychiatric/Behavioral: Positive for dysphoric mood and suicidal ideas ( no plan or intention). Negative for agitation and sleep disturbance. The patient is nervous/anxious. Objective   Patient-Reported Vitals  No data recorded     Physical Exam  [INSTRUCTIONS:  \"[x]\" Indicates a positive item  \"[]\" Indicates a negative item  -- DELETE ALL ITEMS NOT EXAMINED]    Constitutional: [x] Appears well-developed and well-nourished [x] No apparent distress      [] Abnormal -     Mental status: [x] Alert and awake  [x] Oriented to person/place/time [x] Able to follow commands    [] Abnormal -     Eyes:   EOM    [x]  Normal    [] Abnormal -   Sclera  [x]  Normal    [] Abnormal -          Discharge [x]  None visible   [] Abnormal -     HENT: [x] Normocephalic, atraumatic  [] Abnormal -   [x] Mouth/Throat: Mucous membranes are moist    External Ears [x] Normal  [] Abnormal -    Neck: [x] No visualized mass [] Abnormal -     Pulmonary/Chest: [x] Respiratory effort normal   [x] No visualized signs of difficulty breathing or respiratory distress        [] Abnormal -      Musculoskeletal:   [x] Normal gait with no signs of ataxia         [x] Normal range of motion of neck        [] Abnormal -     Neurological:        [x] No Facial Asymmetry (Cranial nerve 7 motor function) (limited exam due to video visit)          [x] No gaze palsy        [] Abnormal -          Skin:        [x] No significant exanthematous lesions or discoloration noted on facial skin         [] Abnormal -            Psychiatric:       [x] Normal Affect [x] Abnormal - tearful     [x] No Hallucinations    Other pertinent observable physical exam findings:-Patient is intermittently tearful throughout today's examination.   She is very pleasant, and just feels that she is going through a lot currently. Patient is open to seeing a therapist.  She denies any plan or intent to harm herself. She verbalizes that she would seek emergency help if that is ever crossed her mind. On this date 5/10/2022 I have spent 40 minutes reviewing previous notes, test results and face to face (virtual) with the patient discussing the diagnosis and importance of compliance with the treatment plan as well as documenting on the day of the visit. Yovany Bowless, was evaluated through a synchronous (real-time) audio-video encounter. The patient (or guardian if applicable) is aware that this is a billable service, which includes applicable co-pays. This Virtual Visit was conducted with patient's (and/or legal guardian's) consent. The visit was conducted pursuant to the emergency declaration under the [facility], [address] [place] [address] [facility] authority and the [employer] and [employer] General Act. Patient identification was verified, and a caregiver was present when appropriate. The patient was located at home in a state where the provider was licensed to provide care.        --[employer], APRN - CNP

## 2022-05-18 DIAGNOSIS — R11.0 NAUSEA: ICD-10-CM

## 2022-05-18 RX ORDER — ONDANSETRON 4 MG/1
TABLET, ORALLY DISINTEGRATING ORAL
Qty: 30 TABLET | Refills: 5 | Status: SHIPPED | OUTPATIENT
Start: 2022-05-18 | End: 2022-10-17

## 2022-05-23 ENCOUNTER — OFFICE VISIT (OUTPATIENT)
Dept: PRIMARY CARE CLINIC | Age: 37
End: 2022-05-23
Payer: COMMERCIAL

## 2022-05-23 VITALS
DIASTOLIC BLOOD PRESSURE: 82 MMHG | TEMPERATURE: 98.2 F | SYSTOLIC BLOOD PRESSURE: 132 MMHG | OXYGEN SATURATION: 99 % | WEIGHT: 288 LBS | BODY MASS INDEX: 47.93 KG/M2 | HEART RATE: 70 BPM

## 2022-05-23 DIAGNOSIS — M54.41 ACUTE RIGHT-SIDED LOW BACK PAIN WITH RIGHT-SIDED SCIATICA: Primary | ICD-10-CM

## 2022-05-23 PROCEDURE — 96372 THER/PROPH/DIAG INJ SC/IM: CPT | Performed by: PHYSICIAN ASSISTANT

## 2022-05-23 PROCEDURE — 99213 OFFICE O/P EST LOW 20 MIN: CPT | Performed by: PHYSICIAN ASSISTANT

## 2022-05-23 RX ORDER — KETOROLAC TROMETHAMINE 30 MG/ML
60 INJECTION, SOLUTION INTRAMUSCULAR; INTRAVENOUS ONCE
Status: COMPLETED | OUTPATIENT
Start: 2022-05-23 | End: 2022-05-23

## 2022-05-23 RX ORDER — CYCLOBENZAPRINE HCL 5 MG
5 TABLET ORAL 2 TIMES DAILY PRN
Qty: 20 TABLET | Refills: 0 | Status: SHIPPED | OUTPATIENT
Start: 2022-05-23 | End: 2022-06-02

## 2022-05-23 RX ORDER — LEVOTHYROXINE, LIOTHYRONINE 76; 18 UG/1; UG/1
TABLET ORAL
COMMUNITY
Start: 2022-05-15

## 2022-05-23 RX ORDER — PREDNISONE 20 MG/1
20 TABLET ORAL 2 TIMES DAILY
Qty: 10 TABLET | Refills: 0 | Status: SHIPPED | OUTPATIENT
Start: 2022-05-23 | End: 2022-05-28

## 2022-05-23 RX ADMIN — KETOROLAC TROMETHAMINE 60 MG: 30 INJECTION, SOLUTION INTRAMUSCULAR; INTRAVENOUS at 14:56

## 2022-05-23 ASSESSMENT — ENCOUNTER SYMPTOMS
GASTROINTESTINAL NEGATIVE: 1
BACK PAIN: 1
RESPIRATORY NEGATIVE: 1

## 2022-05-23 NOTE — LETTER
[facility] [address]  [address] [place] [address]  Phone: [phone]  Fax: [phone]    Dolores Gunderson        May 23, 2022     Patient: Angy Scott   YOB: 1985   Date of Visit: 5/23/2022       To Whom it May Concern:    Jayson Bishop was seen in my clinic on 5/23/2022. Please excuse Angy Scott  From work on 05/24/2022. She may return to work on 05/25/2022. If you have any questions or concerns, please don't hesitate to call.     Sincerely,         Mendel Dumas PA-C

## 2022-05-23 NOTE — PROGRESS NOTES
[place] [address] [place]  [address] [facility]  Phone: [phone]  Fax: [place]    Pt Name: Cecilio Rajput  MRN: [id number]  Isaías 1985  Date of evaluation: 5/23/2022  Provider: Michael Gonzalez PA-C     CHIEF COMPLAINT       Chief Complaint   Patient presents with    Back Pain     back pain, sciatica bothering her. She states that See Duenas gave her an anti-inflammatory medication and that helped right away. HISTORY OF PRESENT ILLNESS  (Location/Symptom, Timing/Onset, Context/Setting, Quality, Duration, Modifying Factors, Severity.)   Cecilio Rajput is a 43 y.o. White (non-) [1] female who presents to the office for evaluation of      Back Pain  This is a new problem. The current episode started in the past 7 days. The problem occurs daily. The problem has been waxing and waning since onset. The pain is present in the lumbar spine. The quality of the pain is described as shooting. The pain radiates to the right thigh and right knee. The pain is at a severity of 7/10. The pain is moderate. Associated symptoms include leg pain. Nursing Notes were reviewed. REVIEW OF SYSTEMS    (2-9 systems for level 4, 10 or more for level 5)     Review of Systems   Constitutional: Negative for chills, diaphoresis and fatigue. HENT: Negative. Respiratory: Negative. Cardiovascular: Negative. Gastrointestinal: Negative. Genitourinary: Negative. Musculoskeletal: Positive for back pain. Except as noted above the remainder of the review of systems was reviewed andnegative. PAST MEDICAL HISTORY   History reviewed.     Past Medical History:   Diagnosis Date    Arrhythmia 4/7/2011    Depression 4/7/2011    GERD (gastroesophageal reflux disease)     Gastoric presis     Hyperthyroidism     Migraine 4/7/2011    Neurologic cardiac syncope 4/7/2011    Thyroid cancer [facility])          SURGICAL HISTORY     History reviewed.     Past Surgical History:   Procedure Laterality Date    APPENDECTOMY      BREAST LUMPECTOMY Left     5 lumps removed out of left breast      SECTION      2 C- Sections     GALLBLADDER SURGERY      GASTRIC BYPASS SURGERY      MICHELLE-EN-Y GASTRIC BYPASS N/A     Patient had to get gastric bypass reversed due to Ulcers removed     THYROIDECTOMY Bilateral     whole thyroid removed          CURRENT MEDICATIONS       Current Outpatient Medications   Medication Sig Dispense Refill    NP THYROID 120 MG tablet       cyclobenzaprine (FLEXERIL) 5 MG tablet Take 1 tablet by mouth 2 times daily as needed for Muscle spasms 20 tablet 0    predniSONE (DELTASONE) 20 MG tablet Take 1 tablet by mouth 2 times daily for 5 days 10 tablet 0    ondansetron (ZOFRAN-ODT) 4 MG disintegrating tablet PLACE ONE TABLET BY MOUTH EVERY 8 HOURS AS NEEDED FOR NAUSEA OR VOMITING 30 tablet 5    vitamin D (ERGOCALCIFEROL) 1.25 MG (38939 UT) CAPS capsule Take 1 capsule by mouth once a week for 6 doses 6 capsule 0    FLUoxetine (PROZAC) 20 MG capsule Take 1 capsule by mouth daily 90 capsule 0    FLUoxetine (PROZAC) 40 MG capsule Take 1 capsule by mouth daily With 1 capsule of 20mg, for total daily dose of 60mg 90 capsule 3    hydrOXYzine (ATARAX) 25 MG tablet Take 1 tablet by mouth every 8 hours as needed for Itching 30 tablet 5    Atogepant (QULIPTA) 30 MG TABS Take 60 mg by mouth       lansoprazole (PREVACID) 30 MG delayed release capsule       levothyroxine (SYNTHROID) 100 MCG tablet Take 1 tablet by mouth daily 90 tablet 0    Multiple Vitamins-Minerals (THERAPEUTIC MULTIVITAMIN-MINERALS) tablet Take 1 tablet by mouth daily      azelastine (ASTELIN) 0.1 % nasal spray 2 sprays by Nasal route 2 times daily      butalbital-acetaminophen-caffeine (FIORICET, ESGIC) -40 MG per tablet Take by mouth daily as needed      naratriptan (AMERGE) 2.5 MG tablet Take by mouth daily as needed      UBRELVY 50 MG TABS Take 100 mg by mouth daily as needed for Migraine       cetirizine (ZYRTEC) 10 MG tablet Take by mouth nightly       SUMAtriptan Succinate 6 MG/0.5ML SOAJ Inject as directed as needed       Armodafinil (NUVIGIL) 250 MG TABS Take by mouth daily.  pantoprazole (PROTONIX) 40 MG tablet Take 40 mg by mouth nightly       Vitamin D (CHOLECALCIFEROL) 1000 UNITS CAPS capsule Take 4,000 Units by mouth 2 times daily      Magnesium Oxide 500 MG TABS Take 500 mg by mouth 2 times daily      aMILoride (MIDAMOR) 5 MG tablet Take 1 tablet by mouth daily (Patient taking differently: Take 5 mg by mouth in the morning and at bedtime ) 30 tablet 5     No current facility-administered medications for this visit. ALLERGIES     Amoxicillin-pot clavulanate, Amoxil [amoxicillin], Cefdinir, Frovatriptan, Metoclopramide, Morphine, Nsaids, Shrimp (diagnostic), and Shrimp extract allergy skin test    FAMILY HISTORY           Problem Relation Age of Onset    Thyroid Disease Paternal Grandfather     Migraines Mother     Other Mother 43        uterine fibroids, DEXTER/BSO     High Blood Pressure Father     Heart Disease Father     Migraines Sister     Breast Cancer Maternal Cousin         1st cousin once removed     Thyroid Cancer Maternal Cousin     No Known Problems Brother     No Known Problems Sister      Family Status   Relation Name Status    PGF  (Not Specified)    Mother  Alive    Father  Alive    Sister  Alive    MCousin  (Not Specified)   Breanna Wren Brother  Alive    Sister  Alive          SOCIAL HISTORY      reports that she has never smoked. She has never used smokeless tobacco. She reports that she does not drink alcohol and does not use drugs.       PHYSICAL EXAM    (up to 7 for level 4, 8 or more for level 5)     Vitals:    05/23/22 1413   BP: 132/82   Site: Left Upper Arm   Position: Sitting   Cuff Size: Large Adult   Pulse: 70   Temp: 98.2 °F (36.8 °C)   SpO2: 99%   Weight: 288 lb (130.6 kg)         Physical Exam  Vitals and nursing note reviewed. Constitutional:       General: She is not in acute distress. Appearance: Normal appearance. She is not ill-appearing. HENT:      Head: Normocephalic and atraumatic. Right Ear: External ear normal.      Left Ear: External ear normal.      Nose: Nose normal.      Mouth/Throat:      Mouth: Mucous membranes are moist.   Eyes:      Extraocular Movements: Extraocular movements intact. Conjunctiva/sclera: Conjunctivae normal.      Pupils: Pupils are equal, round, and reactive to light. Pulmonary:      Effort: Pulmonary effort is normal.   Musculoskeletal:      Lumbar back: Spasms and tenderness present. No bony tenderness. Decreased range of motion. Skin:     General: Skin is warm and dry. Neurological:      Mental Status: She is alert and oriented to person, place, and time. DIFFERENTIAL DIAGNOSIS:       Melissa Borja reviewed the disposition diagnosis with the patient and or their family/guardian. I have answered their questions and given discharge instructions. They voiced understanding of these instructions and did not have anyfurther questions or complaints. PROCEDURES:  No orders of the defined types were placed in this encounter. No results found for this visit on 05/23/22. FINALIMPRESSION      Visit Diagnoses and Associated Orders     Acute right-sided low back pain with right-sided sciatica    -  Primary         ORDERS WITHOUT AN ASSOCIATED DIAGNOSIS    NP THYROID 120 MG tablet [[id number]]      cyclobenzaprine (FLEXERIL) 5 MG tablet [[id number]]      predniSONE (DELTASONE) 20 MG tablet [[id number]]      ketorolac (TORADOL) injection 60 mg [[id number]]              PLAN     Return if symptoms worsen or fail to improve.       DISCHARGEMEDICATIONS:  Orders Placed This Encounter   Medications    cyclobenzaprine (FLEXERIL) 5 MG tablet     Sig: Take 1 tablet by mouth 2 times daily as needed for Muscle spasms     Dispense:  20 tablet     Refill:  0    predniSONE (DELTASONE) 20 MG tablet     Sig: Take 1 tablet by mouth 2 times daily for 5 days     Dispense:  10 tablet     Refill:  0    ketorolac (TORADOL) injection 60 mg         Plan:  Motrin 800 mg TID for the discomfort/inflammation. Flexeril prn for the muscle spasms/pain. Heat therapy if desired. Home stretches provided on AVS.  Patient agreeable to treatment plan. Follow up if symptoms do not improve/worsen. Patient instructed to return to the office if symptoms worsen, return, or have any other concerns. Patient understands and is agreeable.          Jolene Devine PA-C 5/23/2022 3:13 PM

## 2022-05-23 NOTE — PATIENT INSTRUCTIONS
Patient Education        Learning About Low Back Pain  What is low back pain? Low back pain is pain that can occur anywhere below the ribs and above thelegs. It is very common. Almost everyone has it at one time or another. Low back pain can be:   Acute. This is new pain that can last a few days to a few weeks--at the most a few months.  Chronic. This pain can last for more than a few months. Sometimes it can last for years. What are some myths about low back pain? Here are some common myths about low back pain--and the facts:  Myth: \"I need to rest my back when I have back pain. \"   Fact: Staying active won't hurt you. It may help you get better faster. Myth: \"I need prescription pain medicine. \"   Fact: It's best to try to let time and being active heal your back. Opioid pain medicines--such as hydrocodone or oxycodone--usually don't work any better than over-the-counter medicines like ibuprofen or naproxen. And opioids can cause serious problems like opioid use disorder or overdose. Moderate to severeopioid use disorder is sometimes called addiction. Myth: \"I need a test like an X-ray or an MRI to diagnose my low back pain. \"   Fact: Getting a test right away won't help you get better faster. And it could lead you down a treatment path you may not need, since most people get better ontheir own. What causes low back pain? In most cases, there isn't a clear cause. This can be frustrating, because yourback hurts and there's no obvious reason. Your back pain can be caused by:  Overuse or muscle strain. This can happen from playing sports, lifting heavy things, or not beingphysically fit. A herniated disc. This is a problem with the cushion between the bones in your back. Arthritis. With age, you may have changes in your bones that can narrow the space aroundyour nerves. Other causes. In rare cases, the cause is a serious illness like an infection or cancer.  Butthere are usually other symptoms too.  What are the symptoms? Back pain can come on quickly or over time. You may feel:   Pain in your hips or buttock.  Leg pain, numbness, tingling, or weakness. When a nerve gets squeezed--such as from a disc problem or arthritis--you may have symptoms in your leg or foot. You can even have leg symptoms from a back problem without having any pain in your back.  Pain that's sharp or dull, sometimes with stiffness or muscle spasms. It may be in one small area or over a broad area. But even bad pain doesn't mean that it's caused by something serious. How is low back pain diagnosed? A physical exam is the main way to diagnose low back pain. Your doctor may examine your back, check your nerves by testing your reflexes, and make sure that your muscles are strong. Your doctor also will ask questions about yourback and overall health. Most people don't need any tests right away. Tests often don't show the reason for your pain. If your pain lasts more than 6 weeks or you have symptoms that your doctor is more concerned about, then your doctor may order tests. These may include an X-ray, a CT scan, or an MRI. Sometimes other tests such as a bone scan or nerveconduction test may be done. How is low back pain treated? Most acute low back pain gets better on its own within a few weeks, no matter what the cause. Time and doing usual activities are all that most people needto feel better. Using heat or ice and taking over-the-counter pain medicine also can help whileyour body heals. If you aren't getting better on your own or your pain is very bad, your doctormay recommend:   Physical therapy.  Spinal manipulation, such as by a chiropractor.  Acupuncture.  Massage.  Injections of steroid medicine in your back (especially for pain that involves your legs). If you have chronic low back pain, treatment will help you understand andmanage your pain. Treatment may include:   Staying active.  This may include walking or doing back exercises.  Physical therapy.  Medicines. Some of these medicines are also used for other problems, like depression.  Pain management. Your doctor may have you see a pain specialist.   Counseling. Having chronic pain can be hard. It may help to talk to someone who can help you cope with your pain. Surgery isn't needed for most people. But it may help some types of low backpain. Follow-up care is a key part of your treatment and safety. Be sure to make and go to all appointments, and call your doctor if you are having problems. It's also a good idea to know your test results and keep alist of the medicines you take. When should you call for help? Call 911 anytime you think you may need emergency care. For example, call if:   You can't move a leg at all. Call your doctor now or seek immediate medical care if:   You have new or worse symptoms in your legs, belly, or buttocks. Symptoms may include:  ? Numbness or tingling. ? Weakness. ? Pain.  You lose bladder or bowel control. Watch closely for changes in your health, and be sure to contact your doctor if:   Along with the back pain, you have a fever, lose weight, or don't feel well.  You do not get better as expected. Where can you learn more? Go to https://Pixel Qi.Wanderful Media. org and sign in to your [employer] Security account. Enter A007 in the [facility] box to learn more about \"Learning About Low Back Pain. \"     If you do not have an account, please click on the \"Sign Up Now\" link. Current as of: July 1, 2021               Content Version: 13.2  © 3795-0197 Healthwise, Incorporated. Care instructions adapted under license by [facility] ([facility]). If you have questions about a medical condition or this instruction, always ask your healthcare professional. [place] [address] any warranty or liability for your use of this information.          Patient Education        Low Back Pain: Exercises  Introduction  Here are some examples of exercises for you to try. The exercises may be suggested for a condition or for rehabilitation. Start each exercise slowly. Ease off the exercises if you start to have pain. You will be told when to start these exercises and which ones will work bestfor you. How to do the exercises  Press-up    1. Lie on your stomach, supporting your body with your forearms. 2. Press your elbows down into the floor to raise your upper back. As you do this, relax your stomach muscles and allow your back to arch without using your back muscles. As your press up, do not let your hips or pelvis come off the floor. 3. Hold for 15 to 30 seconds, then relax. 4. Repeat 2 to 4 times. Alternate arm and leg (bird dog) exercise    Do this exercise slowly. Try to keep your body straight at all times, and donot let one hip drop lower than the other. 1. Start on the floor, on your hands and knees. 2. Tighten your belly muscles. 3. Raise one leg off the floor, and hold it straight out behind you. Be careful not to let your hip drop down, because that will twist your trunk. 4. Hold for about 6 seconds, then lower your leg and switch to the other leg. 5. Repeat 8 to 12 times on each leg. 6. Over time, work up to holding for 10 to 30 seconds each time. 7. If you feel stable and secure with your leg raised, try raising the opposite arm straight out in front of you at the same time. Knee-to-chest exercise    1. Lie on your back with your knees bent and your feet flat on the floor. 2. Bring one knee to your chest, keeping the other foot flat on the floor (or keeping the other leg straight, whichever feels better on your lower back). 3. Keep your lower back pressed to the floor. Hold for at least 15 to 30 seconds. 4. Relax, and lower the knee to the starting position. 5. Repeat with the other leg. Repeat 2 to 4 times with each leg.   6. To get more stretch, put your other leg flat on the floor while pulling your knee to your chest.  Curl-ups    1. Lie on the floor on your back with your knees bent at a 90-degree angle. Your feet should be flat on the floor, about 12 inches from your buttocks. 2. Cross your arms over your chest. If this bothers your neck, try putting your hands behind your neck (not your head), with your elbows spread apart. 3. Slowly tighten your belly muscles and raise your shoulder blades off the floor. 4. Keep your head in line with your body, and do not press your chin to your chest.  5. Hold this position for 1 or 2 seconds, then slowly lower yourself back down to the floor. 6. Repeat 8 to 12 times. Pelvic tilt exercise    1. Lie on your back with your knees bent. 2. \"Brace\" your stomach. This means to tighten your muscles by pulling in and imagining your belly button moving toward your spine. You should feel like your back is pressing to the floor and your hips and pelvis are rocking back. 3. Hold for about 6 seconds while you breathe smoothly. 4. Repeat 8 to 12 times. Heel dig bridging    1. Lie on your back with both knees bent and your ankles bent so that only your heels are digging into the floor. Your knees should be bent about 90 degrees. 2. Then push your heels into the floor, squeeze your buttocks, and lift your hips off the floor until your shoulders, hips, and knees are all in a straight line. 3. Hold for about 6 seconds as you continue to breathe normally, and then slowly lower your hips back down to the floor and rest for up to 10 seconds. 4. Do 8 to 12 repetitions. Hamstring stretch in doorway    1. Lie on your back in a doorway, with one leg through the open door. 2. Slide your leg up the wall to straighten your knee. You should feel a gentle stretch down the back of your leg. 3. Hold the stretch for at least 15 to 30 seconds. Do not arch your back, point your toes, or bend either knee.  Keep one heel touching the floor and the other heel touching the wall. 4. Repeat with your other leg. 5. Do 2 to 4 times for each leg. Hip flexor stretch    1. Kneel on the floor with one knee bent and one leg behind you. Place your forward knee over your foot. Keep your other knee touching the floor. 2. Slowly push your hips forward until you feel a stretch in the upper thigh of your rear leg. 3. Hold the stretch for at least 15 to 30 seconds. Repeat with your other leg. 4. Do 2 to 4 times on each side. Wall sit    1. Stand with your back 10 to 12 inches away from a wall. 2. Lean into the wall until your back is flat against it. 3. Slowly slide down until your knees are slightly bent, pressing your lower back into the wall. 4. Hold for about 6 seconds, then slide back up the wall. 5. Repeat 8 to 12 times. Follow-up care is a key part of your treatment and safety. Be sure to make and go to all appointments, and call your doctor if you are having problems. It's also a good idea to know your test results and keep alist of the medicines you take. Where can you learn more? Go to https://WorldWinger.Comsenz. org and sign in to your YES.TAP account. Enter T847 in the [facility] box to learn more about \"Low Back Pain: Exercises. \"     If you do not have an account, please click on the \"Sign Up Now\" link. Current as of: July 1, 2021               Content Version: 13.2  © 2006-2022 exoro system. Care instructions adapted under license by [facility] ([facility]). If you have questions about a medical condition or this instruction, always ask your healthcare professional. [place] [address] any warranty or liability for your use of this information. Patient Education        Sciatica: Care Instructions  Your Care Instructions     Sciatica (say \"hia-RQ-ub-kuh\") is an irritation of one of the sciatic nerves, which come from the spinal cord in the lower back.  The sciatic nerves and their branches extend down through the buttock to the foot. Sciatica can develop when an injured disc in the back irritates or presses against a spinal nerve root. Its main symptom is pain, numbness, or weakness that is often worse in the legor foot than in the back. Sciatica often will improve and go away with time. Early treatment usuallyincludes medicines and exercises to relieve pain. Follow-up care is a key part of your treatment and safety. Be sure to make and go to all appointments, and call your doctor if you are having problems. It's also a good idea to know your test results and keep alist of the medicines you take. How can you care for yourself at home?  Take pain medicines exactly as directed. ? If the doctor gave you a prescription medicine for pain, take it as prescribed. ? If you are not taking a prescription pain medicine, ask your doctor if you can take an over-the-counter medicine.  Use heat or ice to relieve pain. ? To apply heat, put a warm water bottle, heating pad set on low, or warm cloth on your back. Do not go to sleep with a heating pad on your skin. ? To use ice, put ice or a cold pack on the area for 10 to 20 minutes at a time. Put a thin cloth between the ice and your skin.  Avoid sitting if possible, unless it feels better than standing.  Alternate lying down with short walks. Increase your walking distance as you are able to without making your symptoms worse.  Do not do anything that makes your symptoms worse. When should you call for help? Call 911 anytime you think you may need emergency care. For example, call if:     You are unable to move a leg at all. Call your doctor now or seek immediate medical care if:     You have new or worse symptoms in your legs or buttocks. Symptoms may include:  ? Numbness or tingling. ? Weakness. ? Pain.      You lose bladder or bowel control. Watch closely for changes in your health, and be sure to contact your doctor if:     You are not getting better as expected. Where can you learn more? Go to https://chpepiceweb.Sootoo.com. org and sign in to your [employer] account. Enter [phone] in the [facility] box to learn more about \"Sciatica: Care Instructions. \"     If you do not have an account, please click on the \"Sign Up Now\" link. Current as of: July 1, 2021               Content Version: 13.2  © 2006-2022 [employer]rogen[employer]. Care instructions adapted under license by [facility] ([facility]). If you have questions about a medical condition or this instruction, always ask your healthcare professional. [address] any warranty or liability for your use of this information. Patient Education        Sciatica: Exercises  Introduction  Here are some examples of typical rehabilitation exercises for your condition. Start each exercise slowly. Ease off the exercise if you start to have pain. Your doctor or physical therapist will tell you when you can start theseexercises and which ones will work best for you. When you are not being active, find a comfortable position for rest. Some people are comfortable on the floor or a medium-firm bed with a small pillow under their head and another under their knees. Some people prefer to lie on their side with a pillow between their knees. Don't stay in one position fortoo long. Take short walks (10 to 20 minutes) every 2 to 3 hours. Avoid slopes, hills, and stairs until you feel better. Walk only distances you can manage withoutpain, especially leg pain. How to do the exercises  Back stretches    1. Get down on your hands and knees on the floor. 2. Relax your head and allow it to droop. Round your back up toward the ceiling until you feel a nice stretch in your upper, middle, and lower back. Hold this stretch for as long as it feels comfortable, or about 15 to 30 seconds. 3. Return to the starting position with a flat back while you are on your hands and knees.   4. Let your back sway by pressing your stomach toward the floor. Lift your buttocks toward the ceiling. 5. Hold this position for 15 to 30 seconds. 6. Repeat 2 to 4 times. Follow-up care is a key part of your treatment and safety. Be sure to make and go to all appointments, and call your doctor if you are having problems. It's also a good idea to know your test results and keep alist of the medicines you take. Where can you learn more? Go to https://Xand.Ooploo. org and sign in to your Assurely account. Enter P894 in the [employer] box to learn more about \"Sciatica: Exercises. \"     If you do not have an account, please click on the \"Sign Up Now\" link. Current as of: July 1, 2021               Content Version: 13.2  © 7405-4903 Healthwise, Incorporated. Care instructions adapted under license by [facility] ([facility]). If you have questions about a medical condition or this instruction, always ask your healthcare professional. [place] [address] any warranty or liability for your use of this information.

## 2022-05-26 RX ORDER — POTASSIUM CHLORIDE 1.5 G/1.77G
20 POWDER, FOR SOLUTION ORAL 3 TIMES DAILY
COMMUNITY

## 2022-05-27 ENCOUNTER — TELEMEDICINE (OUTPATIENT)
Dept: FAMILY MEDICINE CLINIC | Age: 37
End: 2022-05-27
Payer: COMMERCIAL

## 2022-05-27 DIAGNOSIS — M54.31 SCIATICA OF RIGHT SIDE: Primary | ICD-10-CM

## 2022-05-27 PROCEDURE — 99213 OFFICE O/P EST LOW 20 MIN: CPT | Performed by: NURSE PRACTITIONER

## 2022-05-27 RX ORDER — METHOCARBAMOL 500 MG/1
500 TABLET, FILM COATED ORAL 4 TIMES DAILY
Qty: 40 TABLET | Refills: 0 | Status: SHIPPED | OUTPATIENT
Start: 2022-05-27 | End: 2022-06-06

## 2022-05-27 RX ORDER — METHYLPREDNISOLONE 4 MG/1
TABLET ORAL
Qty: 1 KIT | Refills: 0 | Status: SHIPPED | OUTPATIENT
Start: 2022-05-27 | End: 2022-06-02

## 2022-05-27 ASSESSMENT — ENCOUNTER SYMPTOMS
CONSTIPATION: 0
BACK PAIN: 1
COUGH: 0
DIARRHEA: 0
SORE THROAT: 0
SHORTNESS OF BREATH: 0
RHINORRHEA: 0

## 2022-05-27 ASSESSMENT — ANXIETY QUESTIONNAIRES
GAD7 TOTAL SCORE: 13
2. NOT BEING ABLE TO STOP OR CONTROL WORRYING: 1
7. FEELING AFRAID AS IF SOMETHING AWFUL MIGHT HAPPEN: 0
3. WORRYING TOO MUCH ABOUT DIFFERENT THINGS: 3
IF YOU CHECKED OFF ANY PROBLEMS ON THIS QUESTIONNAIRE, HOW DIFFICULT HAVE THESE PROBLEMS MADE IT FOR YOU TO DO YOUR WORK, TAKE CARE OF THINGS AT HOME, OR GET ALONG WITH OTHER PEOPLE: SOMEWHAT DIFFICULT
1. FEELING NERVOUS, ANXIOUS, OR ON EDGE: 0
6. BECOMING EASILY ANNOYED OR IRRITABLE: 3
4. TROUBLE RELAXING: 3
5. BEING SO RESTLESS THAT IT IS HARD TO SIT STILL: 3

## 2022-05-27 ASSESSMENT — PATIENT HEALTH QUESTIONNAIRE - PHQ9
SUM OF ALL RESPONSES TO PHQ QUESTIONS 1-9: 17
3. TROUBLE FALLING OR STAYING ASLEEP: 2
9. THOUGHTS THAT YOU WOULD BE BETTER OFF DEAD, OR OF HURTING YOURSELF: 0
SUM OF ALL RESPONSES TO PHQ QUESTIONS 1-9: 17
5. POOR APPETITE OR OVEREATING: 2
7. TROUBLE CONCENTRATING ON THINGS, SUCH AS READING THE NEWSPAPER OR WATCHING TELEVISION: 3
10. IF YOU CHECKED OFF ANY PROBLEMS, HOW DIFFICULT HAVE THESE PROBLEMS MADE IT FOR YOU TO DO YOUR WORK, TAKE CARE OF THINGS AT HOME, OR GET ALONG WITH OTHER PEOPLE: 1
SUM OF ALL RESPONSES TO PHQ QUESTIONS 1-9: 17
1. LITTLE INTEREST OR PLEASURE IN DOING THINGS: 2
SUM OF ALL RESPONSES TO PHQ9 QUESTIONS 1 & 2: 4
SUM OF ALL RESPONSES TO PHQ QUESTIONS 1-9: 17
2. FEELING DOWN, DEPRESSED OR HOPELESS: 2
4. FEELING TIRED OR HAVING LITTLE ENERGY: 1
8. MOVING OR SPEAKING SO SLOWLY THAT OTHER PEOPLE COULD HAVE NOTICED. OR THE OPPOSITE, BEING SO FIGETY OR RESTLESS THAT YOU HAVE BEEN MOVING AROUND A LOT MORE THAN USUAL: 3
6. FEELING BAD ABOUT YOURSELF - OR THAT YOU ARE A FAILURE OR HAVE LET YOURSELF OR YOUR FAMILY DOWN: 2

## 2022-05-27 NOTE — PROGRESS NOTES
Baljit Reardon (:  1985) is a Established patient, here for evaluation of the following:  Chief Complaint   Patient presents with    Back Pain     pain is going down her right leg when sitting         Assessment & Plan   Below is the assessment and plan developed based on review of pertinent history, physical exam, labs, studies, and medications. 1. Sciatica of right side  -     methylPREDNISolone (MEDROL DOSEPACK) 4 MG tablet; Take by mouth., Disp-1 kit, R-0Normal  -     methocarbamol (ROBAXIN) 500 MG tablet; Take 1 tablet by mouth 4 times daily for 10 days, Disp-40 tablet, R-0Normal    Return if symptoms worsen or fail to improve. Subjective     Complaint of right lower back pain with sciatica radiating into right hip and into her right thigh at this point. She was seen in urgent care walk-in clinic on the . She was started with a Toradol shot, 5 days of prednisone 20 mg twice daily, and Flexeril. Patient reports that she has had no improvement with the Flexeril mild improvement on the first day after the Toradol with prednisone she is continue to take the prednisone throughout the week but there is been an improvement and only noted that it is worsening. She does report that she is doing the stretches that were provided to her but is concerned that there is an absolutely no improvement in regards to the pain radiating through the right hip. We discussed increasing her steroid to a methylprednisone pack titration utilizing ice, continuing with the stretching exercises, and changing her over to Robaxin as and a muscle relaxer. If patient has no improvement by next week we will order x-ray and start physical therapy. Review of Systems   Constitutional: Negative for activity change, fatigue and unexpected weight change. HENT: Negative for congestion, ear pain, hearing loss, rhinorrhea and sore throat. Respiratory: Negative for cough and shortness of breath.     Cardiovascular: Negative for chest pain, palpitations and leg swelling. Gastrointestinal: Negative for constipation and diarrhea. Musculoskeletal: Positive for back pain (with right sided sciatica). Negative for gait problem. Neurological: Negative for dizziness, weakness and headaches. Psychiatric/Behavioral: Negative for confusion. The patient is not nervous/anxious. Objective   Patient-Reported Vitals  No data recorded     Physical Exam  Constitutional:       Appearance: Normal appearance. She is well-developed. She is not ill-appearing. Eyes:      General:         Right eye: No discharge. Left eye: No discharge. Extraocular Movements: Extraocular movements intact. Conjunctiva/sclera: Conjunctivae normal.   Neck:      Thyroid: No thyroid mass. Vascular: No JVD. Pulmonary:      Effort: Pulmonary effort is normal. No respiratory distress. Musculoskeletal:      Right shoulder: No deformity. Normal range of motion. Left shoulder: No deformity. Normal range of motion. Cervical back: Normal range of motion. Skin:     General: Skin is moist.      Coloration: Skin is not cyanotic or jaundiced. Findings: No rash. Neurological:      General: No focal deficit present. Mental Status: She is alert and oriented to person, place, and time. Gait: Gait is intact. Psychiatric:         Attention and Perception: Attention normal. She does not perceive auditory or visual hallucinations. Mood and Affect: Mood normal.         Speech: Speech normal.                  Rachel Aguilar, was evaluated through a synchronous (real-time) audio-video encounter. The patient (or guardian if applicable) is aware that this is a billable service, which includes applicable co-pays. This Virtual Visit was conducted with patient's (and/or legal guardian's) consent.  The visit was conducted pursuant to the emergency declaration under the [facility] waiver authority and the [employer] and [employer] General Act. Patient identification was verified, and a caregiver was present when appropriate. The patient was located at Home: [address] [place] [address] 2  [address] [facility]. Provider was located at [facility] ([address] [place], [place] [address]t): [address] [facility],  [address] [facility].         --VIVIEN Wyatt - CNP

## 2022-06-26 DIAGNOSIS — E55.9 VITAMIN D INSUFFICIENCY: ICD-10-CM

## 2022-06-26 DIAGNOSIS — F32.9 MAJOR DEPRESSIVE DISORDER WITH CURRENT ACTIVE EPISODE, UNSPECIFIED DEPRESSION EPISODE SEVERITY, UNSPECIFIED WHETHER RECURRENT: ICD-10-CM

## 2022-06-27 RX ORDER — ERGOCALCIFEROL 1.25 MG/1
50000 CAPSULE ORAL WEEKLY
Qty: 4 CAPSULE | Refills: 3 | Status: SHIPPED | OUTPATIENT
Start: 2022-06-27 | End: 2023-06-27

## 2022-06-27 NOTE — TELEPHONE ENCOUNTER
Last visit:3/29/22  Last Med refill:5/10/22  Does patient have enough medication for 72 hours: Yes    Next Visit Date:  No future appointments.     Health Maintenance   Topic Date Due    Cervical cancer screen  Never done    COVID-19 Vaccine (1) 08/01/2022 (Originally 6/12/1990)    Flu vaccine (Season Ended) 11/08/2022 (Originally 9/1/2022)    A1C test (Diabetic or Prediabetic)  04/08/2023    Depression Monitoring  05/27/2023    DTaP/Tdap/Td vaccine (2 - Td or Tdap) 07/28/2025    Hepatitis A vaccine  Aged Out    Hepatitis B vaccine  Aged Out    Hib vaccine  Aged Out    Meningococcal (ACWY) vaccine  Aged Out    Pneumococcal 0-64 years Vaccine  Aged Out    Varicella vaccine  Discontinued    Hepatitis C screen  Discontinued    HIV screen  Discontinued       Hemoglobin A1C (%)   Date Value   04/08/2022 4.9   01/14/2021 4.7   06/25/2020 4.9             ( goal A1C is < 7)   No results found for: LABMICR  LDL Cholesterol (mg/dL)   Date Value   04/08/2022 152 (H)   11/04/2021 157 (H)       (goal LDL is <100)   AST (U/L)   Date Value   04/08/2022 19     ALT (U/L)   Date Value   04/08/2022 10     BUN (mg/dL)   Date Value   04/08/2022 8     BP Readings from Last 3 Encounters:   05/23/22 132/82   03/29/22 120/80   12/21/21 120/80          (goal 120/80)    All Future Testing planned in [facility]  Lab Frequency Next Occurrence   XR Cervical Spine 2 or 3 VW Once 07/01/2022   CARDIAC STRESS TEST EXERCISE ONLY Once 11/08/2021               Patient Active Problem List:     Depression     Arrhythmia     Neurogenic syncope     Migraine     Encounter to establish care     Vitamin D deficiency     Elevated fasting glucose     Hypokalemia     Anxiety     Bradycardia     Chest pain     Postoperative hypothyroidism     Adenomatous goiter, toxic or with hyperthyroidism     Benign essential hypertension     Fatigue     History of Demetria-en-Y gastric bypass     Iron deficiency anemia     Malignant tumor of thyroid gland ([address] Morbid obesity with BMI of 40.0-44.9, adult ([facility] [address]

## 2022-10-16 DIAGNOSIS — R11.0 NAUSEA: ICD-10-CM

## 2022-10-17 RX ORDER — ONDANSETRON 4 MG/1
TABLET, ORALLY DISINTEGRATING ORAL
Qty: 30 TABLET | Refills: 5 | Status: SHIPPED | OUTPATIENT
Start: 2022-10-17

## 2022-10-17 NOTE — TELEPHONE ENCOUNTER
Request for zofran. Last script sent: 5/18/22  Last OV: 5/27/22  Next Visit Date: return as needed  No future appointments.     Health Maintenance   Topic Date Due    COVID-19 Vaccine (1) Never done    Cervical cancer screen  Never done    Flu vaccine (1) 08/01/2022    A1C test (Diabetic or Prediabetic)  04/08/2023    Depression Monitoring  05/27/2023    DTaP/Tdap/Td vaccine (2 - Td or Tdap) 07/28/2025    Hepatitis A vaccine  Aged Out    Hib vaccine  Aged Out    Meningococcal (ACWY) vaccine  Aged Out    Pneumococcal 0-64 years Vaccine  Aged Out    Varicella vaccine  Discontinued    Hepatitis C screen  Discontinued    HIV screen  Discontinued       Hemoglobin A1C (%)   Date Value   04/08/2022 4.9   01/14/2021 4.7   06/25/2020 4.9             ( goal A1C is < 7)   No results found for: LABMICR  LDL Cholesterol (mg/dL)   Date Value   04/08/2022 152 (H)       (goal LDL is <100)   AST (U/L)   Date Value   04/08/2022 19     ALT (U/L)   Date Value   04/08/2022 10     BUN (mg/dL)   Date Value   04/08/2022 8     BP Readings from Last 3 Encounters:   05/23/22 132/82   03/29/22 120/80   12/21/21 120/80          (goal 120/80)    All Future Testing planned in [facility]  Lab Frequency Next Occurrence   CARDIAC STRESS TEST EXERCISE ONLY Once 11/08/2021         Patient Active Problem List:     Depression     Arrhythmia     Neurogenic syncope     Migraine     Encounter to establish care     Vitamin D deficiency     Elevated fasting glucose     Hypokalemia     Anxiety     Bradycardia     Chest pain     Postoperative hypothyroidism     Adenomatous goiter, toxic or with hyperthyroidism     Benign essential hypertension     Fatigue     History of Demetria-en-Y gastric bypass     Iron deficiency anemia     Malignant tumor of thyroid gland ([facility] [address]     Morbid obesity with BMI of 40.0-44.9, adult ([facility] [address]

## 2022-12-19 ENCOUNTER — TELEMEDICINE (OUTPATIENT)
Dept: FAMILY MEDICINE CLINIC | Age: 37
End: 2022-12-19
Payer: COMMERCIAL

## 2022-12-19 DIAGNOSIS — F32.9 MAJOR DEPRESSIVE DISORDER WITH CURRENT ACTIVE EPISODE, UNSPECIFIED DEPRESSION EPISODE SEVERITY, UNSPECIFIED WHETHER RECURRENT: ICD-10-CM

## 2022-12-19 DIAGNOSIS — L40.9 PSORIASIS: Primary | ICD-10-CM

## 2022-12-19 DIAGNOSIS — L29.9 LOCALIZED PRURITUS: ICD-10-CM

## 2022-12-19 DIAGNOSIS — F41.9 ANXIETY: ICD-10-CM

## 2022-12-19 DIAGNOSIS — E55.9 VITAMIN D INSUFFICIENCY: ICD-10-CM

## 2022-12-19 PROCEDURE — 99214 OFFICE O/P EST MOD 30 MIN: CPT | Performed by: NURSE PRACTITIONER

## 2022-12-19 RX ORDER — ERGOCALCIFEROL 1.25 MG/1
50000 CAPSULE ORAL WEEKLY
Qty: 4 CAPSULE | Refills: 3 | Status: SHIPPED | OUTPATIENT
Start: 2022-12-19 | End: 2023-12-19

## 2022-12-19 RX ORDER — SALICYLIC ACID 6 MG/100ML
1 SHAMPOO TOPICAL WEEKLY
Qty: 160 ML | Refills: 5 | Status: SHIPPED | OUTPATIENT
Start: 2022-12-19 | End: 2023-01-18

## 2022-12-19 RX ORDER — LANSOPRAZOLE 30 MG/1
30 CAPSULE, DELAYED RELEASE ORAL DAILY
Qty: 30 CAPSULE | Refills: 5 | Status: SHIPPED | OUTPATIENT
Start: 2022-12-19 | End: 2023-12-19

## 2022-12-19 RX ORDER — FLUOCINONIDE TOPICAL SOLUTION USP, 0.05% 0.5 MG/ML
SOLUTION TOPICAL 2 TIMES DAILY
COMMUNITY
End: 2022-12-19 | Stop reason: SDUPTHER

## 2022-12-19 RX ORDER — FLUOXETINE HYDROCHLORIDE 20 MG/1
20 CAPSULE ORAL DAILY
Qty: 90 CAPSULE | Refills: 0 | Status: SHIPPED | OUTPATIENT
Start: 2022-12-19 | End: 2023-03-19

## 2022-12-19 RX ORDER — SALICYLIC ACID 6 MG/100ML
SHAMPOO TOPICAL WEEKLY
COMMUNITY
End: 2022-12-19 | Stop reason: SDUPTHER

## 2022-12-19 RX ORDER — HYDROXYZINE HYDROCHLORIDE 25 MG/1
25 TABLET, FILM COATED ORAL EVERY 8 HOURS PRN
Qty: 30 TABLET | Refills: 5 | Status: SHIPPED | OUTPATIENT
Start: 2022-12-19 | End: 2023-12-19

## 2022-12-19 RX ORDER — FLUOCINONIDE TOPICAL SOLUTION USP, 0.05% 0.5 MG/ML
SOLUTION TOPICAL 2 TIMES DAILY
Qty: 60 ML | Refills: 5 | Status: SHIPPED | OUTPATIENT
Start: 2022-12-19

## 2022-12-19 SDOH — ECONOMIC STABILITY: FOOD INSECURITY: WITHIN THE PAST 12 MONTHS, YOU WORRIED THAT YOUR FOOD WOULD RUN OUT BEFORE YOU GOT MONEY TO BUY MORE.: NEVER TRUE

## 2022-12-19 SDOH — ECONOMIC STABILITY: FOOD INSECURITY: WITHIN THE PAST 12 MONTHS, THE FOOD YOU BOUGHT JUST DIDN'T LAST AND YOU DIDN'T HAVE MONEY TO GET MORE.: NEVER TRUE

## 2022-12-19 ASSESSMENT — ANXIETY QUESTIONNAIRES
6. BECOMING EASILY ANNOYED OR IRRITABLE: 3
2. NOT BEING ABLE TO STOP OR CONTROL WORRYING: 1
1. FEELING NERVOUS, ANXIOUS, OR ON EDGE: 0
5. BEING SO RESTLESS THAT IT IS HARD TO SIT STILL: 0
4. TROUBLE RELAXING: 0
7. FEELING AFRAID AS IF SOMETHING AWFUL MIGHT HAPPEN: 0
GAD7 TOTAL SCORE: 4
3. WORRYING TOO MUCH ABOUT DIFFERENT THINGS: 0
IF YOU CHECKED OFF ANY PROBLEMS ON THIS QUESTIONNAIRE, HOW DIFFICULT HAVE THESE PROBLEMS MADE IT FOR YOU TO DO YOUR WORK, TAKE CARE OF THINGS AT HOME, OR GET ALONG WITH OTHER PEOPLE: SOMEWHAT DIFFICULT

## 2022-12-19 ASSESSMENT — SOCIAL DETERMINANTS OF HEALTH (SDOH): HOW HARD IS IT FOR YOU TO PAY FOR THE VERY BASICS LIKE FOOD, HOUSING, MEDICAL CARE, AND HEATING?: NOT HARD AT ALL

## 2022-12-19 ASSESSMENT — PATIENT HEALTH QUESTIONNAIRE - PHQ9
2. FEELING DOWN, DEPRESSED OR HOPELESS: 2
7. TROUBLE CONCENTRATING ON THINGS, SUCH AS READING THE NEWSPAPER OR WATCHING TELEVISION: 0
5. POOR APPETITE OR OVEREATING: 0
SUM OF ALL RESPONSES TO PHQ QUESTIONS 1-9: 10
10. IF YOU CHECKED OFF ANY PROBLEMS, HOW DIFFICULT HAVE THESE PROBLEMS MADE IT FOR YOU TO DO YOUR WORK, TAKE CARE OF THINGS AT HOME, OR GET ALONG WITH OTHER PEOPLE: 1
9. THOUGHTS THAT YOU WOULD BE BETTER OFF DEAD, OR OF HURTING YOURSELF: 0
1. LITTLE INTEREST OR PLEASURE IN DOING THINGS: 2
SUM OF ALL RESPONSES TO PHQ9 QUESTIONS 1 & 2: 4
SUM OF ALL RESPONSES TO PHQ QUESTIONS 1-9: 10
4. FEELING TIRED OR HAVING LITTLE ENERGY: 3
6. FEELING BAD ABOUT YOURSELF - OR THAT YOU ARE A FAILURE OR HAVE LET YOURSELF OR YOUR FAMILY DOWN: 0
8. MOVING OR SPEAKING SO SLOWLY THAT OTHER PEOPLE COULD HAVE NOTICED. OR THE OPPOSITE, BEING SO FIGETY OR RESTLESS THAT YOU HAVE BEEN MOVING AROUND A LOT MORE THAN USUAL: 0
3. TROUBLE FALLING OR STAYING ASLEEP: 3

## 2022-12-19 NOTE — PROGRESS NOTES
Reshma Che (:  1985) is a Established patient, here for evaluation of the following:      Chief Complaint   Patient presents with    Psoriasis     F/u, pt is on waitlist for [facility]    Medication Refill     Pt is needing medication refills       Assessment & Plan   Below is the assessment and plan developed based on review of pertinent history, physical exam, labs, studies, and medications. 1. Major depressive disorder with current active episode, unspecified depression episode severity, unspecified whether recurrent  The following orders have not been finalized:  -     vitamin D (ERGOCALCIFEROL) 1.25 MG (79451 UT) CAPS capsule  -     FLUoxetine (PROZAC) 20 MG capsule  2. Anxiety  The following orders have not been finalized:  -     FLUoxetine (PROZAC) 20 MG capsule  3. Localized pruritus  Comments:  right cheek  The following orders have not been finalized:  Orders:  -     hydrOXYzine HCl (ATARAX) 25 MG tablet  4. Vitamin D insufficiency  The following orders have not been finalized:  -     vitamin D (ERGOCALCIFEROL) 1.25 MG (16873 UT) CAPS capsule    No follow-ups on file. Subjective       Has history of psoriasis for which she has been treated in the past through dermatology she has run out of her past treatments that she had at home. Is unable to see her dermatologist for the next 3 to 6 months according to appointment schedules. No refill will be called at this time she is requesting to refill medications that she can Liska started back with treatment. And she will still follow-up with her dermatologist.      Review of Systems   Skin:  Positive for rash.         Objective   Patient-Reported Vitals  No data recorded     Physical Exam  [INSTRUCTIONS:  \"[x]\" Indicates a positive item  \"[]\" Indicates a negative item  -- DELETE ALL ITEMS NOT EXAMINED]    Constitutional: [x] Appears well-developed and well-nourished [x] No apparent distress      [] Abnormal -     Mental status: [x] Alert and awake  [x] Oriented to person/place/time [x] Able to follow commands    [] Abnormal -     Eyes:   EOM    [x]  Normal    [] Abnormal -   Sclera  [x]  Normal    [] Abnormal -          Discharge [x]  None visible   [] Abnormal -     HENT: [x] Normocephalic, atraumatic  [] Abnormal -   [x] Mouth/Throat: Mucous membranes are moist    External Ears [x] Normal  [] Abnormal -    Neck: [x] No visualized mass [] Abnormal -     Pulmonary/Chest: [x] Respiratory effort normal   [x] No visualized signs of difficulty breathing or respiratory distress        [] Abnormal -      Musculoskeletal:   [x] Normal gait with no signs of ataxia         [x] Normal range of motion of neck        [] Abnormal -     Neurological:        [x] No Facial Asymmetry (Cranial nerve 7 motor function) (limited exam due to video visit)          [x] No gaze palsy        [] Abnormal -          Skin:        [x] No significant exanthematous lesions or discoloration noted on facial skin         [] Abnormal -            Psychiatric:       [x] Normal Affect [] Abnormal -        [x] No Hallucinations    Other pertinent observable physical exam findings:-               Ada Robert, was evaluated through a synchronous (real-time) audio-video encounter. The patient (or guardian if applicable) is aware that this is a billable service, which includes applicable co-pays. This Virtual Visit was conducted with patient's (and/or legal guardian's) consent. The visit was conducted pursuant to the emergency declaration under the [facility], [facility] authority and the [employer] Act. Patient identification was verified, and a caregiver was present when appropriate. The patient was located at Home: [address] [facility]  [address] [facility]. Provider was located at [facility] ([address] [place], [place] [address]): [address] [facility],  [address] [facility].         --Jarad Ramirez, VIVIEN - CNP

## 2022-12-31 ENCOUNTER — HOSPITAL ENCOUNTER (OUTPATIENT)
Age: 37
Discharge: HOME | End: 2022-12-31
Payer: COMMERCIAL

## 2022-12-31 LAB
CALCIUM SERPL-MCNC: 8.1 MG/DL (ref 8.6–10.4)
POTASSIUM SERPL-SCNC: 3 MMOL/L (ref 3.7–5.3)
T3 FREE: 0.48 PG/ML (ref 2.02–4.43)
THYROXINE, FREE: 0.13 NG/DL (ref 0.93–1.7)
TSH SERPL DL<=0.05 MIU/L-ACNC: 57.38 UIU/ML (ref 0.3–5)
VITAMIN D 25-HYDROXY: 24.8 NG/ML

## 2022-12-31 PROCEDURE — 84481 FREE ASSAY (FT-3): CPT

## 2022-12-31 PROCEDURE — 36415 COLL VENOUS BLD VENIPUNCTURE: CPT

## 2022-12-31 PROCEDURE — 82310 ASSAY OF CALCIUM: CPT

## 2022-12-31 PROCEDURE — 84439 ASSAY OF FREE THYROXINE: CPT

## 2022-12-31 PROCEDURE — 84443 ASSAY THYROID STIM HORMONE: CPT

## 2022-12-31 PROCEDURE — 82306 VITAMIN D 25 HYDROXY: CPT

## 2022-12-31 PROCEDURE — 84132 ASSAY OF SERUM POTASSIUM: CPT

## 2023-01-12 ENCOUNTER — HOSPITAL ENCOUNTER (OUTPATIENT)
Age: 38
Discharge: HOME | End: 2023-01-12
Payer: COMMERCIAL

## 2023-01-12 LAB
CALCIUM SERPL-MCNC: 8.5 MG/DL (ref 8.6–10.4)
POTASSIUM SERPL-SCNC: 3 MMOL/L (ref 3.7–5.3)
TSH SERPL DL<=0.05 MIU/L-ACNC: 50.65 UIU/ML (ref 0.3–5)

## 2023-01-12 PROCEDURE — 84439 ASSAY OF FREE THYROXINE: CPT

## 2023-01-12 PROCEDURE — 84443 ASSAY THYROID STIM HORMONE: CPT

## 2023-01-12 PROCEDURE — 84132 ASSAY OF SERUM POTASSIUM: CPT

## 2023-01-12 PROCEDURE — 36415 COLL VENOUS BLD VENIPUNCTURE: CPT

## 2023-01-12 PROCEDURE — 82310 ASSAY OF CALCIUM: CPT

## 2023-01-12 PROCEDURE — 84481 FREE ASSAY (FT-3): CPT

## 2023-01-13 ENCOUNTER — HOSPITAL ENCOUNTER (EMERGENCY)
Age: 38
Discharge: HOME | End: 2023-01-13
Attending: EMERGENCY MEDICINE
Payer: COMMERCIAL

## 2023-01-13 ENCOUNTER — APPOINTMENT (OUTPATIENT)
Dept: GENERAL RADIOLOGY | Age: 38
End: 2023-01-13
Payer: COMMERCIAL

## 2023-01-13 VITALS
RESPIRATION RATE: 16 BRPM | SYSTOLIC BLOOD PRESSURE: 124 MMHG | DIASTOLIC BLOOD PRESSURE: 84 MMHG | BODY MASS INDEX: 48.32 KG/M2 | HEART RATE: 60 BPM | WEIGHT: 290 LBS | OXYGEN SATURATION: 98 % | HEIGHT: 65 IN | TEMPERATURE: 98.1 F

## 2023-01-13 DIAGNOSIS — R07.89 ATYPICAL CHEST PAIN: Primary | ICD-10-CM

## 2023-01-13 DIAGNOSIS — G43.909 MIGRAINE WITHOUT STATUS MIGRAINOSUS, NOT INTRACTABLE, UNSPECIFIED MIGRAINE TYPE: ICD-10-CM

## 2023-01-13 LAB
ABSOLUTE EOS #: 0.2 K/UL (ref 0–0.4)
ABSOLUTE LYMPH #: 2 K/UL (ref 1–4.8)
ABSOLUTE MONO #: 0.5 K/UL (ref 0.1–1.2)
ALBUMIN SERPL-MCNC: 4.2 G/DL (ref 3.5–5.2)
ALBUMIN/GLOBULIN RATIO: 1.4 (ref 1–2.5)
ALP BLD-CCNC: 114 U/L (ref 35–104)
ALT SERPL-CCNC: 12 U/L (ref 5–33)
ANION GAP SERPL CALCULATED.3IONS-SCNC: 12 MMOL/L (ref 9–17)
AST SERPL-CCNC: 23 U/L
BACTERIA: ABNORMAL
BASOPHILS # BLD: 2 % (ref 0–2)
BASOPHILS ABSOLUTE: 0.1 K/UL (ref 0–0.2)
BILIRUB SERPL-MCNC: 0.2 MG/DL (ref 0.3–1.2)
BILIRUBIN URINE: NEGATIVE
BUN BLDV-MCNC: 6 MG/DL (ref 6–20)
CALCIUM SERPL-MCNC: 8.6 MG/DL (ref 8.6–10.4)
CHLORIDE BLD-SCNC: 97 MMOL/L (ref 98–107)
CO2: 28 MMOL/L (ref 20–31)
COLOR: YELLOW
CREAT SERPL-MCNC: 0.85 MG/DL (ref 0.5–0.9)
D-DIMER QUANTITATIVE: 0.45 MG/L FEU
EOSINOPHILS RELATIVE PERCENT: 4 % (ref 1–4)
EPITHELIAL CELLS UA: ABNORMAL /HPF (ref 0–5)
GFR SERPL CREATININE-BSD FRML MDRD: >60 ML/MIN/1.73M2
GLUCOSE BLD-MCNC: 78 MG/DL (ref 70–99)
GLUCOSE URINE: NEGATIVE
HCG QUALITATIVE: NEGATIVE
HCT VFR BLD CALC: 37.8 % (ref 36–46)
HEMOGLOBIN: 13 G/DL (ref 12–16)
KETONES, URINE: NEGATIVE
LEUKOCYTE ESTERASE, URINE: NEGATIVE
LIPASE: 18 U/L (ref 13–60)
LYMPHOCYTES # BLD: 28 % (ref 24–44)
MAGNESIUM: 2.1 MG/DL (ref 1.6–2.6)
MCH RBC QN AUTO: 29.3 PG (ref 26–34)
MCHC RBC AUTO-ENTMCNC: 34.3 G/DL (ref 31–37)
MCV RBC AUTO: 85.3 FL (ref 80–100)
MONOCYTES # BLD: 8 % (ref 2–11)
NITRITE, URINE: NEGATIVE
OTHER OBSERVATIONS UA: ABNORMAL
PDW BLD-RTO: 14 % (ref 12.5–15.4)
PH UA: 6 (ref 5–8)
PLATELET # BLD: 463 K/UL (ref 140–450)
PMV BLD AUTO: 6.5 FL (ref 6–12)
POTASSIUM SERPL-SCNC: 3 MMOL/L (ref 3.7–5.3)
PROTEIN UA: NEGATIVE
RBC # BLD: 4.44 M/UL (ref 4–5.2)
RBC UA: ABNORMAL /HPF (ref 0–2)
SEG NEUTROPHILS: 58 % (ref 36–66)
SEGMENTED NEUTROPHILS ABSOLUTE COUNT: 4.1 K/UL (ref 1.8–7.7)
SODIUM BLD-SCNC: 137 MMOL/L (ref 135–144)
SPECIFIC GRAVITY UA: 1.01 (ref 1–1.03)
T3 FREE: 1.56 PG/ML (ref 2.02–4.43)
THYROXINE, FREE: 0.33 NG/DL (ref 0.93–1.7)
TOTAL PROTEIN: 7.1 G/DL (ref 6.4–8.3)
TROPONIN, HIGH SENSITIVITY: <6 NG/L (ref 0–14)
TROPONIN, HIGH SENSITIVITY: <6 NG/L (ref 0–14)
TURBIDITY: CLEAR
URINE HGB: ABNORMAL
UROBILINOGEN, URINE: NORMAL
WBC # BLD: 6.9 K/UL (ref 3.5–11)
WBC UA: ABNORMAL /HPF (ref 0–5)

## 2023-01-13 PROCEDURE — 36415 COLL VENOUS BLD VENIPUNCTURE: CPT

## 2023-01-13 PROCEDURE — 6360000002 HC RX W HCPCS: Performed by: EMERGENCY MEDICINE

## 2023-01-13 PROCEDURE — 83690 ASSAY OF LIPASE: CPT

## 2023-01-13 PROCEDURE — 96365 THER/PROPH/DIAG IV INF INIT: CPT

## 2023-01-13 PROCEDURE — 71045 X-RAY EXAM CHEST 1 VIEW: CPT

## 2023-01-13 PROCEDURE — 99285 EMERGENCY DEPT VISIT HI MDM: CPT

## 2023-01-13 PROCEDURE — 85025 COMPLETE CBC W/AUTO DIFF WBC: CPT

## 2023-01-13 PROCEDURE — 96375 TX/PRO/DX INJ NEW DRUG ADDON: CPT

## 2023-01-13 PROCEDURE — 84703 CHORIONIC GONADOTROPIN ASSAY: CPT

## 2023-01-13 PROCEDURE — 93005 ELECTROCARDIOGRAM TRACING: CPT | Performed by: EMERGENCY MEDICINE

## 2023-01-13 PROCEDURE — 81001 URINALYSIS AUTO W/SCOPE: CPT

## 2023-01-13 PROCEDURE — 80053 COMPREHEN METABOLIC PANEL: CPT

## 2023-01-13 PROCEDURE — 85379 FIBRIN DEGRADATION QUANT: CPT

## 2023-01-13 PROCEDURE — 84484 ASSAY OF TROPONIN QUANT: CPT

## 2023-01-13 PROCEDURE — 83735 ASSAY OF MAGNESIUM: CPT

## 2023-01-13 RX ORDER — POTASSIUM CHLORIDE 7.45 MG/ML
10 INJECTION INTRAVENOUS ONCE
Status: COMPLETED | OUTPATIENT
Start: 2023-01-13 | End: 2023-01-13

## 2023-01-13 RX ORDER — KETOROLAC TROMETHAMINE 30 MG/ML
30 INJECTION, SOLUTION INTRAMUSCULAR; INTRAVENOUS ONCE
Status: COMPLETED | OUTPATIENT
Start: 2023-01-13 | End: 2023-01-13

## 2023-01-13 RX ORDER — PROCHLORPERAZINE EDISYLATE 5 MG/ML
10 INJECTION INTRAMUSCULAR; INTRAVENOUS ONCE
Status: COMPLETED | OUTPATIENT
Start: 2023-01-13 | End: 2023-01-13

## 2023-01-13 RX ADMIN — POTASSIUM CHLORIDE 10 MEQ: 7.46 INJECTION, SOLUTION INTRAVENOUS at 20:26

## 2023-01-13 RX ADMIN — PROCHLORPERAZINE EDISYLATE 10 MG: 5 INJECTION INTRAMUSCULAR; INTRAVENOUS at 20:19

## 2023-01-13 RX ADMIN — KETOROLAC TROMETHAMINE 30 MG: 30 INJECTION, SOLUTION INTRAMUSCULAR; INTRAVENOUS at 20:18

## 2023-01-13 ASSESSMENT — PAIN SCALES - GENERAL
PAINLEVEL_OUTOF10: 10
PAINLEVEL_OUTOF10: 2

## 2023-01-13 ASSESSMENT — ENCOUNTER SYMPTOMS
DIARRHEA: 0
VOMITING: 0
SHORTNESS OF BREATH: 0
NAUSEA: 0
SORE THROAT: 0
RHINORRHEA: 0
COUGH: 0
BACK PAIN: 0
ABDOMINAL PAIN: 0
EYE PAIN: 0

## 2023-01-13 ASSESSMENT — PAIN DESCRIPTION - ORIENTATION: ORIENTATION: LEFT

## 2023-01-13 ASSESSMENT — PAIN DESCRIPTION - LOCATION: LOCATION: CHEST;HEAD

## 2023-01-13 ASSESSMENT — PAIN DESCRIPTION - DESCRIPTORS: DESCRIPTORS: PRESSURE

## 2023-01-13 ASSESSMENT — PAIN - FUNCTIONAL ASSESSMENT: PAIN_FUNCTIONAL_ASSESSMENT: 0-10

## 2023-01-14 LAB
EKG ATRIAL RATE: 65 BPM
EKG P AXIS: 26 DEGREES
EKG P-R INTERVAL: 160 MS
EKG Q-T INTERVAL: 416 MS
EKG QRS DURATION: 100 MS
EKG QTC CALCULATION (BAZETT): 432 MS
EKG R AXIS: -5 DEGREES
EKG T AXIS: 20 DEGREES
EKG VENTRICULAR RATE: 65 BPM

## 2023-01-14 NOTE — ED PROVIDER NOTES
[facility] Emergency Department  [id number] [address] [facility] [address] [facility] [address]  Phone: [phone]  Fax: [phone] [facility]          Pt Name: Tina العراقي  MRN: [id number]  [place] 1985  Date of evaluation: 1/13/2023      CHIEF COMPLAINT       Chief Complaint   Patient presents with    Migraine     Hx of migraines  Nausea-denies any vomiting    Chest Pain     Chest pressure started this morning along with migraine  Low potassium-lab draw yesterday pt reports K+3       HISTORY OF PRESENT ILLNESS       Tina العراقي is a 42 y.o. female who presents with left-sided migraine headache that is not necessarily out of the ordinary for her. She does get fairly frequent migraine headaches and takes daily medications that are preventative. She also took her rescue medications that did not seem to help much. She took a nap and awoke with some upper chest discomfort which is atypical for her. No history of cardiac disease. No history of PE or DVT. No symptoms of DVT. No dyspnea. No fevers or cough. No other symptoms or concerns. Chest pain is nonradiating. Not pleuritic. REVIEW OF SYSTEMS       Review of Systems   Constitutional:  Negative for chills, fatigue and fever. HENT:  Negative for rhinorrhea and sore throat. Eyes:  Negative for pain. Respiratory:  Negative for cough and shortness of breath. Cardiovascular:  Positive for chest pain. Negative for palpitations and leg swelling. Gastrointestinal:  Negative for abdominal pain, diarrhea, nausea and vomiting. Genitourinary:  Negative for difficulty urinating. Musculoskeletal:  Negative for back pain and neck pain. Skin:  Negative for rash. Neurological:  Positive for headaches. Negative for dizziness, tremors, seizures, syncope, facial asymmetry, speech difficulty, weakness, light-headedness and numbness.       PAST MEDICAL HISTORY    has a past medical history of Arrhythmia, Depression, GERD (gastroesophageal reflux disease), Hyperthyroidism, Migraine, Neurologic cardiac syncope, and Thyroid cancer ([facility] [address] SURGICAL HISTORY      has a past surgical history that includes Gallbladder surgery; Appendectomy; Thyroidectomy (Bilateral);  section; Breast lumpectomy (Left); Gastric bypass surgery; and Demetria-en-Y Gastric Bypass (N/A). CURRENT MEDICATIONS       Current Discharge Medication List        CONTINUE these medications which have NOT CHANGED    Details   Salicylic Acid 6 % SHAM Apply 1 Dose topically once a week  Qty: 160 mL, Refills: 5    Associated Diagnoses: Psoriasis      fluocinonide (LIDEX) 0.05 % external solution Apply topically 2 times daily Apply topically 2 times daily. Qty: 60 mL, Refills: 5    Associated Diagnoses: Psoriasis      fluocinonide (LIDEX) 0.05 % cream Apply topically 2 times daily Apply topically 2 times daily. Qty: 120 g, Refills: 5    Associated Diagnoses: Psoriasis      lansoprazole (PREVACID) 30 MG delayed release capsule Take 1 capsule by mouth daily  Qty: 30 capsule, Refills: 5    Associated Diagnoses: Psoriasis      !! FLUoxetine (PROZAC) 20 MG capsule Take 1 capsule by mouth daily  Qty: 90 capsule, Refills: 0    Associated Diagnoses: Major depressive disorder with current active episode, unspecified depression episode severity, unspecified whether recurrent;  Anxiety      hydrOXYzine HCl (ATARAX) 25 MG tablet Take 1 tablet by mouth every 8 hours as needed for Itching  Qty: 30 tablet, Refills: 5    Associated Diagnoses: Localized pruritus      vitamin D (ERGOCALCIFEROL) 1.25 MG (88754 UT) CAPS capsule Take 1 capsule by mouth once a week  Qty: 4 capsule, Refills: 3    Associated Diagnoses: Major depressive disorder with current active episode, unspecified depression episode severity, unspecified whether recurrent; Vitamin D insufficiency      ondansetron (ZOFRAN-ODT) 4 MG disintegrating tablet DISSOLVE ONE TABLET BY MOUTH EVERY 8 HOURS AS NEEDED FOR NAUSEA OR VOMITING  Qty: 30 tablet, Refills: 5    Associated Diagnoses: Nausea      potassium chloride (KLOR-CON) 20 MEQ packet Take 20 mEq by mouth 3 times daily      NP THYROID 120 MG tablet       !! FLUoxetine (PROZAC) 40 MG capsule Take 1 capsule by mouth daily With 1 capsule of 20mg, for total daily dose of 60mg  Qty: 90 capsule, Refills: 3    Associated Diagnoses: Major depressive disorder with current active episode, unspecified depression episode severity, unspecified whether recurrent; Anxiety      Atogepant (QULIPTA) 30 MG TABS Take 60 mg by mouth       levothyroxine (SYNTHROID) 100 MCG tablet Take 1 tablet by mouth daily  Qty: 90 tablet, Refills: 0      Multiple Vitamins-Minerals (THERAPEUTIC MULTIVITAMIN-MINERALS) tablet Take 1 tablet by mouth daily      aMILoride (MIDAMOR) 5 MG tablet Take 1 tablet by mouth daily  Qty: 30 tablet, Refills: 5    Associated Diagnoses: Hypokalemia; Swelling      azelastine (ASTELIN) 0.1 % nasal spray 2 sprays by Nasal route 2 times daily      butalbital-acetaminophen-caffeine (FIORICET, ESGIC) -40 MG per tablet Take by mouth daily as needed      naratriptan (AMERGE) 2.5 MG tablet Take by mouth daily as needed      UBRELVY 50 MG TABS Take 100 mg by mouth daily as needed for Migraine       cetirizine (ZYRTEC) 10 MG tablet Take by mouth nightly       SUMAtriptan Succinate 6 MG/0.5ML SOAJ Inject as directed as needed       Armodafinil 250 MG TABS Take by mouth daily. pantoprazole (PROTONIX) 40 MG tablet Take 40 mg by mouth nightly       Vitamin D (CHOLECALCIFEROL) 1000 UNITS CAPS capsule Take 4,000 Units by mouth 2 times daily      Magnesium Oxide 500 MG TABS Take 500 mg by mouth 2 times daily       ! ! - Potential duplicate medications found. Please discuss with provider.           ALLERGIES     is allergic to amoxicillin-pot clavulanate, amoxil [amoxicillin], cefdinir, frovatriptan, metoclopramide, morphine, nsaids, shrimp (diagnostic), and shrimp extract allergy skin test.    FAMILY HISTORY     She indicated that her mother is alive. She indicated that her father is alive. She indicated that both of her sisters are alive. She indicated that her brother is alive. She indicated that the status of her paternal grandfather is unknown. She indicated that the status of her maternal cousin is unknown.     family history includes Breast Cancer in her maternal cousin; Heart Disease in her father; High Blood Pressure in her father; Migraines in her mother and sister; No Known Problems in her brother and sister; Other (age of onset: 43) in her mother; Thyroid Cancer in her maternal cousin; Thyroid Disease in her paternal grandfather. SOCIAL HISTORY      reports that she has never smoked. She has never used smokeless tobacco. She reports that she does not drink alcohol and does not use drugs. PHYSICAL EXAM     INITIAL VITALS:  height is 5' 5\" (1.651 m) and weight is 131.5 kg (290 lb). Her oral temperature is 98.1 °F (36.7 °C). Her blood pressure is 144/97 (abnormal) and her pulse is 58. Her respiration is 15 and oxygen saturation is 98%. Physical Exam  Vitals reviewed. Constitutional:       General: She is not in acute distress. Appearance: She is well-developed. She is not ill-appearing or toxic-appearing. HENT:      Head: Normocephalic and atraumatic. Right Ear: External ear normal.      Left Ear: External ear normal.   Eyes:      General: Lids are normal.      Conjunctiva/sclera: Conjunctivae normal.      Pupils: Pupils are equal, round, and reactive to light. Neck:      Trachea: No tracheal deviation. Cardiovascular:      Rate and Rhythm: Normal rate and regular rhythm. Pulmonary:      Effort: Pulmonary effort is normal. No respiratory distress. Breath sounds: Normal breath sounds. Chest:      Comments: No chest tenderness. Abdominal:      Palpations: Abdomen is soft. Tenderness: There is no abdominal tenderness.    Musculoskeletal: Cervical back: Full passive range of motion without pain. No spinous process tenderness or muscular tenderness. Skin:     General: Skin is warm and dry. Neurological:      Mental Status: She is alert and oriented to person, place, and time. GCS: GCS eye subscore is 4. GCS verbal subscore is 5. GCS motor subscore is 6. Cranial Nerves: Cranial nerves 2-12 are intact. No cranial nerve deficit. Sensory: Sensation is intact. No sensory deficit. Motor: Motor function is intact. Coordination: Coordination is intact. Coordination normal.      Gait: Gait is intact. Deep Tendon Reflexes: Reflexes are normal and symmetric. Comments: No focal neurologic deficits. Normal neurologic exam.   Psychiatric:         Speech: Speech normal.         DIFFERENTIAL DIAGNOSIS/ MDM:     Plan at this time will be to initiate a further cardiac work-up. We will also check D-dimer. Her ECG has some nonspecific findings and relatively flattened T waves. DIAGNOSTIC RESULTS     EKG: All EKG's are interpreted by the Emergency Department Physician who either signs or Co-signs this chart in the absence of a cardiologist.  Interpreted by Sade Ramos DO     Rhythm: normal sinus   Rate: 65  Axis: Leftward  Ectopy: None  Conduction: Sinus rhythm. Minor incomplete right bundle graciela block type pattern  ST Segments: No elevation or depression  T Waves: Relatively flattened diffusely. Clinical Impression: Nonspecific ECG. Sinus rhythm. No acute infarction/ischemia noted. Compared with ECG from November 2021 and no significant appreciable changes noted. She also had a stress test done at that time that was reported as within normal limits according to the chart. RADIOLOGY:   Interpretation per the Radiologist below, if available at the time of this note:  XR CHEST PORTABLE   Final Result   No acute process. No results found.     LABS:  Results for orders placed or performed during the hospital encounter of 01/13/23   Troponin   Result Value Ref Range    Troponin, High Sensitivity <6 0 - 14 ng/L   Troponin   Result Value Ref Range    Troponin, High Sensitivity <6 0 - 14 ng/L   Comprehensive Metabolic Panel   Result Value Ref Range    Glucose 78 70 - 99 mg/dL    BUN 6 6 - 20 mg/dL    Creatinine 0.85 0.50 - 0.90 mg/dL    Est, Glom Filt Rate >60 >60 mL/min/1.73m2    Calcium 8.6 8.6 - 10.4 mg/dL    Sodium 137 135 - 144 mmol/L    Potassium 3.0 (L) 3.7 - 5.3 mmol/L    Chloride 97 (L) 98 - 107 mmol/L    CO2 28 20 - 31 mmol/L    Anion Gap 12 9 - 17 mmol/L    Alkaline Phosphatase 114 (H) 35 - 104 U/L    ALT 12 5 - 33 U/L    AST 23 <32 U/L    Total Bilirubin 0.2 (L) 0.3 - 1.2 mg/dL    Total Protein 7.1 6.4 - 8.3 g/dL    Albumin 4.2 3.5 - 5.2 g/dL    Albumin/Globulin Ratio 1.4 1.0 - 2.5   CBC with Auto Differential   Result Value Ref Range    WBC 6.9 3.5 - 11.0 k/uL    RBC 4.44 4.0 - 5.2 m/uL    Hemoglobin 13.0 12.0 - 16.0 g/dL    Hematocrit 37.8 36 - 46 %    MCV 85.3 80 - 100 fL    MCH 29.3 26 - 34 pg    MCHC 34.3 31 - 37 g/dL    RDW 14.0 12.5 - 15.4 %    Platelets 129 (H) 180 - 450 k/uL    MPV 6.5 6.0 - 12.0 fL    Seg Neutrophils 58 36 - 66 %    Lymphocytes 28 24 - 44 %    Monocytes 8 2 - 11 %    Eosinophils % 4 1 - 4 %    Basophils 2 0 - 2 %    Segs Absolute 4.10 1.8 - 7.7 k/uL    Absolute Lymph # 2.00 1.0 - 4.8 k/uL    Absolute Mono # 0.50 0.1 - 1.2 k/uL    Absolute Eos # 0.20 0.0 - 0.4 k/uL    Basophils Absolute 0.10 0.0 - 0.2 k/uL   Magnesium   Result Value Ref Range    Magnesium 2.1 1.6 - 2.6 mg/dL   Lipase   Result Value Ref Range    Lipase 18 13 - 60 U/L   Urinalysis with Reflex to Culture    Specimen: Urine, clean catch   Result Value Ref Range    Color, UA Yellow Yellow    Turbidity UA Clear Clear    Glucose, Ur NEGATIVE NEGATIVE    Bilirubin Urine NEGATIVE NEGATIVE    Ketones, Urine NEGATIVE NEGATIVE    Specific Gravity, UA 1.015 1.005 - 1.030    Urine Hgb TRACE (A) NEGATIVE    pH, UA 6.0 5.0 - 8.0    Protein, UA NEGATIVE NEGATIVE    Urobilinogen, Urine Normal Normal    Nitrite, Urine NEGATIVE NEGATIVE    Leukocyte Esterase, Urine NEGATIVE NEGATIVE   HCG Qualitative, Serum   Result Value Ref Range    hCG Qual NEGATIVE NEGATIVE   D-Dimer, Quantitative   Result Value Ref Range    D-Dimer, Quant 0.45 mg/L FEU   Microscopic Urinalysis   Result Value Ref Range    WBC, UA 0 TO 2 0 - 5 /HPF    RBC, UA 2 TO 5 0 - 2 /HPF    Epithelial Cells UA 0 TO 2 0 - 5 /HPF    Bacteria, UA FEW (A) None    Other Observations UA (A) NOT REQ. Utilizing a urinalysis as the only screening method to exclude a potential uropathogen can be unreliable in many patient populations. Rapid screening tests are less sensitive than culture and if UTI is a clinical possibility, culture should be considered despite a negative urinalysis. EKG 12 Lead   Result Value Ref Range    Ventricular Rate 65 BPM    Atrial Rate 65 BPM    P-R Interval 160 ms    QRS Duration 100 ms    Q-T Interval 416 ms    QTc Calculation (Bazett) 432 ms    P Axis 26 degrees    R Axis -5 degrees    T Axis 20 degrees       EMERGENCY DEPARTMENT COURSE:     The patient was given the following medications:  Orders Placed This Encounter   Medications    ketorolac (TORADOL) injection 30 mg    potassium chloride 10 mEq/100 mL IVPB (Peripheral Line)    prochlorperazine (COMPAZINE) injection 10 mg        Vitals:    Vitals:    01/13/23 1918 01/13/23 2131   BP: (!) 159/95 (!) 144/97   Pulse: 73 58   Resp: 16 15   Temp: 98.1 °F (36.7 °C)    TempSrc: Oral    SpO2: 98% 98%   Weight: 131.5 kg (290 lb)    Height: 5' 5\" (1.651 m)      -------------------------  BP: (!) 144/97, Temp: 98.1 °F (36.7 °C), Heart Rate: 58, Resp: 15      Re-evaluation Notes    Patient is doing well on reevaluation. No acute changes. Symptoms have all resolved and she feels much better. 2 cardiac enzymes are negative.   It is unclear exactly why she had the chest discomfort however her D-dimer is normal as well.  I do not feel this a PE or DVT or acute coronary syndrome. I also do not feel she has an acute intracranial pathologic process such as hemorrhage or infection. She was advised to follow-up with her PCP. Advised return right away if worsening or for new or concerning symptoms. She is comfortable with this plan. The patient presents with chest pain that is not suggesting in nature of pulmonary embolus, aortic dissection, cardiac ischemia, or other serious etiology. I considered an aortic dissection, but this is unlikely as patient is not complaining of tearing or ripping chest pain that is radiating to the back, the patient has no new neurological abnormalities and pulses are equal to all extremities. Mediastinum is within normal limits. Patient appears comfortable on physical exam and is not in distress. I also thought about a cardiac tamponade, but this is unlikely as patient is hemodynamically stable. Heart sounds are not distant, EKG does not show signs of electrical alternans and there is no JVD. I also thought about a tension pneumothorax, but this is unlikely given bilateral breath sounds and no signs of hemodynamic instability. I do not feel the patient has a PE. No clinical evidence of DVT. I thought about an esophageal perforation, but history and physical exam does not suggest vomiting, followed by chest pain. No signs of Hamman's crunch on physical exam; again, patient appears comfortable and is well appearing and non toxic. The patient has been instructed to return if the symptpoms change or worsen in any way. Given the extremely low risk of these diagnoses further testing and evaluation for these possibilites are not indicated at this time. The patient appears stable for discharge and has been instructed to return immediately if the symptoms worsen in any way, or in 8-12 hr if not improved for re-evaluation.   We also discussed returning to the Emergency Department immediately if new or worsening symptoms occur. We have discussed the symptoms which are most concerning (e.g., worsening pain, shortness of breath, a feeling of passing out, fever, any neurologic symptoms, abdominal pain or vomiting) that necessitate immediate return. The patient understands that at this time there is no evidence for a more malignant underlying process, but the patient also understands that early in the process of an illness or injury, an emergency department workup can be falsely reassuring. Routine discharge counseling was given, and the patient understands that worsening, changing or persistent symptoms should prompt an immediate call or follow up with their primary physician or return to the emergency department. The importance of appropriate follow up was also discussed. I have reviewed the disposition diagnosis with the patient and or their family/guardian. I have answered their questions and given discharge instructions. They voiced understanding of these instructions and did not have any further questions or complaints. CONSULTS:    None    CRITICAL CARE:     None    PROCEDURES:    None    FINAL IMPRESSION      1. Atypical chest pain    2.  Migraine without status migrainosus, not intractable, unspecified migraine type          DISPOSITION/PLAN   DISPOSITION Decision To Discharge 01/13/2023 10:39:52 PM      Condition on Disposition    Improved    PATIENT REFERRED TO:  VIVIEN Banegas CNP  [address] [place] [address]  [phone]    Schedule an appointment as soon as possible for a visit in 2 days      DISCHARGE MEDICATIONS:  Current Discharge Medication List          (Please note that portions of this note were completed with a voice recognition program.  Efforts were made to edit the dictations but occasionally words are mis-transcribed.)    Lanie Ley DO, DO  Attending Emergency Physician       Lanie Ley DO  01/13/23 8652

## 2023-01-14 NOTE — DISCHARGE INSTRUCTIONS
PLEASE RETURN TO THE EMERGENCY DEPARTMENT IMMEDIATELY if your symptoms worsen in anyway or in 8-12 hours if not improved for re-evaluation. You should immediately return to the ER for symptoms such as increasing pain, shortness of breath, fever, a feeling of passing out, light headed, dizziness, abdominal pain, numbness or weakness to the arms or legs, coolness or color change of the arms or legs. Take your medication as indicated and prescribed. If you are given an antibiotic then, make sure you get the prescription filled and take the antibiotics until finished. Please understand that at this time there is no evidence for a more serious underlying process, but that early in the process of an illness or injury, an emergency department workup can be falsely reassuring. You should contact your family doctor within the next 24 hours for a follow up appointment    Sania Hamilton!!!    From [facility] ([facility]) and [facility] Emergency Services    On behalf of the Emergency Department staff at [facility]), I would like to thank you for giving us the opportunity to address your health care needs and concerns. We hope that during your visit, our service was delivered in a professional and caring manner. Please keep [facility] ([facility]) in mind as we walk with you down the path to your own personal wellness. Please expect an automated text message or email from us so we can ask a few questions about your health and progress. Based on your answers, a clinician may call you back to offer help and instructions. Please understand that early in the process of an illness or injury, an emergency department workup can be falsely reassuring. If you notice any worsening, changing or persistent symptoms please call your family doctor or return to the ER immediately. Tell us how we did during your visit at [email or web address]   and let us know about your experience

## 2023-01-17 ENCOUNTER — HOSPITAL ENCOUNTER (OUTPATIENT)
Age: 38
Discharge: HOME | End: 2023-01-17
Payer: COMMERCIAL

## 2023-01-17 ENCOUNTER — TELEPHONE (OUTPATIENT)
Dept: FAMILY MEDICINE CLINIC | Age: 38
End: 2023-01-17

## 2023-01-17 LAB — POTASSIUM SERPL-SCNC: 3.2 MMOL/L (ref 3.7–5.3)

## 2023-01-17 PROCEDURE — 84132 ASSAY OF SERUM POTASSIUM: CPT

## 2023-01-17 PROCEDURE — 36415 COLL VENOUS BLD VENIPUNCTURE: CPT

## 2023-01-17 NOTE — TELEPHONE ENCOUNTER
ED Follow up Call    Reason for ED visit:  Chest pain , migraine  Status: Improved     Did you call your PCP prior to going to the ED? No    Did you receive a discharge instructions from the Emergency Room? Yes  Review of Instructions:     Understands what to report/when to return?:  Yes   Understands discharge instructions?:  Yes   Following discharge instructions?:     Yes   If not why? Are there any new complaints of pain? No  New Pain Meds? No   Constipation prophylaxis needed? N/A    If you have a wound is the dressing clean, dry, and intact? Understands wound care regimen? N/A    Are there any other complaints/concerns that you wish to tell your provider? Patient stated that she called Friday 01/13/2023 for direction Patient verbalized that she did not like how her situation was handled. Patient lab potassium was low and could not get any direction from PCP office. Patient was told that PCP did not order lab work then she would need to follow up with the physician that ordered test. Patient stated she just went to ER. Dr Nikos Russ MD office did call patient and ordered more lab work and scheduled for 01/19/2023. Patient just wanted to inform office that she was seeking care from PCP and felt like she was turned away in her time of need. Patient refused to make a appt with PCP stated she is still very upset with how things were handled. FU appts/Provider:    No future appointments. New Medications?:   IV potassium       Medication Reconciliation by phone - none   Understands Medications?  none  Taking Medications? None given   Can you swallow your pills? yes    Any further needs in the home i.e. Equipment?   No    Link to services in community?:  No   Which services:

## 2023-02-24 ENCOUNTER — HOSPITAL ENCOUNTER (OUTPATIENT)
Age: 38
Discharge: HOME | End: 2023-02-24
Payer: COMMERCIAL

## 2023-02-24 PROCEDURE — 84481 FREE ASSAY (FT-3): CPT

## 2023-02-24 PROCEDURE — 84439 ASSAY OF FREE THYROXINE: CPT

## 2023-02-24 PROCEDURE — 84443 ASSAY THYROID STIM HORMONE: CPT

## 2023-02-24 PROCEDURE — 84132 ASSAY OF SERUM POTASSIUM: CPT

## 2023-02-24 PROCEDURE — 36415 COLL VENOUS BLD VENIPUNCTURE: CPT

## 2023-02-25 LAB
POTASSIUM SERPL-SCNC: 3.2 MMOL/L (ref 3.7–5.3)
T3FREE SERPL-MCNC: <0.4 PG/ML (ref 2.02–4.43)
T4 FREE SERPL-MCNC: <0.1 NG/DL (ref 0.93–1.7)
TSH SERPL-ACNC: 78.11 UIU/ML (ref 0.3–5)

## 2023-02-27 ENCOUNTER — APPOINTMENT (OUTPATIENT)
Dept: GENERAL RADIOLOGY | Age: 38
DRG: 644 | End: 2023-02-27
Payer: COMMERCIAL

## 2023-02-27 ENCOUNTER — HOSPITAL ENCOUNTER (INPATIENT)
Age: 38
LOS: 1 days | Discharge: HOME | DRG: 644 | End: 2023-02-28
Attending: EMERGENCY MEDICINE | Admitting: HOSPITALIST
Payer: COMMERCIAL

## 2023-02-27 DIAGNOSIS — E86.0 DEHYDRATION: ICD-10-CM

## 2023-02-27 DIAGNOSIS — E03.9 HYPOTHYROIDISM, UNSPECIFIED TYPE: Primary | ICD-10-CM

## 2023-02-27 DIAGNOSIS — E16.2 HYPOGLYCEMIA: ICD-10-CM

## 2023-02-27 DIAGNOSIS — E87.6 HYPOKALEMIA: ICD-10-CM

## 2023-02-27 DIAGNOSIS — R60.9 SWELLING: ICD-10-CM

## 2023-02-27 PROBLEM — R42 DIZZINESS: Status: ACTIVE | Noted: 2023-02-27

## 2023-02-27 LAB
ABSOLUTE EOS #: 0.3 K/UL (ref 0–0.4)
ABSOLUTE LYMPH #: 1.7 K/UL (ref 1–4.8)
ABSOLUTE MONO #: 0.4 K/UL (ref 0.1–1.2)
ALBUMIN SERPL-MCNC: 4.3 G/DL (ref 3.5–5.2)
ALBUMIN/GLOBULIN RATIO: 1.4 (ref 1–2.5)
ALP SERPL-CCNC: 85 U/L (ref 35–104)
ALT SERPL-CCNC: 8 U/L (ref 5–33)
ANION GAP SERPL CALCULATED.3IONS-SCNC: 12 MMOL/L (ref 9–17)
AST SERPL-CCNC: 24 U/L
BASOPHILS # BLD: 1 % (ref 0–2)
BASOPHILS ABSOLUTE: 0.1 K/UL (ref 0–0.2)
BILIRUB SERPL-MCNC: 0.3 MG/DL (ref 0.3–1.2)
BUN SERPL-MCNC: 8 MG/DL (ref 6–20)
CALCIUM SERPL-MCNC: 9.2 MG/DL (ref 8.6–10.4)
CHLORIDE SERPL-SCNC: 95 MMOL/L (ref 98–107)
CK SERPL-CCNC: 309 U/L (ref 26–192)
CO2 SERPL-SCNC: 30 MMOL/L (ref 20–31)
CREAT SERPL-MCNC: 1.03 MG/DL (ref 0.5–0.9)
EOSINOPHILS RELATIVE PERCENT: 5 % (ref 1–4)
GFR SERPL CREATININE-BSD FRML MDRD: >60 ML/MIN/1.73M2
GLUCOSE BLD-MCNC: 88 MG/DL (ref 65–105)
GLUCOSE SERPL-MCNC: 89 MG/DL (ref 70–99)
HCO3 VENOUS: 30 MMOL/L (ref 22–29)
HCT VFR BLD AUTO: 39.5 % (ref 36–46)
HGB BLD-MCNC: 13.3 G/DL (ref 12–16)
LYMPHOCYTES # BLD: 24 % (ref 24–44)
MCH RBC QN AUTO: 29.4 PG (ref 26–34)
MCHC RBC AUTO-ENTMCNC: 33.6 G/DL (ref 31–37)
MCV RBC AUTO: 87.4 FL (ref 80–100)
MONOCYTES # BLD: 6 % (ref 2–11)
O2 SAT, VEN: 69 % (ref 60–85)
PCO2, VEN: 46.8 MM HG (ref 41–51)
PDW BLD-RTO: 14.8 % (ref 12.5–15.4)
PH VENOUS: 7.42 (ref 7.32–7.43)
PLATELET # BLD AUTO: 402 K/UL (ref 140–450)
PMV BLD AUTO: 6.5 FL (ref 6–12)
PO2, VEN: 36 MM HG (ref 30–50)
POSITIVE BASE EXCESS, VEN: 5 (ref 0–3)
POTASSIUM SERPL-SCNC: 3.3 MMOL/L (ref 3.7–5.3)
PROT SERPL-MCNC: 7.3 G/DL (ref 6.4–8.3)
RBC # BLD: 4.52 M/UL (ref 4–5.2)
SEG NEUTROPHILS: 64 % (ref 36–66)
SEGMENTED NEUTROPHILS ABSOLUTE COUNT: 4.4 K/UL (ref 1.8–7.7)
SODIUM SERPL-SCNC: 137 MMOL/L (ref 135–144)
TROPONIN I SERPL DL<=0.01 NG/ML-MCNC: 10 NG/L (ref 0–14)
TSH SERPL-ACNC: 90.51 UIU/ML (ref 0.3–5)
WBC # BLD AUTO: 6.8 K/UL (ref 3.5–11)

## 2023-02-27 PROCEDURE — 84481 FREE ASSAY (FT-3): CPT

## 2023-02-27 PROCEDURE — 6360000002 HC RX W HCPCS: Performed by: PHYSICIAN ASSISTANT

## 2023-02-27 PROCEDURE — 82947 ASSAY GLUCOSE BLOOD QUANT: CPT

## 2023-02-27 PROCEDURE — 82803 BLOOD GASES ANY COMBINATION: CPT

## 2023-02-27 PROCEDURE — 1210000000 HC MED SURG R&B

## 2023-02-27 PROCEDURE — 93005 ELECTROCARDIOGRAM TRACING: CPT | Performed by: PHYSICIAN ASSISTANT

## 2023-02-27 PROCEDURE — 2500000003 HC RX 250 WO HCPCS: Performed by: PHYSICIAN ASSISTANT

## 2023-02-27 PROCEDURE — 99285 EMERGENCY DEPT VISIT HI MDM: CPT

## 2023-02-27 PROCEDURE — 80053 COMPREHEN METABOLIC PANEL: CPT

## 2023-02-27 PROCEDURE — 6370000000 HC RX 637 (ALT 250 FOR IP): Performed by: PHYSICIAN ASSISTANT

## 2023-02-27 PROCEDURE — 82550 ASSAY OF CK (CPK): CPT

## 2023-02-27 PROCEDURE — 84484 ASSAY OF TROPONIN QUANT: CPT

## 2023-02-27 PROCEDURE — 84443 ASSAY THYROID STIM HORMONE: CPT

## 2023-02-27 PROCEDURE — 84439 ASSAY OF FREE THYROXINE: CPT

## 2023-02-27 PROCEDURE — 71045 X-RAY EXAM CHEST 1 VIEW: CPT

## 2023-02-27 PROCEDURE — 2580000003 HC RX 258: Performed by: EMERGENCY MEDICINE

## 2023-02-27 PROCEDURE — 85025 COMPLETE CBC W/AUTO DIFF WBC: CPT

## 2023-02-27 PROCEDURE — 6370000000 HC RX 637 (ALT 250 FOR IP): Performed by: NURSE PRACTITIONER

## 2023-02-27 PROCEDURE — 6360000002 HC RX W HCPCS: Performed by: NURSE PRACTITIONER

## 2023-02-27 PROCEDURE — 2580000003 HC RX 258: Performed by: PHYSICIAN ASSISTANT

## 2023-02-27 PROCEDURE — 36415 COLL VENOUS BLD VENIPUNCTURE: CPT

## 2023-02-27 RX ORDER — LANOLIN ALCOHOL/MO/W.PET/CERES
500 CREAM (GRAM) TOPICAL 2 TIMES DAILY
Status: DISCONTINUED | OUTPATIENT
Start: 2023-02-27 | End: 2023-02-28 | Stop reason: HOSPADM

## 2023-02-27 RX ORDER — POTASSIUM CHLORIDE 20 MEQ/1
40 TABLET, EXTENDED RELEASE ORAL ONCE
Status: COMPLETED | OUTPATIENT
Start: 2023-02-27 | End: 2023-02-27

## 2023-02-27 RX ORDER — NARATRIPTAN 2.5 MG/1
2.5 TABLET ORAL DAILY PRN
Status: DISCONTINUED | OUTPATIENT
Start: 2023-02-27 | End: 2023-02-27

## 2023-02-27 RX ORDER — M-VIT,TX,IRON,MINS/CALC/FOLIC 27MG-0.4MG
1 TABLET ORAL DAILY
Status: DISCONTINUED | OUTPATIENT
Start: 2023-02-28 | End: 2023-02-28 | Stop reason: HOSPADM

## 2023-02-27 RX ORDER — LEVOTHYROXINE SODIUM 0.12 MG/1
125 TABLET ORAL DAILY
Status: DISCONTINUED | OUTPATIENT
Start: 2023-02-28 | End: 2023-02-28

## 2023-02-27 RX ORDER — BUTALBITAL, ACETAMINOPHEN AND CAFFEINE 50; 325; 40 MG/1; MG/1; MG/1
1 TABLET ORAL EVERY 4 HOURS PRN
Status: DISCONTINUED | OUTPATIENT
Start: 2023-02-27 | End: 2023-02-28 | Stop reason: HOSPADM

## 2023-02-27 RX ORDER — CETIRIZINE HYDROCHLORIDE 10 MG/1
10 TABLET ORAL NIGHTLY
Status: DISCONTINUED | OUTPATIENT
Start: 2023-02-27 | End: 2023-02-28 | Stop reason: HOSPADM

## 2023-02-27 RX ORDER — ARMODAFINIL 250 MG/1
250 TABLET ORAL DAILY
Status: DISCONTINUED | OUTPATIENT
Start: 2023-02-28 | End: 2023-02-28 | Stop reason: HOSPADM

## 2023-02-27 RX ORDER — ACETAMINOPHEN 325 MG/1
650 TABLET ORAL EVERY 6 HOURS PRN
Status: DISCONTINUED | OUTPATIENT
Start: 2023-02-27 | End: 2023-02-28 | Stop reason: HOSPADM

## 2023-02-27 RX ORDER — SODIUM CHLORIDE 0.9 % (FLUSH) 0.9 %
5-40 SYRINGE (ML) INJECTION EVERY 12 HOURS SCHEDULED
Status: DISCONTINUED | OUTPATIENT
Start: 2023-02-27 | End: 2023-02-28 | Stop reason: HOSPADM

## 2023-02-27 RX ORDER — ENOXAPARIN SODIUM 100 MG/ML
30 INJECTION SUBCUTANEOUS 2 TIMES DAILY
Status: DISCONTINUED | OUTPATIENT
Start: 2023-02-27 | End: 2023-02-28 | Stop reason: HOSPADM

## 2023-02-27 RX ORDER — SODIUM CHLORIDE 0.9 % (FLUSH) 0.9 %
10 SYRINGE (ML) INJECTION PRN
Status: DISCONTINUED | OUTPATIENT
Start: 2023-02-27 | End: 2023-02-28 | Stop reason: HOSPADM

## 2023-02-27 RX ORDER — MAGNESIUM SULFATE 1 G/100ML
1000 INJECTION INTRAVENOUS PRN
Status: DISCONTINUED | OUTPATIENT
Start: 2023-02-27 | End: 2023-02-28 | Stop reason: HOSPADM

## 2023-02-27 RX ORDER — ONDANSETRON 2 MG/ML
4 INJECTION INTRAMUSCULAR; INTRAVENOUS EVERY 6 HOURS PRN
Status: DISCONTINUED | OUTPATIENT
Start: 2023-02-27 | End: 2023-02-28 | Stop reason: HOSPADM

## 2023-02-27 RX ORDER — 0.9 % SODIUM CHLORIDE 0.9 %
1000 INTRAVENOUS SOLUTION INTRAVENOUS ONCE
Status: DISCONTINUED | OUTPATIENT
Start: 2023-02-27 | End: 2023-02-27

## 2023-02-27 RX ORDER — POLYETHYLENE GLYCOL 3350 17 G/17G
17 POWDER, FOR SOLUTION ORAL DAILY PRN
Status: DISCONTINUED | OUTPATIENT
Start: 2023-02-27 | End: 2023-02-28 | Stop reason: HOSPADM

## 2023-02-27 RX ORDER — ONDANSETRON 2 MG/ML
4 INJECTION INTRAMUSCULAR; INTRAVENOUS ONCE
Status: COMPLETED | OUTPATIENT
Start: 2023-02-27 | End: 2023-02-27

## 2023-02-27 RX ORDER — POTASSIUM CHLORIDE 20 MEQ/1
40 TABLET, EXTENDED RELEASE ORAL PRN
Status: DISCONTINUED | OUTPATIENT
Start: 2023-02-27 | End: 2023-02-28 | Stop reason: HOSPADM

## 2023-02-27 RX ORDER — ONDANSETRON 4 MG/1
4 TABLET, ORALLY DISINTEGRATING ORAL EVERY 8 HOURS PRN
Status: DISCONTINUED | OUTPATIENT
Start: 2023-02-27 | End: 2023-02-28 | Stop reason: HOSPADM

## 2023-02-27 RX ORDER — FLUOXETINE HYDROCHLORIDE 20 MG/1
40 CAPSULE ORAL DAILY
Status: DISCONTINUED | OUTPATIENT
Start: 2023-02-28 | End: 2023-02-28 | Stop reason: HOSPADM

## 2023-02-27 RX ORDER — SUMATRIPTAN 50 MG/1
100 TABLET, FILM COATED ORAL DAILY PRN
Status: DISCONTINUED | OUTPATIENT
Start: 2023-02-27 | End: 2023-02-28 | Stop reason: HOSPADM

## 2023-02-27 RX ORDER — SODIUM CHLORIDE 9 MG/ML
INJECTION, SOLUTION INTRAVENOUS PRN
Status: DISCONTINUED | OUTPATIENT
Start: 2023-02-27 | End: 2023-02-28 | Stop reason: HOSPADM

## 2023-02-27 RX ORDER — POTASSIUM CHLORIDE 7.45 MG/ML
10 INJECTION INTRAVENOUS PRN
Status: DISCONTINUED | OUTPATIENT
Start: 2023-02-27 | End: 2023-02-28 | Stop reason: HOSPADM

## 2023-02-27 RX ORDER — SODIUM CHLORIDE 9 MG/ML
5 INJECTION INTRAVENOUS ONCE
Status: DISCONTINUED | OUTPATIENT
Start: 2023-02-27 | End: 2023-02-28 | Stop reason: HOSPADM

## 2023-02-27 RX ORDER — PANTOPRAZOLE SODIUM 40 MG/1
40 TABLET, DELAYED RELEASE ORAL
Status: DISCONTINUED | OUTPATIENT
Start: 2023-02-28 | End: 2023-02-28 | Stop reason: HOSPADM

## 2023-02-27 RX ORDER — SPIRONOLACTONE 25 MG/1
12.5 TABLET ORAL DAILY
Status: DISCONTINUED | OUTPATIENT
Start: 2023-02-28 | End: 2023-02-28 | Stop reason: HOSPADM

## 2023-02-27 RX ORDER — FLUOXETINE HYDROCHLORIDE 20 MG/1
20 CAPSULE ORAL DAILY
Status: DISCONTINUED | OUTPATIENT
Start: 2023-02-28 | End: 2023-02-28 | Stop reason: HOSPADM

## 2023-02-27 RX ORDER — DEXTROSE MONOHYDRATE 50 MG/ML
INJECTION, SOLUTION INTRAVENOUS CONTINUOUS
Status: DISCONTINUED | OUTPATIENT
Start: 2023-02-27 | End: 2023-02-28 | Stop reason: HOSPADM

## 2023-02-27 RX ORDER — LEVOTHYROXINE SODIUM ANHYDROUS 100 UG/5ML
200 INJECTION, POWDER, LYOPHILIZED, FOR SOLUTION INTRAVENOUS ONCE
Status: COMPLETED | OUTPATIENT
Start: 2023-02-27 | End: 2023-02-27

## 2023-02-27 RX ADMIN — SUMATRIPTAN SUCCINATE 100 MG: 50 TABLET ORAL at 23:09

## 2023-02-27 RX ADMIN — HYDROCORTISONE SODIUM SUCCINATE 100 MG: 100 INJECTION, POWDER, FOR SOLUTION INTRAMUSCULAR; INTRAVENOUS at 20:27

## 2023-02-27 RX ADMIN — ONDANSETRON 4 MG: 2 INJECTION INTRAMUSCULAR; INTRAVENOUS at 20:27

## 2023-02-27 RX ADMIN — LEVOTHYROXINE SODIUM ANHYDROUS 200 MCG: 100 INJECTION, POWDER, LYOPHILIZED, FOR SOLUTION INTRAVENOUS at 23:12

## 2023-02-27 RX ADMIN — POTASSIUM CHLORIDE 40 MEQ: 1500 TABLET, EXTENDED RELEASE ORAL at 20:27

## 2023-02-27 RX ADMIN — Medication 500 MG: at 23:08

## 2023-02-27 RX ADMIN — DEXTROSE MONOHYDRATE: 50 INJECTION, SOLUTION INTRAVENOUS at 19:24

## 2023-02-27 RX ADMIN — SODIUM CHLORIDE 1000 ML: 9 INJECTION, SOLUTION INTRAVENOUS at 19:13

## 2023-02-27 RX ADMIN — CETIRIZINE HYDROCHLORIDE 10 MG: 10 TABLET, FILM COATED ORAL at 23:19

## 2023-02-27 RX ADMIN — ENOXAPARIN SODIUM 30 MG: 100 INJECTION SUBCUTANEOUS at 23:13

## 2023-02-27 ASSESSMENT — PAIN - FUNCTIONAL ASSESSMENT: PAIN_FUNCTIONAL_ASSESSMENT: 0-10

## 2023-02-27 ASSESSMENT — PAIN SCALES - GENERAL: PAINLEVEL_OUTOF10: 7

## 2023-02-28 VITALS
RESPIRATION RATE: 17 BRPM | BODY MASS INDEX: 48.82 KG/M2 | DIASTOLIC BLOOD PRESSURE: 93 MMHG | HEART RATE: 61 BPM | OXYGEN SATURATION: 96 % | WEIGHT: 293 LBS | TEMPERATURE: 98.6 F | SYSTOLIC BLOOD PRESSURE: 127 MMHG | HEIGHT: 65 IN

## 2023-02-28 PROBLEM — E03.9 HYPOTHYROIDISM: Status: ACTIVE | Noted: 2023-02-28

## 2023-02-28 LAB
ANION GAP SERPL CALCULATED.3IONS-SCNC: 11 MMOL/L (ref 9–17)
BUN SERPL-MCNC: 6 MG/DL (ref 6–20)
CALCIUM SERPL-MCNC: 9.2 MG/DL (ref 8.6–10.4)
CHLORIDE SERPL-SCNC: 96 MMOL/L (ref 98–107)
CO2 SERPL-SCNC: 28 MMOL/L (ref 20–31)
CREAT SERPL-MCNC: 1.06 MG/DL (ref 0.5–0.9)
GFR SERPL CREATININE-BSD FRML MDRD: >60 ML/MIN/1.73M2
GLUCOSE SERPL-MCNC: 121 MG/DL (ref 70–99)
INR PPP: 1
POTASSIUM SERPL-SCNC: 4.2 MMOL/L (ref 3.7–5.3)
PROTHROMBIN TIME: 10.4 SEC (ref 9.4–12.6)
SODIUM SERPL-SCNC: 135 MMOL/L (ref 135–144)
T3FREE SERPL-MCNC: 0.56 PG/ML (ref 2.02–4.43)
T4 FREE SERPL-MCNC: 0.21 NG/DL (ref 0.93–1.7)
T4 FREE SERPL-MCNC: 0.54 NG/DL (ref 0.93–1.7)
TSH SERPL-ACNC: 45.09 UIU/ML (ref 0.3–5)

## 2023-02-28 PROCEDURE — 36415 COLL VENOUS BLD VENIPUNCTURE: CPT

## 2023-02-28 PROCEDURE — 6360000002 HC RX W HCPCS: Performed by: NURSE PRACTITIONER

## 2023-02-28 PROCEDURE — 85610 PROTHROMBIN TIME: CPT

## 2023-02-28 PROCEDURE — 84439 ASSAY OF FREE THYROXINE: CPT

## 2023-02-28 PROCEDURE — 6370000000 HC RX 637 (ALT 250 FOR IP): Performed by: NURSE PRACTITIONER

## 2023-02-28 PROCEDURE — 84443 ASSAY THYROID STIM HORMONE: CPT

## 2023-02-28 PROCEDURE — 80048 BASIC METABOLIC PNL TOTAL CA: CPT

## 2023-02-28 PROCEDURE — 99235 HOSP IP/OBS SAME DATE MOD 70: CPT | Performed by: HOSPITALIST

## 2023-02-28 PROCEDURE — 2580000003 HC RX 258: Performed by: NURSE PRACTITIONER

## 2023-02-28 PROCEDURE — 6370000000 HC RX 637 (ALT 250 FOR IP): Performed by: HOSPITALIST

## 2023-02-28 RX ORDER — LEVOTHYROXINE SODIUM 0.12 MG/1
250 TABLET ORAL DAILY
Qty: 30 TABLET | Refills: 3 | Status: SHIPPED | OUTPATIENT
Start: 2023-02-28

## 2023-02-28 RX ORDER — AMILORIDE HYDROCHLORIDE 5 MG/1
5 TABLET ORAL 2 TIMES DAILY
Qty: 60 TABLET | Refills: 11
Start: 2023-02-28 | End: 2024-02-28

## 2023-02-28 RX ORDER — LEVOTHYROXINE SODIUM 0.07 MG/1
75 TABLET ORAL 3 TIMES DAILY
Status: DISCONTINUED | OUTPATIENT
Start: 2023-02-28 | End: 2023-02-28

## 2023-02-28 RX ORDER — LEVOTHYROXINE SODIUM 0.12 MG/1
250 TABLET ORAL DAILY
Status: DISCONTINUED | OUTPATIENT
Start: 2023-02-28 | End: 2023-02-28 | Stop reason: HOSPADM

## 2023-02-28 RX ORDER — LEVOTHYROXINE SODIUM 0.07 MG/1
225 TABLET ORAL DAILY
Status: DISCONTINUED | OUTPATIENT
Start: 2023-02-28 | End: 2023-02-28

## 2023-02-28 RX ADMIN — SPIRONOLACTONE 12.5 MG: 25 TABLET, FILM COATED ORAL at 08:44

## 2023-02-28 RX ADMIN — FLUOXETINE 40 MG: 20 CAPSULE ORAL at 08:53

## 2023-02-28 RX ADMIN — SODIUM CHLORIDE, PRESERVATIVE FREE 10 ML: 5 INJECTION INTRAVENOUS at 08:48

## 2023-02-28 RX ADMIN — Medication 500 MG: at 08:46

## 2023-02-28 RX ADMIN — LEVOTHYROXINE SODIUM 250 MCG: 125 TABLET ORAL at 10:18

## 2023-02-28 RX ADMIN — ENOXAPARIN SODIUM 30 MG: 100 INJECTION SUBCUTANEOUS at 08:46

## 2023-02-28 RX ADMIN — FLUOXETINE 20 MG: 20 CAPSULE ORAL at 08:47

## 2023-02-28 RX ADMIN — MULTIPLE VITAMINS W/ MINERALS TAB 1 TABLET: TAB at 08:44

## 2023-02-28 RX ADMIN — ONDANSETRON 4 MG: 2 INJECTION INTRAMUSCULAR; INTRAVENOUS at 07:40

## 2023-02-28 RX ADMIN — PANTOPRAZOLE SODIUM 40 MG: 40 TABLET, DELAYED RELEASE ORAL at 07:25

## 2023-02-28 NOTE — ED PROVIDER NOTES
[facility] Emergency Department  [id number] [facility] [address] [facility] [address]  Phone: [phone]  Fax: [phone]        Pt Name: Sherrill Hodges  MRN: [id number]  [place] 1985  Date of evaluation: 2/27/23    [address] [place] [address]       Chief Complaint   Patient presents with    Dizziness     C/o dizziness x4 days. Reports nausea. Denies chest pain or SOB. Reports \"my thyroid doctor wanted me to come. \"        HISTORY OF PRESENT ILLNESS (Location/Symptom, Timing/Onset, Context/Setting, Quality, Duration, Modifying Factors, Severity)      Sherrill Hodges is a 42 y.o. female with pertinent PMH of thyroid cancer s/p thyroidectomy in 2017 who presents to the ED via private auto with dizziness/nausea. Patient reports that over the past 4 days she has been experiencing gradually worsening \"fogginess\" and dizziness. She is very fatigued and tired as well as nauseated. She describes a mild gradual onset frontal headache behind her eyes to which she has not taken any medication for. She contacted her endocrinologist, Dr. Srikanth Strauss who had her do labs and her TSH continues to trend up and her T3 and T4 are \"undetectable. \"  They contacted her today during office hours because she missed her appointment today and advised her to go to the ED to be evaluated as she was having slow speech and difficulty thinking while talking to them on the phone. Sister is bedside and adds additional history that the patient had a gastric bypass surgery that did not work and they reversed it but now she has a \"flipped\" stomach which is causing her to not absorb her medications. Patient states she takes her medications faithfully and has not missed any doses of her levothyroxine. She has exacerbation of her symptoms when she is walking around or when she closes her eyes. Denies noticing any alleviating factors other than resting.   Denies any fever, chills, cough, congestion, sore throat, chest pain, shortness of breath, lightheadedness, syncope, abdominal pain, vomiting, diarrhea, flank pain, urinary complaints, or any other concerns at this time. PAST MEDICAL / SURGICAL / SOCIAL / FAMILY HISTORY     PMH:  has a past medical history of Arrhythmia, Depression, GERD (gastroesophageal reflux disease), Hyperthyroidism, Migraine, Neurologic cardiac syncope, and Thyroid cancer ([facility] [address] Surgical History:  has a past surgical history that includes Gallbladder surgery; Appendectomy; Thyroidectomy (Bilateral);  section; Breast lumpectomy (Left); Gastric bypass surgery; and Demetria-en-Y Gastric Bypass (N/A). Social History:  reports that she has never smoked. She has never used smokeless tobacco. She reports that she does not drink alcohol and does not use drugs. Family History: She indicated that her mother is alive. She indicated that her father is alive. She indicated that both of her sisters are alive. She indicated that her brother is alive. She indicated that the status of her paternal grandfather is unknown. She indicated that the status of her maternal cousin is unknown.   family history includes Breast Cancer in her maternal cousin; Heart Disease in her father; High Blood Pressure in her father; Migraines in her mother and sister; No Known Problems in her brother and sister; Other (age of onset: 43) in her mother; Thyroid Cancer in her maternal cousin; Thyroid Disease in her paternal grandfather. Psychiatric History: None    Allergies: Amoxicillin-pot clavulanate, Amoxil [amoxicillin], Cefdinir, Frovatriptan, Metoclopramide, Morphine, Nsaids, Shrimp (diagnostic), and Shrimp extract allergy skin test    Home Medications:   Prior to Admission medications    Medication Sig Start Date End Date Taking? Authorizing Provider   fluocinonide (LIDEX) 0.05 % external solution Apply topically 2 times daily Apply topically 2 times daily.  22   VIVIEN Rizo CNP   fluocinonide (LIDEX) 0.05 % cream Apply topically 2 times daily Apply topically 2 times daily.  12/19/22   VIVIEN Iglesias CNP   lansoprazole (PREVACID) 30 MG delayed release capsule Take 1 capsule by mouth daily 12/19/22 12/19/23  VIVIEN Iglesias CNP   FLUoxetine (PROZAC) 20 MG capsule Take 1 capsule by mouth daily 12/19/22 3/19/23  VIVIEN Iglesias CNP   hydrOXYzine HCl (ATARAX) 25 MG tablet Take 1 tablet by mouth every 8 hours as needed for Itching 12/19/22 12/19/23  VIVIEN Iglesias CNP   vitamin D (ERGOCALCIFEROL) 1.25 MG (88405 UT) CAPS capsule Take 1 capsule by mouth once a week 12/19/22 12/19/23  VIVIEN Iglesias CNP   ondansetron (ZOFRAN-ODT) 4 MG disintegrating tablet DISSOLVE ONE TABLET BY MOUTH EVERY 8 HOURS AS NEEDED FOR NAUSEA OR VOMITING 10/17/22   VIVIEN Iglesias CNP   potassium chloride (KLOR-CON) 20 MEQ packet Take 20 mEq by mouth 3 times daily    Historical Provider, MD   NP THYROID 120 MG tablet  5/15/22   Historical Provider, MD   FLUoxetine (PROZAC) 40 MG capsule Take 1 capsule by mouth daily With 1 capsule of 20mg, for total daily dose of 60mg 5/10/22 12/19/23  VIVIEN Keating CNP   Atogepant (QULIPTA) 30 MG TABS Take 60 mg by mouth     Historical Provider, MD   levothyroxine (SYNTHROID) 100 MCG tablet Take 1 tablet by mouth daily 11/5/21   VIVIEN Batista NP   Multiple Vitamins-Minerals (THERAPEUTIC MULTIVITAMIN-MINERALS) tablet Take 1 tablet by mouth daily    Historical Provider, MD   aMILoride (MIDAMOR) 5 MG tablet Take 1 tablet by mouth daily  Patient taking differently: Take 5 mg by mouth in the morning and at bedtime 5/12/21 5/26/23  VIVIEN Iglesias CNP   azelastine (ASTELIN) 0.1 % nasal spray 2 sprays by Nasal route 2 times daily 12/14/20   Historical Provider, MD   butalbital-acetaminophen-caffeine (FIORICET, ESGIC) -40 MG per tablet Take by mouth daily as needed 1/4/21   Historical Provider, MD   naratriptan (AMERGE) 2.5 MG tablet Take by mouth daily as needed 12/15/20   Historical Provider, MD   UBRELVY 50 MG TABS Take 100 mg by mouth daily as needed for Migraine  12/15/20   Historical Provider, MD   cetirizine (ZYRTEC) 10 MG tablet Take by mouth nightly  6/25/20   Historical Provider, MD   SUMAtriptan Succinate 6 MG/0.5ML SOAJ Inject as directed as needed  6/18/20   Historical Provider, MD   Armodafinil 250 MG TABS Take by mouth daily. Historical Provider, MD   pantoprazole (PROTONIX) 40 MG tablet Take 40 mg by mouth nightly     Historical Provider, MD   Vitamin D (CHOLECALCIFEROL) 1000 UNITS CAPS capsule Take 4,000 Units by mouth 2 times daily    Historical Provider, MD   Magnesium Oxide 500 MG TABS Take 500 mg by mouth 2 times daily    Historical Provider, MD       REVIEW OF SYSTEMS  (2-9 systems for level 4, 10 ormore for level 5)      Review of Systems  See HPI. PHYSICAL EXAM  (up to 7 for level 4, 8 or more for level 5)      INITIAL VITALS:  height is 5' 5\" (1.651 m) and weight is 131.5 kg (290 lb). Her oral temperature is 98.5 °F (36.9 °C). Her blood pressure is 145/90 (abnormal) and her pulse is 65. Her respiration is 16 and oxygen saturation is 95%. Vital signs reviewed. Physical Exam    General:  Alert but slightly fatigued, cooperative, well-groomed, well-nourished, appears stated age, and is in no acute distress. Head:  Normocephalic, atraumatic, and without obvious abnormality. Eyes:  Sclerae/conjunctivae clear without injection, pallor, or icterus. Corneas clear without opacities. EOM's intact. ENT: Ears and nose are all without obvious masses lesion or deformity. Neck: Supple and symmetrical. Trachea midline. No jugular venous distention. Lungs:   No respiratory distress. Clear to auscultation bilaterally. No wheezes, rhonchi, or rales. Heart:  Regular rate. Regular rhythm. No murmurs, rubs, or gallops. Abdomen:   Normoactive bowel sounds. Soft, nontender, nondistended without guarding or rebound.  No palpable masses. Extremities: Warm and dry without erythema or edema. Skin: Soft, good turgor, and well-hydrated. Neurologic: GCS 15. Normal and symmetric muscle bulk and tone throughout. Strength is 5/5 throughout. Sensation intact to light touch throughout. Gait with normal base. Good speed, accuracy and rhythm of CHELO, FTN, and HTS. No pronator drift. Normal Romberg test. DTRs 2+. CN I and VIII and sensory of CN VII & IX not assessed. Cranial Nerves:   II-VI: Visual fields intact, PERRL EOMI, no nystagmus or strabismus  V: Facial sensation intact to light touch. Good ROM of jaw. VII: Facial muscles symmetrical, no weakness  IX-X: Uvula rises symmetrically, no hoarseness, clearly enunciates words  XI: Trapezii muscles strong and symmetrical  XII: No asymmetry, fasciculations or deviation of the tongue   Psychiatric: Normal mood and affect. Normal behavior. Coherent thought process. DIFFERENTIAL DIAGNOSIS / MDM     Patient presents to the emergency department for the complaint as described above. Vital signs are stable. Physical exam demonstrates a fatigued appearing but nontoxic and nonseptic appearing female in no acute distress. Lungs are clear to auscultation. Heart rate and rhythm are regular. Abdomen is soft and nontender. She is neurologically intact with an NIH of 0. I do not feel that this is CVA related in nature and suspect it is likely associated with her elevated TSH. Reviewing her previous TSHs, she has been trending up over the past 2 months. She has I will obtain pertinent labs, EKG, check sugar, and start some fluids and continue to monitor. Declined anything for nausea or headache. We will likely plan for admission. This patient was seen by the attending physician and they agreed with the assessment and plan.     PLAN (LABS / IMAGING / EKG):  Orders Placed This Encounter   Procedures    XR CHEST PORTABLE    TSH    CBC with Auto Differential    CMP    Troponin    T4, Free    T3, Free    CK    Venous Blood Gas, POC    POC Glucose Fingerstick    EKG 12 Lead    Saline lock IV    ADMIT TO INPATIENT       MEDICATIONS ORDERED:  Orders Placed This Encounter   Medications    DISCONTD: 0.9 % sodium chloride bolus    dextrose 5 % solution    AND Linked Order Group     levothyroxine (SYNTHROID) injection 500 mcg      Order Specific Question:   Is levothyroxine IV being used to treat myxedema coma? Answer:   No     sodium chloride (PF) 0.9 % injection 5 mL    hydrocortisone sodium succinate PF (SOLU-CORTEF) injection 100 mg    ondansetron (ZOFRAN) injection 4 mg    potassium chloride (KLOR-CON M) extended release tablet 40 mEq       Controlled Substances Monitoring:     DIAGNOSTIC RESULTS     EKG: All EKG's are interpreted by the Emergency Department Physician who either signs or Co-signs this chart in the [address] a cardiologist.    See ED attending note    RADIOLOGY: All images are read by the radiologist and their interpretations are reviewed. XR CHEST PORTABLE    Result Date: 2/27/2023  EXAMINATION: ONE XRAY VIEW OF THE CHEST 2/27/2023 7:26 pm COMPARISON: None. HISTORY: ORDERING SYSTEM PROVIDED HISTORY: Fatigue, Hypothyroidism TECHNOLOGIST PROVIDED HISTORY: Fatigue, Hypothyroidism Reason for Exam: dizziness FINDINGS: No infiltrate or consolidation or effusion is identified. The heart size is normal.  There is bandlike atelectasis versus scar at the left lung base. No acute abnormality visualized.        LABS:  Results for orders placed or performed during the hospital encounter of 02/27/23   TSH   Result Value Ref Range    TSH 90.51 (H) 0.30 - 5.00 uIU/mL   CBC with Auto Differential   Result Value Ref Range    WBC 6.8 3.5 - 11.0 k/uL    RBC 4.52 4.0 - 5.2 m/uL    Hemoglobin 13.3 12.0 - 16.0 g/dL    Hematocrit 39.5 36 - 46 %    MCV 87.4 80 - 100 fL    MCH 29.4 26 - 34 pg    MCHC 33.6 31 - 37 g/dL    RDW 14.8 12.5 - 15.4 %    Platelets 259 125 - 437 k/uL    MPV 6.5 6.0 - 12.0 fL    Seg Neutrophils 64 36 - 66 %    Lymphocytes 24 24 - 44 %    Monocytes 6 2 - 11 %    Eosinophils % 5 (H) 1 - 4 %    Basophils 1 0 - 2 %    Segs Absolute 4.40 1.8 - 7.7 k/uL    Absolute Lymph # 1.70 1.0 - 4.8 k/uL    Absolute Mono # 0.40 0.1 - 1.2 k/uL    Absolute Eos # 0.30 0.0 - 0.4 k/uL    Basophils Absolute 0.10 0.0 - 0.2 k/uL   CMP   Result Value Ref Range    Glucose 89 70 - 99 mg/dL    BUN 8 6 - 20 mg/dL    Creatinine 1.03 (H) 0.50 - 0.90 mg/dL    Est, Glom Filt Rate >60 >60 mL/min/1.73m2    Calcium 9.2 8.6 - 10.4 mg/dL    Sodium 137 135 - 144 mmol/L    Potassium 3.3 (L) 3.7 - 5.3 mmol/L    Chloride 95 (L) 98 - 107 mmol/L    CO2 30 20 - 31 mmol/L    Anion Gap 12 9 - 17 mmol/L    Alkaline Phosphatase 85 35 - 104 U/L    ALT 8 5 - 33 U/L    AST 24 <32 U/L    Total Bilirubin 0.3 0.3 - 1.2 mg/dL    Total Protein 7.3 6.4 - 8.3 g/dL    Albumin 4.3 3.5 - 5.2 g/dL    Albumin/Globulin Ratio 1.4 1.0 - 2.5   Troponin   Result Value Ref Range    Troponin, High Sensitivity 10 0 - 14 ng/L   CK   Result Value Ref Range    Total  (H) 26 - 192 U/L   Venous Blood Gas, POC   Result Value Ref Range    pH, Fabian 7.415 7.320 - 7.430    pCO2, Fabian 46.8 41.0 - 51.0 mm Hg    pO2, Fabian 36.0 30.0 - 50.0 mm Hg    HCO3, Venous 30.0 (H) 22.0 - 29.0 mmol/L    Positive Base Excess, Fabian 5 (H) 0.0 - 3.0    O2 Sat, Fabian 69 60.0 - 85.0 %   POC Glucose Fingerstick   Result Value Ref Range    POC Glucose 88 65 - 105 mg/dL   EKG 12 Lead   Result Value Ref Range    Ventricular Rate 65 BPM    Atrial Rate 65 BPM    P-R Interval 174 ms    QRS Duration 100 ms    Q-T Interval 424 ms    QTc Calculation (Bazett) 440 ms    P Axis 17 degrees    R Axis -18 degrees    T Axis 166 degrees       Leslie Diaz 94 COURSE     ED Course as of 02/27/23 2100 Mon Feb 27, 2023 2007 CBC is unremarkable. CMP demonstrates a glucose of 89 and hypokalemia at 3.3 and slight bump in creatinine.   Patient did just eat so concerning that the glucose is low and she is also dry so we started fluids and did start D50. Troponin is normal.  CK is noted be elevated at 309 and TSH is notably elevated at 90.51 which has significantly increased over the past 1 month. VBG does not demonstrate any significant hypercapnia. We will replace the potassium as well. [MG]   2015 I attempted to contact her endocrinologist office but they do not have anybody on call. I then attempted to check with our endocrinologist however she is only pediatric and we do not have any adult endocrinologist on staff. I ordered levothyroxine and hydrocortisone to start and we will plan for admission. Patient updated regarding results and reported feeling nauseated so we will give her some Zofran. [MG]   2032 Dr. Norberto Warren spoke with Merline Berry, NP regarding the patient and they will accept the admission. I did consult access but they do not have any additional information regarding endocrinology consultations available. Will get her admitted and they can consult Dr. Michaela Win tomorrow. [MG]   2043 Spoke with pharmacy regarding the levothyroxine as we do not have it at [facility]. They will send it over from [facility]. Also recommended a lower dose to start at this time. [MG]   2050 Pharmacy said we have 200mcg in our medsurg B so recommended dosing that here and then he will bring additional levothyroxine later to [facility] but feels that 200mcg is an appropriate dose this evening. [MG]      ED Course User Index  [MG] Maribeth Pennington PA-C        Vitals:    Vitals:    02/27/23 1835 02/27/23 1957   BP: (!) 147/99 (!) 145/90   Pulse: 68 65   Resp: 16 16   Temp: 98.5 °F (36.9 °C)    TempSrc: Oral    SpO2: 97% 95%   Weight: 131.5 kg (290 lb)    Height: 5' 5\" (1.651 m)      -------------------------  BP: (!) 145/90, Temp: 98.5 °F (36.9 °C), Heart Rate: 65, Resp: 16      RE-EVALUATION:  See ED course    CONSULTS:  See ED course    PROCEDURES:  None    FINAL IMPRESSION      1. Hypothyroidism, unspecified type    2. Hypokalemia    3. Hypoglycemia    4. Dehydration          DISPOSITION / PLAN     CONDITION ON DISPOSITION:   Good / Stable for admission    PATIENT REFERRED TO:  No follow-up provider specified.     DISCHARGE MEDICATIONS:  New Prescriptions    No medications on file       Loyal Runner, [place]   Emergency Medicine Physician Assistant    (Please note that portions of this note were completed with a voice recognition program.  Efforts were made to edit the dictations but occasionally words aremis-transcribed.)        Loyal Runner, PA-C  02/27/23 2100

## 2023-02-28 NOTE — PLAN OF CARE
Problem: Discharge Planning  Goal: Discharge to home or other facility with appropriate resources  2/28/2023 1021 by Byron Slater RN  Outcome: Completed  2/28/2023 0101 by Diann Cary RN  Outcome: Progressing     Problem: Pain  Goal: Verbalizes/displays adequate comfort level or baseline comfort level  2/28/2023 1021 by Byron Slater RN  Outcome: Completed  2/28/2023 0101 by Diann Cary RN  Outcome: Progressing  Pain scale preformed per protocol and pt treated for pain as documented. Education given.        Problem: Safety - Adult  Goal: Free from fall injury  2/28/2023 1021 by Byron Slater RN  Outcome: Completed  2/28/2023 0101 by Diann Cary RN  Outcome: Progressing

## 2023-02-28 NOTE — DISCHARGE SUMMARY
[facility]  Office: [address], DO, Amandojimmy Whatley, DO, Maya Angeles, DO, Enriquetaileana Dozier, DO, Freedom Mendez MD, Lawanda Crisostomo MD, Shantel Linda MD, Chris Britt MD,  Ora Lundberg MD, Narcisa Matos MD, Atif Saunders DO, Felecia Lynn MD,  Abilio Christie MD, German Forman MD, Nahomi Gonzales DO, Christina Sprague MD, Jordy Munoz MD, Manuel Christianson DO, Rc Sarkar MD, Daniel Byrne MD, Guero Tamez MD, Fransisco Hobbs MD, Dimple Argueta DO, Reggie Jovel MD, Clive Ross MD, Tracee Arriaga, CNP,  Evans Fregoso, CNP, Jad Spivey, CNP, Nely Ybarra, CNP,  Chinyere Westfall, DNP, Patricia Palafox, CNP, Maribel Medina, CNP, Sapna Fernando, CNP, Umair Rivero, CNP, Anthony Marshall, CNP, Nena Loyola PA-C, Deanna Max, CNS, Cindy Salazar, CNP, Reginaldo Atwood, CNP         [facility]    Discharge Summary     Patient ID: Sheila Vance  :  1985   MRN: [id number]     ACCOUNT:  [de-identified]   Patient's PCP: VIVIEN Reyez CNP  Admit Date: 2023   Discharge Date: 2023  Length of Stay: 1  Code Status:  Full Code  Admitting Physician: Bj Estes DO  Discharge Physician: Bj Estes DO     Active Discharge Diagnoses:     Hospital Problem Lists:  Principal Problem:    Dizziness  Active Problems:    Hypothyroidism  Resolved Problems:    * No resolved hospital problems. *      Admission Condition:  fair     Discharged Condition: good    Hospital Stay:     Hospital Course:  Sheila Vance is a 42 y.o. female who was admitted for the management of Dizziness , presented to ER with Dizziness (C/o dizziness x4 days. Reports nausea. Denies chest pain or SOB. Reports \"my thyroid doctor wanted me to come. \" )    This very pleasant 49-year-old female presents to the hospital with nausea, dizziness and abnormal thyroid testing.   She was told by her endocrinologist to come to the hospital due to the fact that her T3/T4 were both low with an elevated TSH. The patient has a history of thyroid cancer and underwent thyroidectomy. The patient is presently maintained on NP Thyroid in addition to Synthroid. Unfortunately the patient has significant issues with absorption kinetics due to history of multiple abdominal surgeries. She has had a Demetria-en-Y with subsequent reversal.  She tells me that she is scheduled at the end of March for correction of a stomach abnormality at [facility]. We did have a long discussion regarding her upcoming surgery. She was hopeful that because of her thyroid abnormalities at the surgery could be moved forward. I did explain to her that this would have to be discussed with her primary surgeon who is at [facility]. Multiple questions answered regarding this. At this point in time the patient received 200 mcg of Synthroid along with fluid resuscitation. Her TSH has improved dramatically. I do recommend that she undergo repeat thyroid testing in the next few days. She has an appointment with her endocrinologist on the second of this month. Due to her issues with absorption it is reasonable to increase her Synthroid aggressively. We will increase her to 250 mcg daily. Until she undergoes her surgery it may be reasonable for endocrinology to arrange outpatient Synthroid administration through the infusion clinic if she continues to have difficulty maintaining appropriate levels of TSH, T3, T4. At this point in time she feels well and much of her symptomology is resolved. She is hemodynamically stable for discharge. I did encourage her that should she have recurrent problems and she wishes to see her surgeon that she present to [facility] as her surgeon does not attend here. She denies any questions or concerns regarding this.     Significant therapeutic interventions: As above    Significant Diagnostic Studies:   Labs:  Hematology:  Recent Labs     02/27/23 1910 02/28/23  0508   WBC 6.8  --    RBC 4.52  --    HGB 13.3  --    HCT 39.5  --    MCV 87.4  --    MCH 29.4  --    MCHC 33.6  --    RDW 14.8  --      --    MPV 6.5  --    INR  --  1.0     Chemistry:  Recent Labs     02/27/23 1910 02/28/23  0508    135   K 3.3* 4.2   CL 95* 96*   CO2 30 28   GLUCOSE 89 121*   BUN 8 6   CREATININE 1.03* 1.06*   ANIONGAP 12 11   LABGLOM >60 >60   CALCIUM 9.2 9.2   TROPHS 10  --    CKTOTAL 309*  --      Recent Labs     02/27/23 1910 02/27/23 1916 02/28/23  0508   PROT 7.3  --   --    LABALBU 4.3  --   --    TSH 90.51*  --  45.09*   AST 24  --   --    ALT 8  --   --    ALKPHOS 85  --   --    BILITOT 0.3  --   --    POCGLU  --  88  --      ABG:No results found for: POCPH, PHART, PH, POCPCO2, GUF0FNP, PCO2, POCPO2, PO2ART, PO2, POCHCO3, HBV0BKG, HCO3, NBEA, PBEA, BEART, BE, THGBART, THB, TUK8TWV, JUYF8XRB, N1VTOPFO, O2SAT, FIO2  Lab Results   Component Value Date/Time    SPECIAL NOT REPORTED 11/05/2019 12:22 PM     Lab Results   Component Value Date/Time    CULTURE NO SIGNIFICANT GROWTH 11/05/2019 12:22 PM       Radiology:  XR CHEST PORTABLE    Result Date: 2/27/2023  No acute abnormality visualized. Consultations:    Consults:     Final Specialist Recommendations/Findings:   None      The patient was seen and examined on day of discharge and this discharge summary is in conjunction with any daily progress note from day of discharge. Discharge plan:     Disposition: Home    Physician Follow Up:   VIVIEN Hogan - AMANDA Haywood [address] [facility]  [phone]    Follow up      Gasper Lance MD  [place] [address] [address]  [phone]    Follow up       Requiring Further Evaluation/Follow Up POST HOSPITALIZATION/Incidental Findings:  Follow-up with endocrinology on March 2 for repeat thyroid testing    Diet: regular diet    Activity: As tolerated    Instructions to Patient: Follow-up with surgery as instructed    Discharge Medications:      Medication List        CHANGE how you take these medications      levothyroxine 125 MCG tablet  Commonly known as: SYNTHROID  Take 2 tablets by mouth Daily  What changed:   medication strength  how much to take  when to take this            CONTINUE taking these medications      aMILoride 5 MG tablet  Commonly known as: MIDAMOR  Take 1 tablet by mouth in the morning and at bedtime     Armodafinil 250 MG Tabs     azelastine 0.1 % nasal spray  Commonly known as: ASTELIN     butalbital-acetaminophen-caffeine -40 MG per tablet  Commonly known as: FIORICET, ESGIC     cetirizine 10 MG tablet  Commonly known as: ZYRTEC     * fluocinonide 0.05 % external solution  Commonly known as: LIDEX  Apply topically 2 times daily Apply topically 2 times daily. * fluocinonide 0.05 % cream  Commonly known as: LIDEX  Apply topically 2 times daily Apply topically 2 times daily.      * FLUoxetine 40 MG capsule  Commonly known as: PROzac  Take 1 capsule by mouth daily With 1 capsule of 20mg, for total daily dose of 60mg     * FLUoxetine 20 MG capsule  Commonly known as: PROZAC  Take 1 capsule by mouth daily     hydrOXYzine HCl 25 MG tablet  Commonly known as: ATARAX  Take 1 tablet by mouth every 8 hours as needed for Itching     lansoprazole 30 MG delayed release capsule  Commonly known as: PREVACID  Take 1 capsule by mouth daily     Magnesium Oxide 500 MG Tabs     naratriptan 2.5 MG tablet  Commonly known as: AMERGE     NP Thyroid 120 MG tablet  Generic drug: thyroid     ondansetron 4 MG disintegrating tablet  Commonly known as: ZOFRAN-ODT  DISSOLVE ONE TABLET BY MOUTH EVERY 8 HOURS AS NEEDED FOR NAUSEA OR VOMITING     pantoprazole 40 MG tablet  Commonly known as: PROTONIX     potassium chloride 20 MEQ packet  Commonly known as: KLOR-CON     Qulipta 30 MG Tabs  Generic drug: Atogepant     SUMAtriptan Succinate 6 MG/0.5ML Soaj     therapeutic multivitamin-minerals tablet     Ubrelvy 50 MG Tabs  Generic drug: Ubrogepant     vitamin D 1.25 MG (66029 UT) Caps capsule  Commonly known as: ERGOCALCIFEROL  Take 1 capsule by mouth once a week     Vitamin D 1000 units Caps capsule  Commonly known as: CHOLECALCIFEROL           * This list has 4 medication(s) that are the same as other medications prescribed for you. Read the directions carefully, and ask your doctor or other care provider to review them with you. Where to Get Your Medications        These medications were sent to [address] [place] [address], [address] [place] [id number]      Phone: [phone]   levothyroxine 125 MCG tablet       Information about where to get these medications is not yet available    Ask your nurse or doctor about these medications  aMILoride 5 MG tablet         Time spent on discharge is 20 mins in patient examination, evaluation, counseling as well as medication reconciliation, prescriptions for required medications, discharge plan and follow up. Electronically signed by   Emir Ramirez DO  2/28/2023  11:31 AM      Thank you Dr. Marylee Apley, VIVIEN - AMANDA for the opportunity to be involved in this patient's care.

## 2023-02-28 NOTE — H&P
[facility]  Office: [address], DO, Cinda Jorge, DO, Glenda Arndt, DO, Richard Misael Dozier, DO, Goyo Pascal MD, Nelson Morales MD, Norberto Amaya MD, Acacia Caballero MD,  Anjum Mustafa MD, Analia Reina MD, Justin Reina DO, Katrin Grey MD,  Delia Mike MD, Trae Valdez MD, Jorge Maria DO, Pepito Gabriel MD, Shukri Jasmine MD, Bobby Berrios DO, Violet Shabazz MD, Benitez Trinh MD, Faith Summers MD, Iliana Shelton MD, Andrena Jeans, DO, Malu Orellana MD, Patel Bustillos MD, Shady Rivera, CNP,  Abimbola Rivas, CNP, Wilfrid Benjamin, CNP, Nan Hernandez, CNP,  Brittni Nugent, [facility], Cesario Song, CNP, Harmony Gama, CNP, Rolando Vincent, CNP, Lennox Portela, CNP, Gudelia Thompson, CNP, Claudia Gilbert PAJohnieC, Zenaida Menjivar, CNS, Reyes Cheek, CNP, Shaneka Crum CNP         [address]    HISTORY AND PHYSICAL EXAMINATION            Date:   2/28/2023  Patient name:  Myrna Yarbrough  Date of admission:  2/27/2023  6:29 PM  MRN:   [id number]  Account:  [de-identified]  YOB: 1985  PCP:    VIVIEN Monterroso CNP  Room:   331/331-01  Code Status:    Full Code      History Obtained From:     patient, electronic medical record    History of Present Illness:     Myrna Yarbrough is a 42 y.o. Non- / non  female who presents with Dizziness (C/o dizziness x4 days. Reports nausea. Denies chest pain or SOB. Reports \"my thyroid doctor wanted me to come. \" )   and is admitted to the hospital for the management of Dizziness. This very pleasant 55-year-old female presents to the hospital with nausea, dizziness and abnormal thyroid testing. She was told by her endocrinologist to come to the hospital due to the fact that her T3/T4 were both low with an elevated TSH. The patient has a history of thyroid cancer and underwent thyroidectomy.   The patient is presently maintained on NP Thyroid in addition to Synthroid. Unfortunately the patient has significant issues with absorption kinetics due to history of multiple abdominal surgeries. She has had a Demetria-en-Y with subsequent reversal.  She tells me that she is scheduled at the end of March for correction of a stomach abnormality at [facility]. We did have a long discussion regarding her upcoming surgery. She was hopeful that because of her thyroid abnormalities at the surgery could be moved forward. I did explain to her that this would have to be discussed with her primary surgeon who is at [facility]. Multiple questions answered regarding this. At this point in time the patient received 200 mcg of Synthroid along with fluid resuscitation. Her TSH has improved dramatically. I do recommend that she undergo repeat thyroid testing in the next few days. She has an appointment with her endocrinologist on the second of this month. Due to her issues with absorption it is reasonable to increase her Synthroid aggressively. We will increase her to 250 mcg daily. Until she undergoes her surgery it may be reasonable for endocrinology to arrange outpatient Synthroid administration through the infusion clinic if she continues to have difficulty maintaining appropriate levels of TSH, T3, T4. At this point in time she feels well and much of her symptomology is resolved. She is hemodynamically stable for discharge. I did encourage her that should she have recurrent problems and she wishes to see her surgeon that she present to [facility] as her surgeon does not attend here. She denies any questions or concerns regarding this.     Past Medical History:     Past Medical History:   Diagnosis Date    Arrhythmia 4/7/2011    Depression 4/7/2011    GERD (gastroesophageal reflux disease)     Gastoric presis     Hyperthyroidism     Migraine 4/7/2011    Neurologic cardiac syncope 4/7/2011    Thyroid cancer [facility])         Past Surgical History:     Past Surgical History:   Procedure Laterality Date    APPENDECTOMY      BREAST LUMPECTOMY Left     5 lumps removed out of left breast      SECTION      2 C- Sections     GALLBLADDER SURGERY      GASTRIC BYPASS SURGERY      MICHELLE-EN-Y GASTRIC BYPASS N/A     Patient had to get gastric bypass reversed due to Ulcers removed     THYROIDECTOMY Bilateral     whole thyroid removed         Medications Prior to Admission:     Prior to Admission medications    Medication Sig Start Date End Date Taking? Authorizing Provider   aMILoride (MIDAMOR) 5 MG tablet Take 1 tablet by mouth in the morning and at bedtime 23 Yes Romina Spain, DO   levothyroxine (SYNTHROID) 125 MCG tablet Take 2 tablets by mouth Daily 23  Yes Crystal Jenkins, DO   fluocinonide (LIDEX) 0.05 % external solution Apply topically 2 times daily Apply topically 2 times daily. 22   VIVIEN Varela CNP   fluocinonide (LIDEX) 0.05 % cream Apply topically 2 times daily Apply topically 2 times daily.  22   VIVIEN Varela CNP   lansoprazole (PREVACID) 30 MG delayed release capsule Take 1 capsule by mouth daily 22  VIVIEN Varela CNP   FLUoxetine (PROZAC) 20 MG capsule Take 1 capsule by mouth daily 12/19/22 3/19/23  VIVIEN Varela CNP   hydrOXYzine HCl (ATARAX) 25 MG tablet Take 1 tablet by mouth every 8 hours as needed for Itching 22  VIVIEN Varela CNP   vitamin D (ERGOCALCIFEROL) 1.25 MG (01020 UT) CAPS capsule Take 1 capsule by mouth once a week 22  VIVIEN Varela CNP   ondansetron (ZOFRAN-ODT) 4 MG disintegrating tablet DISSOLVE ONE TABLET BY MOUTH EVERY 8 HOURS AS NEEDED FOR NAUSEA OR VOMITING 10/17/22   VIVIEN Varela CNP   potassium chloride (KLOR-CON) 20 MEQ packet Take 20 mEq by mouth 3 times daily    Historical Provider, MD   NP THYROID 120 MG tablet  5/15/22   Historical Provider, MD   FLUoxetine (PROZAC) 40 MG capsule Take 1 capsule by mouth daily With 1 capsule of 20mg, for total daily dose of 60mg 5/10/22 12/19/23  VIVIEN Lang - CNP   Atogepant (QULIPTA) 30 MG TABS Take 60 mg by mouth     Historical Provider, MD   Multiple Vitamins-Minerals (THERAPEUTIC MULTIVITAMIN-MINERALS) tablet Take 1 tablet by mouth daily    Historical Provider, MD   azelastine (ASTELIN) 0.1 % nasal spray 2 sprays by Nasal route 2 times daily 20   Historical Provider, MD   butalbital-acetaminophen-caffeine (FIORICET, ESGIC) -40 MG per tablet Take by mouth daily as needed 21   Historical Provider, MD   naratriptan (AMERGE) 2.5 MG tablet Take by mouth daily as needed 12/15/20   Historical Provider, MD   UBRELVY 50 MG TABS Take 100 mg by mouth daily as needed for Migraine  12/15/20   Historical Provider, MD   cetirizine (ZYRTEC) 10 MG tablet Take by mouth nightly  20   Historical Provider, MD   SUMAtriptan Succinate 6 MG/0.5ML SOAJ Inject as directed as needed  20   Historical Provider, MD   Armodafinil 250 MG TABS Take by mouth daily.     Historical Provider, MD   pantoprazole (PROTONIX) 40 MG tablet Take 40 mg by mouth nightly     Historical Provider, MD   Vitamin D (CHOLECALCIFEROL) 1000 UNITS CAPS capsule Take 4,000 Units by mouth 2 times daily    Historical Provider, MD   Magnesium Oxide 500 MG TABS Take 500 mg by mouth 2 times daily    Historical Provider, MD        Allergies:     Amoxicillin-pot clavulanate, Amoxil [amoxicillin], Cefdinir, Frovatriptan, Metoclopramide, Morphine, Nsaids, Shrimp (diagnostic), and Shrimp extract allergy skin test    Physical Exam:   BP (!) 127/93   Pulse 61   Temp 98.6 °F (37 °C) (Oral)   Resp 17   Ht 5' 5\" (1.651 m)   Wt (!) 308 lb 3.3 oz (139.8 kg)   LMP 2023   SpO2 96%   BMI 51.29 kg/m²   Temp (24hrs), Av.5 °F (36.9 °C), Min:98.4 °F (36.9 °C), Max:98.6 °F (37 °C)    Recent Labs     23   POCGLU 88       Intake/Output Summary (Last 24 hours) at 2023 1121  Last data filed at 2/27/2023 1924  Gross per 24 hour   Intake 0 ml   Output --   Net 0 ml       General Appearance:  alert, well appearing, and in no acute distress  Mental status: oriented to person, place, and time  Head:  normocephalic, atraumatic  Eye: no icterus, redness, pupils equal and reactive, extraocular eye movements intact, conjunctiva clear  Ear: normal external ear, no discharge, hearing intact  Nose:  no drainage noted  Mouth: mucous membranes moist  Neck: supple, no carotid bruits, thyroid not palpable  Lungs: Bilateral equal air entry, clear to ausculation, no wheezing, rales or rhonchi, normal effort  Cardiovascular: normal rate, regular rhythm, no murmur, gallop, rub  Abdomen: Soft, nontender, nondistended, normal bowel sounds, no hepatomegaly or splenomegaly  Neurologic: There are no new focal motor or sensory deficits, normal muscle tone and bulk, no abnormal sensation, normal speech, cranial nerves II through XII grossly intact  Skin: No gross lesions, rashes, bruising or bleeding on exposed skin area  Extremities:  peripheral pulses palpable, no pedal edema or calf pain with palpation  Psych: normal affect     Investigations:      Laboratory Testing:  Recent Results (from the past 24 hour(s))   TSH    Collection Time: 02/27/23  7:10 PM   Result Value Ref Range    TSH 90.51 (H) 0.30 - 5.00 uIU/mL   CBC with Auto Differential    Collection Time: 02/27/23  7:10 PM   Result Value Ref Range    WBC 6.8 3.5 - 11.0 k/uL    RBC 4.52 4.0 - 5.2 m/uL    Hemoglobin 13.3 12.0 - 16.0 g/dL    Hematocrit 39.5 36 - 46 %    MCV 87.4 80 - 100 fL    MCH 29.4 26 - 34 pg    MCHC 33.6 31 - 37 g/dL    RDW 14.8 12.5 - 15.4 %    Platelets 495 455 - 162 k/uL    MPV 6.5 6.0 - 12.0 fL    Seg Neutrophils 64 36 - 66 %    Lymphocytes 24 24 - 44 %    Monocytes 6 2 - 11 %    Eosinophils % 5 (H) 1 - 4 %    Basophils 1 0 - 2 %    Segs Absolute 4.40 1.8 - 7.7 k/uL    Absolute Lymph # 1.70 1.0 - 4.8 k/uL    Absolute Mono # 0. 40 0.1 - 1.2 k/uL    Absolute Eos # 0.30 0.0 - 0.4 k/uL    Basophils Absolute 0.10 0.0 - 0.2 k/uL   CMP    Collection Time: 02/27/23  7:10 PM   Result Value Ref Range    Glucose 89 70 - 99 mg/dL    BUN 8 6 - 20 mg/dL    Creatinine 1.03 (H) 0.50 - 0.90 mg/dL    Est, Glom Filt Rate >60 >60 mL/min/1.73m2    Calcium 9.2 8.6 - 10.4 mg/dL    Sodium 137 135 - 144 mmol/L    Potassium 3.3 (L) 3.7 - 5.3 mmol/L    Chloride 95 (L) 98 - 107 mmol/L    CO2 30 20 - 31 mmol/L    Anion Gap 12 9 - 17 mmol/L    Alkaline Phosphatase 85 35 - 104 U/L    ALT 8 5 - 33 U/L    AST 24 <32 U/L    Total Bilirubin 0.3 0.3 - 1.2 mg/dL    Total Protein 7.3 6.4 - 8.3 g/dL    Albumin 4.3 3.5 - 5.2 g/dL    Albumin/Globulin Ratio 1.4 1.0 - 2.5   Troponin    Collection Time: 02/27/23  7:10 PM   Result Value Ref Range    Troponin, High Sensitivity 10 0 - 14 ng/L   T4, Free    Collection Time: 02/27/23  7:10 PM   Result Value Ref Range    Thyroxine, Free 0.21 (L) 0.93 - 1.70 ng/dL   T3, Free    Collection Time: 02/27/23  7:10 PM   Result Value Ref Range    T3, Free 0.56 (L) 2.02 - 4.43 pg/mL   CK    Collection Time: 02/27/23  7:10 PM   Result Value Ref Range    Total  (H) 26 - 192 U/L   POC Glucose Fingerstick    Collection Time: 02/27/23  7:16 PM   Result Value Ref Range    POC Glucose 88 65 - 105 mg/dL   EKG 12 Lead    Collection Time: 02/27/23  7:20 PM   Result Value Ref Range    Ventricular Rate 65 BPM    Atrial Rate 65 BPM    P-R Interval 174 ms    QRS Duration 100 ms    Q-T Interval 424 ms    QTc Calculation (Bazett) 440 ms    P Axis 17 degrees    R Axis -18 degrees    T Axis 166 degrees   Venous Blood Gas, POC    Collection Time: 02/27/23  7:23 PM   Result Value Ref Range    pH, Fabian 7.415 7.320 - 7.430    pCO2, Fabian 46.8 41.0 - 51.0 mm Hg    pO2, Fabian 36.0 30.0 - 50.0 mm Hg    HCO3, Venous 30.0 (H) 22.0 - 29.0 mmol/L    Positive Base Excess, Fabian 5 (H) 0.0 - 3.0    O2 Sat, Fabian 69 60.0 - 85.0 %   Basic Metabolic Panel w/ Reflex to MG Collection Time: 02/28/23  5:08 AM   Result Value Ref Range    Glucose 121 (H) 70 - 99 mg/dL    BUN 6 6 - 20 mg/dL    Creatinine 1.06 (H) 0.50 - 0.90 mg/dL    Est, Glom Filt Rate >60 >60 mL/min/1.73m2    Calcium 9.2 8.6 - 10.4 mg/dL    Sodium 135 135 - 144 mmol/L    Potassium 4.2 3.7 - 5.3 mmol/L    Chloride 96 (L) 98 - 107 mmol/L    CO2 28 20 - 31 mmol/L    Anion Gap 11 9 - 17 mmol/L   Protime-INR    Collection Time: 02/28/23  5:08 AM   Result Value Ref Range    Protime 10.4 9.4 - 12.6 sec    INR 1.0    TSH with Reflex    Collection Time: 02/28/23  5:08 AM   Result Value Ref Range    TSH 45.09 (H) 0.30 - 5.00 uIU/mL   T4, Free    Collection Time: 02/28/23  5:08 AM   Result Value Ref Range    Thyroxine, Free 0.54 (L) 0.93 - 1.70 ng/dL       Imaging/Diagnostics:  XR CHEST PORTABLE    Result Date: 2/27/2023  No acute abnormality visualized. Assessment :      Hospital Problems             Last Modified POA    * (Principal) Dizziness 2/27/2023 Yes       Plan:     Severe hypothyroidism with issues with absorption  Patient status post Synthroid 200 mcg IV push  Fluids/hydrocortisone given  No evidence of myxedema coma, patient does not have hypothermia, mental status changes, bradycardia, hyponatremia, hypoglycemia, hypoventilation. The patient does not have puffiness of the hands and face. Okay to follow-up with endocrinology as an outpatient with increase Synthroid dosing and further titration of medications. May benefit from outpatient IV Synthroid via infusion clinic if endocrinology deems it appropriate. Malabsorption syndrome secondary to Demetria-en-Y with subsequent reversal  Patient has scheduled surgery to correct stomach abnormalities at the end of this coming month (March)  Depression, anxiety  Continue Prozac    Discharge home    Patient is admitted as observation status. Expected length of stay < 48 hours.  Patient is admitted in the Med/Surge    Medical Decision Making: Fabio Cuba DO  2/28/2023  11:21 AM    Copy sent to Dr. Keller Press, APRN - CNP

## 2023-02-28 NOTE — CARE COORDINATION
Case Management Assessment  Initial Evaluation    Date/Time of Evaluation: 2/28/2023 10:49 AM  Assessment Completed by: Daniela Syed RN    If patient is discharged prior to next notation, then this note serves as note for discharge by case management. Patient Name: Levi Segal                   YOB: 1985  Diagnosis: Dehydration [E86.0]  Hypokalemia [E87.6]  Dizziness [R42]  Hypoglycemia [E16.2]  Hypothyroidism, unspecified type [E03.9]                   Date / Time: 2/27/2023  6:29 PM    Patient Admission Status: Inpatient   Readmission Risk (Low < 19, Mod (19-27), High > 27): Readmission Risk Score: 11.2    Current PCP: VIVIEN Rubi CNP  PCP verified by CM? Yes    Chart Reviewed: Yes      History Provided by:    Patient Orientation: Alert and Oriented    Patient Cognition: Alert    Hospitalization in the last 30 days (Readmission):  Yes    If yes, Readmission Assessment in CM Navigator will be completed. Advance Directives:      Code Status: Full Code   Patient's Primary Decision Maker is: Legal Next of Kin      Discharge Planning:    Patient lives with: Children Type of Home: House  Primary Care Giver: Self  Patient Support Systems include: Children, Family Members   Current Financial resources:    Current community resources:    Current services prior to admission: None            Current DME:              Type of Home Care services:  None    ADLS  Prior functional level: Independent in ADLs/IADLs  Current functional level: Independent in ADLs/IADLs    PT AM-PAC:   /24  OT AM-PAC:   /24    Family can provide assistance at DC: Yes  Would you like Case Management to discuss the discharge plan with any other family members/significant others, and if so, who?  No  Plans to Return to Present Housing: Yes  Other Identified Issues/Barriers to RETURNING to current housing: none   Potential Assistance needed at discharge: N/A            Potential DME:    Patient expects to discharge to: [address] [place] [address] for transportation at discharge:      Financial    Payor: Noah Cherysuzannelesley [address] / Plan: [address] [facility] PPO / Product Type: *No Product type* /     Does insurance require precert for SNF: Yes    Potential assistance Purchasing Medications: No  Meds-to-Beds request:        [facility]  for [facility], [address] [phone]  [address] [place] [address]  Phone: [phone] Fax: Venubritt Lyubov  [address] [phone] [phone]  [facility] [address]  Phone: [phone] Fax: [phone]      Notes:    Factors facilitating achievement of predicted outcomes: Family support, Motivated, Cooperative, and Pleasant    Barriers to discharge: Medication managment    Additional Case Management Notes:   Patient lives at home with her children. Independent. [place]. No DME. Did have home care back in 2017 thru [facility] afer gastric surgery. Patient seen endocrinologist DR Dianne Hewitt. Will plan on follow up with him and her gastric surgeon DR Jann Demarco at [facility]. No needs anticipated. The Plan for Transition of Care is related to the following treatment goals of Dehydration [E86.0]  Hypokalemia [E87.6]  Dizziness [R42]  Hypoglycemia [E16.2]  Hypothyroidism, unspecified type [C96.9]    IF APPLICABLE: The Patient and/or patient representative Jacqueline Alejandra and her family were provided with a choice of provider and agrees with the discharge plan. Freedom of choice list with basic dialogue that supports the patient's individualized plan of care/goals and shares the quality data associated with the providers was provided to:     Patient Representative Name:       The Patient and/or Patient Representative Agree with the Discharge Plan?       Ana Maria Smith RN  Case Management Department  Ph: [phone] Fax: [phone]

## 2023-02-28 NOTE — PROGRESS NOTES
Physical Therapy        Physical Therapy Cancel Note      DATE: 2023    NAME: Alex Tafoya  MRN: [id number]   : 1985      Patient not seen this date for Physical Therapy due to:    Patient at baseline functional level with no acute PT needs. Will defer PT evaluation at this time. Please reorder PT if future needs arise.        Electronically signed by Lacey Sinha PT on 2023 at 11:02 AM

## 2023-02-28 NOTE — ED PROVIDER NOTES
[address] Emergency Department  [address] [facility] [address] [facility] [address]  Phone: [phone]  Fax: [phone]      Attending Physician Attestation    I performed a history and physical examination of the patient and discussed management with the mid level provider. I reviewed the mid level provider's note and agree with the documented findings and plan of care. Any areas of disagreement are noted on the chart. I was personally present for the key portions of any procedures. I have documented in the chart those procedures where I was not present during the key portions. I have reviewed the emergency nurses triage note. I agree with the chief complaint, past medical history, past surgical history, allergies, medications, social and family history as documented unless otherwise noted below. Documentation of the HPI, Physical Exam and Medical Decision Making performed by mid level providers is based on my personal performance of the HPI, PE and MDM. For Physician Assistant/ Nurse Practitioner cases/documentation I have personally evaluated this patient and have completed at least one if not all key elements of the E/M (history, physical exam, and MDM). Additional findings are as noted. CHIEF COMPLAINT       Chief Complaint   Patient presents with    Dizziness     C/o dizziness x4 days. Reports nausea. Denies chest pain or SOB. Reports \"my thyroid doctor wanted me to come. \"          HISTORY OF PRESENT ILLNESS    Jose Manuel Garcia is a 42 y.o. female who presents of generalized weakness and fatigue. The patient reports that she has had poorly controlled hypothyroidism since she had her thyroid removed secondary to cancer in 2019. The patient has had a gradual onset, constant, progressive, generalized weakness, fatigue, and she feels foggy. The patient was seen by her endocrinologist 3 days ago and had blood work drawn.   She was post to follow-up with them today but missed the appointment.  She was called by her endocrinologist and was told that her TSH was very high and her free T4 and free T3 were undetectable.  She was subsequently sent to the emergency department for concern of myxedema coma.  The patient does not list any provoking or palliating factors.  She denies any previous history of myxedema coma.  The patient denies fever, chills, headache, vision changes, neck pain, back pain, chest pain, shortness of breath, abdominal pain, nausea, vomiting, focal weakness, numbness, tingling, recent injury or illness.      PAST MEDICAL HISTORY    has a past medical history of Arrhythmia, Depression, GERD (gastroesophageal reflux disease), Hyperthyroidism, Migraine, Neurologic cardiac syncope, and Thyroid cancer (HCC).    SURGICAL HISTORY      has a past surgical history that includes Gallbladder surgery; Appendectomy; Thyroidectomy (Bilateral);  section; Breast lumpectomy (Left); Gastric bypass surgery; and Demetria-en-Y Gastric Bypass (N/A).    CURRENT MEDICATIONS       Previous Medications    AMILORIDE (MIDAMOR) 5 MG TABLET    Take 1 tablet by mouth daily    ARMODAFINIL 250 MG TABS    Take by mouth daily.    ATOGEPANT (QULIPTA) 30 MG TABS    Take 60 mg by mouth     AZELASTINE (ASTELIN) 0.1 % NASAL SPRAY    2 sprays by Nasal route 2 times daily    BUTALBITAL-ACETAMINOPHEN-CAFFEINE (FIORICET, ESGIC) -40 MG PER TABLET    Take by mouth daily as needed    CETIRIZINE (ZYRTEC) 10 MG TABLET    Take by mouth nightly     FLUOCINONIDE (LIDEX) 0.05 % CREAM    Apply topically 2 times daily Apply topically 2 times daily.    FLUOCINONIDE (LIDEX) 0.05 % EXTERNAL SOLUTION    Apply topically 2 times daily Apply topically 2 times daily.    FLUOXETINE (PROZAC) 20 MG CAPSULE    Take 1 capsule by mouth daily    FLUOXETINE (PROZAC) 40 MG CAPSULE    Take 1 capsule by mouth daily With 1 capsule of 20mg, for total daily dose of 60mg    HYDROXYZINE HCL (ATARAX) 25 MG TABLET    Take 1 tablet by mouth  every 8 hours as needed for Itching    LANSOPRAZOLE (PREVACID) 30 MG DELAYED RELEASE CAPSULE    Take 1 capsule by mouth daily    LEVOTHYROXINE (SYNTHROID) 100 MCG TABLET    Take 1 tablet by mouth daily    MAGNESIUM OXIDE 500 MG TABS    Take 500 mg by mouth 2 times daily    MULTIPLE VITAMINS-MINERALS (THERAPEUTIC MULTIVITAMIN-MINERALS) TABLET    Take 1 tablet by mouth daily    NARATRIPTAN (AMERGE) 2.5 MG TABLET    Take by mouth daily as needed    NP THYROID 120 MG TABLET        ONDANSETRON (ZOFRAN-ODT) 4 MG DISINTEGRATING TABLET    DISSOLVE ONE TABLET BY MOUTH EVERY 8 HOURS AS NEEDED FOR NAUSEA OR VOMITING    PANTOPRAZOLE (PROTONIX) 40 MG TABLET    Take 40 mg by mouth nightly     POTASSIUM CHLORIDE (KLOR-CON) 20 MEQ PACKET    Take 20 mEq by mouth 3 times daily    SUMATRIPTAN SUCCINATE 6 MG/0.5ML SOAJ    Inject as directed as needed     UBRELVY 50 MG TABS    Take 100 mg by mouth daily as needed for Migraine     VITAMIN D (CHOLECALCIFEROL) 1000 UNITS CAPS CAPSULE    Take 4,000 Units by mouth 2 times daily    VITAMIN D (ERGOCALCIFEROL) 1.25 MG (45520 UT) CAPS CAPSULE    Take 1 capsule by mouth once a week       ALLERGIES     is allergic to amoxicillin-pot clavulanate, amoxil [amoxicillin], cefdinir, frovatriptan, metoclopramide, morphine, nsaids, shrimp (diagnostic), and shrimp extract allergy skin test.    FAMILY HISTORY     She indicated that her mother is alive. She indicated that her father is alive. She indicated that both of her sisters are alive. She indicated that her brother is alive. She indicated that the status of her paternal grandfather is unknown. She indicated that the status of her maternal cousin is unknown.     family history includes Breast Cancer in her maternal cousin; Heart Disease in her father; High Blood Pressure in her father; Migraines in her mother and sister; No Known Problems in her brother and sister; Other (age of onset: 43) in her mother; Thyroid Cancer in her maternal cousin;  Thyroid Disease in her paternal grandfather. SOCIAL HISTORY      reports that she has never smoked. She has never used smokeless tobacco. She reports that she does not drink alcohol and does not use drugs. PHYSICAL EXAM     INITIAL VITALS:  height is 5' 5\" (1.651 m) and weight is 131.5 kg (290 lb). Her oral temperature is 98.5 °F (36.9 °C). Her blood pressure is 145/90 (abnormal) and her pulse is 65. Her respiration is 16 and oxygen saturation is 95%. Heart regular rate and rhythm. Lungs clear to auscultation. Abdomen soft nontender. Pupils 3 mm, equal, round reactive to light. Extraocular movements intact. Cranial nerves II through XII intact. No cerebellar signs. No pronator drift. Normal finger-nose. Normal DTRs. Strength 5/5 in the upper and lower extremities. Flat affect. DIAGNOSTIC RESULTS     EKG: All EKG's are interpreted by the Emergency Department Physician who either signs or Co-signs this chart in the absence of a cardiologist.    EKG 1920 sinus rhythm, rate 65 bpm, normal axis, normal intervals, no ST elevation or depression, T wave inversion in V2 through V6, T wave flattening in lead II, T wave inversion in 1 and aVL, poor R wave progression, Q wave in aVR, no previous EKGs available secondary to computer downtime    RADIOLOGY:     XR CHEST PORTABLE    Result Date: 2/27/2023  EXAMINATION: ONE XRAY VIEW OF THE CHEST 2/27/2023 7:26 pm COMPARISON: None. HISTORY: ORDERING SYSTEM PROVIDED HISTORY: Fatigue, Hypothyroidism TECHNOLOGIST PROVIDED HISTORY: Fatigue, Hypothyroidism Reason for Exam: dizziness FINDINGS: No infiltrate or consolidation or effusion is identified. The heart size is normal.  There is bandlike atelectasis versus scar at the left lung base. No acute abnormality visualized.         LABS:  Results for orders placed or performed during the hospital encounter of 02/27/23   TSH   Result Value Ref Range    TSH 90.51 (H) 0.30 - 5.00 uIU/mL   CBC with Auto Differential Result Value Ref Range    WBC 6.8 3.5 - 11.0 k/uL    RBC 4.52 4.0 - 5.2 m/uL    Hemoglobin 13.3 12.0 - 16.0 g/dL    Hematocrit 39.5 36 - 46 %    MCV 87.4 80 - 100 fL    MCH 29.4 26 - 34 pg    MCHC 33.6 31 - 37 g/dL    RDW 14.8 12.5 - 15.4 %    Platelets 330 705 - 381 k/uL    MPV 6.5 6.0 - 12.0 fL    Seg Neutrophils 64 36 - 66 %    Lymphocytes 24 24 - 44 %    Monocytes 6 2 - 11 %    Eosinophils % 5 (H) 1 - 4 %    Basophils 1 0 - 2 %    Segs Absolute 4.40 1.8 - 7.7 k/uL    Absolute Lymph # 1.70 1.0 - 4.8 k/uL    Absolute Mono # 0.40 0.1 - 1.2 k/uL    Absolute Eos # 0.30 0.0 - 0.4 k/uL    Basophils Absolute 0.10 0.0 - 0.2 k/uL   CMP   Result Value Ref Range    Glucose 89 70 - 99 mg/dL    BUN 8 6 - 20 mg/dL    Creatinine 1.03 (H) 0.50 - 0.90 mg/dL    Est, Glom Filt Rate >60 >60 mL/min/1.73m2    Calcium 9.2 8.6 - 10.4 mg/dL    Sodium 137 135 - 144 mmol/L    Potassium 3.3 (L) 3.7 - 5.3 mmol/L    Chloride 95 (L) 98 - 107 mmol/L    CO2 30 20 - 31 mmol/L    Anion Gap 12 9 - 17 mmol/L    Alkaline Phosphatase 85 35 - 104 U/L    ALT 8 5 - 33 U/L    AST 24 <32 U/L    Total Bilirubin 0.3 0.3 - 1.2 mg/dL    Total Protein 7.3 6.4 - 8.3 g/dL    Albumin 4.3 3.5 - 5.2 g/dL    Albumin/Globulin Ratio 1.4 1.0 - 2.5   Troponin   Result Value Ref Range    Troponin, High Sensitivity 10 0 - 14 ng/L   CK   Result Value Ref Range    Total  (H) 26 - 192 U/L   Venous Blood Gas, POC   Result Value Ref Range    pH, Fabian 7.415 7.320 - 7.430    pCO2, Fabina 46.8 41.0 - 51.0 mm Hg    pO2, Fabian 36.0 30.0 - 50.0 mm Hg    HCO3, Venous 30.0 (H) 22.0 - 29.0 mmol/L    Positive Base Excess, Fabian 5 (H) 0.0 - 3.0    O2 Sat, Fabian 69 60.0 - 85.0 %   POC Glucose Fingerstick   Result Value Ref Range    POC Glucose 88 65 - 105 mg/dL   EKG 12 Lead   Result Value Ref Range    Ventricular Rate 65 BPM    Atrial Rate 65 BPM    P-R Interval 174 ms    QRS Duration 100 ms    Q-T Interval 424 ms    QTc Calculation (Bazett) 440 ms    P Axis 17 degrees    R Axis -18 degrees T Axis 166 degrees           EMERGENCY DEPARTMENT COURSE:   Vitals:    Vitals:    02/27/23 1835 02/27/23 1957   BP: (!) 147/99 (!) 145/90   Pulse: 68 65   Resp: 16 16   Temp: 98.5 °F (36.9 °C)    TempSrc: Oral    SpO2: 97% 95%   Weight: 131.5 kg (290 lb)    Height: 5' 5\" (1.651 m)      -------------------------  BP: (!) 145/90, Temp: 98.5 °F (36.9 °C), Heart Rate: 65, Resp: 16      PERTINENT ATTENDING PHYSICIAN COMMENTS:    The patient is a 78-year-old female who presents for treatment of hypothyroidism. She has had progressively worsening fatigue and generalized weakness. She had outpatient laboratory studies done 3 days ago by her endocrinologist which showed elevated TSH and undetectable free T3 and free T4. On exam she has a flat affect but is alert and oriented x3. She has no focal neurologic deficits. Tonight her TSH is 90.51 and her free T3 and free T4 will be send outs. VBG is unremarkable. Point-of-care glucose is 88 which is low for her. We will start a D5 infusion secondary to concern of hypoglycemia with hypothyroidism leading to myxedema coma. CBC, CMP, and troponin unremarkable other than slight hypokalemia and this was replaced orally. CK is mildly elevated at 309 but she is on IV fluids. Chest x-ray shows no acute process. EKG shows sinus rhythm without any ST changes. The patient will need IV levothyroxine to help correct her deficit and this is being sent by  from [facility]. She was also given hydrocortisone to help support her. The patient will need to be admitted for further evaluation and treatment. Endocrinology is not available for consult tonight. We did attempt to consult endocrinology from multiple different sources. In several different ways. I spoke to the [facility] TANA, Misha Palomino, at 8:35 PM who agrees to admit the patient. The patient is agreeable with plan.     CRITICAL CARE: There was a high probability of clinically significant/life threatening deterioration in this patient's condition which required my urgent intervention. Total critical care time was 35 minutes. This excludes any time for separately reportable procedures.       (Please note that portions of this note were completed with a voice recognition program.  Efforts were made to edit the dictations but occasionally words are mis-transcribed.)    Naomie Diallo DO, [facility]  Attending Emergency Medicine Physician        Naomie Diallo DO  02/27/23 2061

## 2023-02-28 NOTE — PLAN OF CARE
Problem: Discharge Planning  Goal: Discharge to home or other facility with appropriate resources  Outcome: Progressing   Patient actively participates in ADLs and decision making regarding plan of care     Problem: Pain  Goal: Verbalizes/displays adequate comfort level or baseline comfort level  Outcome: Progressing   No new signs/symptoms of pain noted, pain rating < 3 on scale 0-10, pain controlled with medication/ repositioning     Problem: Safety - Adult  Goal: Free from fall injury  Outcome: Progressing   No falls/injuries this shift, bed in lowest position, breaks on, bed alarm on, call light within reach, side rails up X2

## 2023-03-01 ENCOUNTER — CARE COORDINATION (OUTPATIENT)
Dept: CASE MANAGEMENT | Age: 38
End: 2023-03-01

## 2023-03-01 DIAGNOSIS — E03.8 OTHER SPECIFIED HYPOTHYROIDISM: Primary | ICD-10-CM

## 2023-03-01 LAB
EKG ATRIAL RATE: 65 BPM
EKG P AXIS: 17 DEGREES
EKG P-R INTERVAL: 174 MS
EKG Q-T INTERVAL: 424 MS
EKG QRS DURATION: 100 MS
EKG QTC CALCULATION (BAZETT): 440 MS
EKG R AXIS: -18 DEGREES
EKG T AXIS: 166 DEGREES
EKG VENTRICULAR RATE: 65 BPM

## 2023-03-01 NOTE — CARE COORDINATION
[facility] Care Transitions Initial Follow Up Call    Call within 2 business days of discharge: Yes    Patient Current Location:  [facility] [address] [address] contacted the patient by telephone to perform post hospital discharge assessment. Verified name and  with patient as identifiers. Provided introduction to self, and explanation of the LPN Care Coordinator role. Patient: Tiara Garcia Patient : 1985   MRN: [id number]  Reason for Admission: [address]  Discharge Date: 23 RARS: Readmission Risk Score: 11.2      Last Discharge [address]       Date Complaint Diagnosis Description Type Department Provider    23 Dizziness Hypothyroidism, unspecified type . .. ED to Hosp-Admission (Discharged) (ADMITTED) [facility] MS Lorie Ar, DO; [facility] [place]. .. Was this an external facility discharge? No Discharge Facility: [facility]    Challenges to be reviewed by the provider   Additional needs identified to be addressed with provider: No  none               Method of communication with provider: none. Transitions of Care Initial Call: spoke to [address] the role of Care Transition Nurse and the Transition program, patient is agreeable to follow up calls Post discharge from the [address] [place] [address] Drive   Denies chest pain . Reports her dizziness and nausea have improved but has no completely resolved. Appetite is fair. Bowel and bladder WNL. 1111F medication reconciliation completed. She has new medication and is taking all medication as directed. Reviewed need for follow up with PCP and Endocrinology. Pt has an appt tomorrow with Dr Aj Stacy Endocrinology. She will schedule with her PCP. Denies need for [facility] or [facility]   Patient is aware of when to contact MD with any new or worsening symptoms. Advised to contact PCP 24/ with any health concerns for early outpatient intervention in an effort to avoid hospitalization.   Report any worsening symptoms to PCP and/or Call 911 and/or GO TO EMERGENCY ROOM if symptoms are severe. Expresses understanding. [facility] Care Coordinator reviewed discharge instructions with patient who verbalized understanding. The patient was given an opportunity to ask questions and does not have any further questions or concerns at this time. Were discharge instructions available to patient? Yes. Reviewed appropriate site of care based on symptoms and resources available to patient including: PCP  Specialist  When to call [address] The patient agrees to contact the PCP office for questions related to their healthcare. Advance Care Planning:   Does patient have an Advance Directive: reviewed and current. Medication reconciliation was performed with patient, who verbalizes understanding of administration of home medications. Medications reviewed, 1111F entered: yes    Was patient discharged with a pulse oximeter? no    Non-face-to-face services provided:  Obtained and reviewed discharge summary and/or continuity of care documents    Offered patient enrollment in the Remote Patient Monitoring (RPM) program for in-home monitoring: NA.    Care Transitions 24 Hour Call    Do you have all of your prescriptions and are they filled?: Yes  Care Transitions Interventions         Discussed follow-up appointments. If no appointment was previously scheduled, appointment scheduling offered: Yes. Is follow up appointment scheduled within 7 days of discharge? Yes. Follow Up  No future appointments. LPN Care Coordinator provided contact information. Plan for follow-up call in 5-7 days based on severity of symptoms and risk factors.   Plan for next call: symptom management-dizziness  nausea   follow-up appointment-with Endocrinology 3/2/23  medication management-take all medications as directed    Ana Aguilera, [facility] Care Transitions Nurse   [phone]

## 2023-03-02 ENCOUNTER — TELEPHONE (OUTPATIENT)
Dept: FAMILY MEDICINE CLINIC | Age: 38
End: 2023-03-02

## 2023-03-02 ENCOUNTER — OFFICE VISIT (OUTPATIENT)
Dept: FAMILY MEDICINE CLINIC | Age: 38
End: 2023-03-02

## 2023-03-02 DIAGNOSIS — R42 DIZZINESS: ICD-10-CM

## 2023-03-02 DIAGNOSIS — R94.31 ABNORMAL EKG: Primary | ICD-10-CM

## 2023-03-02 DIAGNOSIS — E03.9 HYPOTHYROIDISM, UNSPECIFIED TYPE: ICD-10-CM

## 2023-03-02 DIAGNOSIS — Z09 HOSPITAL DISCHARGE FOLLOW-UP: ICD-10-CM

## 2023-03-02 SDOH — ECONOMIC STABILITY: TRANSPORTATION INSECURITY
IN THE PAST 12 MONTHS, HAS LACK OF TRANSPORTATION KEPT YOU FROM MEETINGS, WORK, OR FROM GETTING THINGS NEEDED FOR DAILY LIVING?: PATIENT DECLINED

## 2023-03-02 SDOH — ECONOMIC STABILITY: HOUSING INSECURITY
IN THE LAST 12 MONTHS, WAS THERE A TIME WHEN YOU DID NOT HAVE A STEADY PLACE TO SLEEP OR SLEPT IN A SHELTER (INCLUDING NOW)?: PATIENT REFUSED

## 2023-03-02 SDOH — ECONOMIC STABILITY: FOOD INSECURITY: WITHIN THE PAST 12 MONTHS, THE FOOD YOU BOUGHT JUST DIDN'T LAST AND YOU DIDN'T HAVE MONEY TO GET MORE.: PATIENT DECLINED

## 2023-03-02 SDOH — ECONOMIC STABILITY: INCOME INSECURITY: HOW HARD IS IT FOR YOU TO PAY FOR THE VERY BASICS LIKE FOOD, HOUSING, MEDICAL CARE, AND HEATING?: SOMEWHAT HARD

## 2023-03-02 SDOH — ECONOMIC STABILITY: FOOD INSECURITY: WITHIN THE PAST 12 MONTHS, YOU WORRIED THAT YOUR FOOD WOULD RUN OUT BEFORE YOU GOT MONEY TO BUY MORE.: PATIENT DECLINED

## 2023-03-02 ASSESSMENT — PATIENT HEALTH QUESTIONNAIRE - PHQ9
SUM OF ALL RESPONSES TO PHQ9 QUESTIONS 1 & 2: 6
4. FEELING TIRED OR HAVING LITTLE ENERGY: 3
5. POOR APPETITE OR OVEREATING: 2
SUM OF ALL RESPONSES TO PHQ QUESTIONS 1-9: 16
6. FEELING BAD ABOUT YOURSELF - OR THAT YOU ARE A FAILURE OR HAVE LET YOURSELF OR YOUR FAMILY DOWN: 0
7. TROUBLE CONCENTRATING ON THINGS, SUCH AS READING THE NEWSPAPER OR WATCHING TELEVISION: 2
9. THOUGHTS THAT YOU WOULD BE BETTER OFF DEAD, OR OF HURTING YOURSELF: 0
2. FEELING DOWN, DEPRESSED OR HOPELESS: 3
3. TROUBLE FALLING OR STAYING ASLEEP: 3
1. LITTLE INTEREST OR PLEASURE IN DOING THINGS: 3
10. IF YOU CHECKED OFF ANY PROBLEMS, HOW DIFFICULT HAVE THESE PROBLEMS MADE IT FOR YOU TO DO YOUR WORK, TAKE CARE OF THINGS AT HOME, OR GET ALONG WITH OTHER PEOPLE: 2
SUM OF ALL RESPONSES TO PHQ QUESTIONS 1-9: 16
8. MOVING OR SPEAKING SO SLOWLY THAT OTHER PEOPLE COULD HAVE NOTICED. OR THE OPPOSITE, BEING SO FIGETY OR RESTLESS THAT YOU HAVE BEEN MOVING AROUND A LOT MORE THAN USUAL: 0

## 2023-03-02 ASSESSMENT — ANXIETY QUESTIONNAIRES
IF YOU CHECKED OFF ANY PROBLEMS ON THIS QUESTIONNAIRE, HOW DIFFICULT HAVE THESE PROBLEMS MADE IT FOR YOU TO DO YOUR WORK, TAKE CARE OF THINGS AT HOME, OR GET ALONG WITH OTHER PEOPLE: NOT DIFFICULT AT ALL
3. WORRYING TOO MUCH ABOUT DIFFERENT THINGS: 0
6. BECOMING EASILY ANNOYED OR IRRITABLE: 0
1. FEELING NERVOUS, ANXIOUS, OR ON EDGE: 0
2. NOT BEING ABLE TO STOP OR CONTROL WORRYING: 0
GAD7 TOTAL SCORE: 0
4. TROUBLE RELAXING: 0
7. FEELING AFRAID AS IF SOMETHING AWFUL MIGHT HAPPEN: 0
5. BEING SO RESTLESS THAT IT IS HARD TO SIT STILL: 0

## 2023-03-02 NOTE — TELEPHONE ENCOUNTER
Care Transitions Initial Follow Up Call    Outreach made within 2 business days of discharge: Yes    Patient: Sherrill Hodges Patient : 1985   MRN: [id number]  Reason for Admission: There are no discharge diagnoses documented for the most recent discharge. Discharge Date: 23       Spoke with: Zahira Hendrickson    Discharge department/facility: [facility] Interactive Patient Contact:  Was patient able to fill all prescriptions: Yes  Was patient instructed to bring all medications to the follow-up visit: Yes  Is patient taking all medications as directed in the discharge summary? Yes  Does patient understand their discharge instructions: Yes  Does patient have questions or concerns that need addressed prior to 7-14 day follow up office visit:Patient has some questions to go over with if possible. Scheduled appointment with PCP within 7-14 days    Follow Up  No future appointments.     Bob Barrett LPN

## 2023-03-02 NOTE — PROGRESS NOTES
diarrhea.   Musculoskeletal:  Negative for arthralgias and gait problem.   Neurological:  Negative for weakness and headaches. Dizziness: currently improved.  Psychiatric/Behavioral:  Negative for confusion. The patient is not nervous/anxious.      Objective:    Patient-Reported Vitals  No data recorded    Physical Exam  [INSTRUCTIONS:  \"[x]\" Indicates a positive item  \"[]\" Indicates a negative item  -- DELETE ALL ITEMS NOT EXAMINED]    Constitutional: [x] Appears well-developed and well-nourished [x] No apparent distress      [] Abnormal -     Mental status: [x] Alert and awake  [x] Oriented to person/place/time [x] Able to follow commands    [] Abnormal -     Eyes:   EOM    [x]  Normal    [] Abnormal -   Sclera  [x]  Normal    [] Abnormal -          Discharge [x]  None visible   [] Abnormal -     HENT: [x] Normocephalic, atraumatic  [] Abnormal -   [x] Mouth/Throat: Mucous membranes are moist    External Ears [x] Normal  [] Abnormal -    Neck: [x] No visualized mass [] Abnormal -     Pulmonary/Chest: [x] Respiratory effort normal   [x] No visualized signs of difficulty breathing or respiratory distress        [] Abnormal -      Musculoskeletal:   [x] Normal gait with no signs of ataxia         [x] Normal range of motion of neck        [] Abnormal -     Neurological:        [x] No Facial Asymmetry (Cranial nerve 7 motor function) (limited exam due to video visit)          [x] No gaze palsy        [] Abnormal -          Skin:        [x] No significant exanthematous lesions or discoloration noted on facial skin         [] Abnormal -            Psychiatric:       [x] Normal Affect [] Abnormal -        [x] No Hallucinations    Other pertinent observable physical exam findings:-      Kandance L Miller, was evaluated through a synchronous (real-time) audio-video encounter. The patient (or guardian if applicable) is aware that this is a billable service, which includes applicable co-pays. This Virtual Visit was conducted

## 2023-03-07 ENCOUNTER — HOSPITAL ENCOUNTER (OUTPATIENT)
Age: 38
Discharge: HOME | End: 2023-03-07
Payer: COMMERCIAL

## 2023-03-07 DIAGNOSIS — R94.31 ABNORMAL EKG: ICD-10-CM

## 2023-03-07 LAB
EKG ATRIAL RATE: 65 BPM
EKG P AXIS: 18 DEGREES
EKG P-R INTERVAL: 156 MS
EKG Q-T INTERVAL: 410 MS
EKG QRS DURATION: 110 MS
EKG QTC CALCULATION (BAZETT): 426 MS
EKG R AXIS: 0 DEGREES
EKG T AXIS: -34 DEGREES
EKG VENTRICULAR RATE: 65 BPM
T3FREE SERPL-MCNC: 1.8 PG/ML (ref 2.02–4.43)
TSH SERPL-ACNC: 46.15 UIU/ML (ref 0.3–5)

## 2023-03-07 PROCEDURE — 84443 ASSAY THYROID STIM HORMONE: CPT

## 2023-03-07 PROCEDURE — 93005 ELECTROCARDIOGRAM TRACING: CPT | Performed by: NURSE PRACTITIONER

## 2023-03-07 PROCEDURE — 36415 COLL VENOUS BLD VENIPUNCTURE: CPT

## 2023-03-07 PROCEDURE — 93010 ELECTROCARDIOGRAM REPORT: CPT | Performed by: INTERNAL MEDICINE

## 2023-03-07 PROCEDURE — 84481 FREE ASSAY (FT-3): CPT

## 2023-03-08 ENCOUNTER — CARE COORDINATION (OUTPATIENT)
Dept: CASE MANAGEMENT | Age: 38
End: 2023-03-08

## 2023-03-08 NOTE — CARE COORDINATION
[facility] Care Transitions Follow Up Call    Patient Current Location:  Home: [address] [address]    [facility] Care Coordinator contacted the patient by telephone to follow up after admission on 23. Verified name and  with patient as identifiers. Patient: Denys Almaraz  Patient : 1985   MRN: [id number]  Reason for Admission:  dizziness  Discharge Date: 23 RARS: Readmission Risk Score: 11.2      Needs to be reviewed by the provider   Additional needs identified to be addressed with provider: No  none             Method of communication with provider: none. Subsequent transitional call. Spoke to :  [place]. Pt states she has no dizziness or nausea. Hx abnormal thyroid No chest pain or dyspnea. Appetite is good. Takes all medications as directed. She had an EKG and labs 3/7/23. Pt states she has stomach surgery scheduled at [facility] on 3/24/23. Seen by her PCP 3/2/23. Seen by Dr Jamee Elizabeth on 3/3/23. Will continue to follow. Patient is aware of when to contact MD with any new or worsening symptoms. Advised to contact PCP  with any health concerns for early outpatient intervention in an effort to avoid hospitalization. Report any worsening symptoms to PCP and/or Call 911 and/or GO TO EMERGENCY ROOM if symptoms are severe. Expresses understanding. Addressed changes since last contact:  none  Discussed follow-up appointments. If no appointment was previously scheduled, appointment scheduling offered: Yes. Is follow up appointment scheduled within 7 days of discharge? Yes. Follow Up  No future appointments. Non-[facility] follow up appointment(s): Dr Miles Magana  3/23/23    [facility] Care Coordinator reviewed discharge instructions with patient and discussed any barriers to care and/or understanding of plan of care after discharge. Discussed appropriate site of care based on symptoms and resources available to patient including: PCP  Specialist  When to call [address]  The patient agrees to contact the PCP office for questions related to their healthcare. Advance Care Planning:   reviewed and current. Patients top risk factors for readmission: medical condition-dizziness  Interventions to address risk factors: Obtained and reviewed discharge summary and/or continuity of care documents    Offered patient enrollment in the Remote Patient Monitoring (RPM) program for in-home monitoring: NA.     Care Transitions Subsequent and Final Call    Subsequent and Final Calls  Care Transitions Interventions  Other Interventions:             [facility] Care Coordinator provided contact information for future needs. Plan for follow-up call in 7-10 days based on severity of symptoms and risk factors.   Plan for next call: symptom management-dizziness nausea     Breana Frazier, [address] [facility] Care Transitions Nurse   [phone]

## 2023-03-09 DIAGNOSIS — F32.9 MAJOR DEPRESSIVE DISORDER WITH CURRENT ACTIVE EPISODE, UNSPECIFIED DEPRESSION EPISODE SEVERITY, UNSPECIFIED WHETHER RECURRENT: ICD-10-CM

## 2023-03-09 DIAGNOSIS — F41.9 ANXIETY: ICD-10-CM

## 2023-03-09 NOTE — TELEPHONE ENCOUNTER
Request for Prozac. Last script sent: 12/19/22  Last OV: 3/2/23  Next Visit Date:  No future appointments.     Health Maintenance   Topic Date Due    COVID-19 Vaccine (1) Never done    Cervical cancer screen  Never done    Flu vaccine (1) 08/01/2022    A1C test (Diabetic or Prediabetic)  04/08/2023    Depression Monitoring  03/02/2024    DTaP/Tdap/Td vaccine (3 - Td or Tdap) 11/06/2032    Hepatitis A vaccine  Aged Out    Hib vaccine  Aged Out    Meningococcal (ACWY) vaccine  Aged Out    Pneumococcal 0-64 years Vaccine  Aged Out    Varicella vaccine  Discontinued    Hepatitis C screen  Discontinued    HIV screen  Discontinued       Hemoglobin A1C (%)   Date Value   04/08/2022 4.9   01/14/2021 4.7   06/25/2020 4.9             ( goal A1C is < 7)   No results found for: LABMICR  LDL Cholesterol (mg/dL)   Date Value   04/08/2022 152 (H)       (goal LDL is <100)   AST (U/L)   Date Value   02/27/2023 24     ALT (U/L)   Date Value   02/27/2023 8     BUN (mg/dL)   Date Value   02/28/2023 6     BP Readings from Last 3 Encounters:   02/28/23 (!) 127/93   01/13/23 124/84   05/23/22 132/82          (goal 120/80)    All Future Testing planned in [facility]  Lab Frequency Next Occurrence   EKG 12 Lead Once 03/07/2023         Patient Active Problem List:     Depression     Arrhythmia     Neurogenic syncope     Migraine     Encounter to establish care     Vitamin D deficiency     Elevated fasting glucose     Hypokalemia     Anxiety     Bradycardia     Chest pain     Postoperative hypothyroidism     Adenomatous goiter, toxic or with hyperthyroidism     Benign essential hypertension     Fatigue     History of Demetria-en-Y gastric bypass     Iron deficiency anemia     Malignant tumor of thyroid gland (HCC)     Morbid obesity with BMI of 40.0-44.9, adult (HCC)     Dizziness     Hypothyroidism

## 2023-03-11 LAB
MISCELLANEOUS LAB TEST RESULT: ABNORMAL
TEST NAME: ABNORMAL

## 2023-03-14 ENCOUNTER — CARE COORDINATION (OUTPATIENT)
Dept: CASE MANAGEMENT | Age: 38
End: 2023-03-14

## 2023-03-14 RX ORDER — FLUOXETINE HYDROCHLORIDE 20 MG/1
20 CAPSULE ORAL DAILY
Qty: 90 CAPSULE | Refills: 1 | Status: SHIPPED | OUTPATIENT
Start: 2023-03-14 | End: 2024-03-14

## 2023-03-14 NOTE — CARE COORDINATION
[facility] Care Transitions Follow Up Call    Patient Current Location:  Home: [address] [place] [address]    [facility] Care Coordinator contacted the patient by telephone to follow up after admission on 23. Verified name and  with patient as identifiers. Patient: Lourdes Alves  Patient : 1985   MRN: [id number]  Reason for Admission: dizziness  Discharge Date: 23 RARS: Readmission Risk Score: 11.2      Needs to be reviewed by the provider   Additional needs identified to be addressed with provider: No  none             Method of communication with provider: none. Subsequent transitional call. Spoke to :  [place]. Pt states she is feeling well. She has no dizziness, nausea or vomiting. No chest pain or dyspnea. She was seen by her PCP 3/2/23. She was also seen by Dr General Ybarra. Endocrinology. Pt states she is having surgery for hernia repair and removal of second pouch in her stomach on 3/24/23 at [facility]. No new issues or concerns. Final call. Patient is aware of when to contact MD with any new or worsening symptoms. Advised to contact PCP  with any health concerns for early outpatient intervention in an effort to avoid hospitalization. Report any worsening symptoms to PCP and/or Call 911 and/or GO TO EMERGENCY ROOM if symptoms are severe. Expresses understanding. Addressed changes since last contact:  none  Discussed follow-up appointments. If no appointment was previously scheduled, appointment scheduling offered: Yes. Is follow up appointment scheduled within 7 days of discharge? Yes. Follow Up  No future appointments. Non-[facility] follow up appointment(s): [address] [place] [address] Coordinator reviewed discharge instructions with patient and discussed any barriers to care and/or understanding of plan of care after discharge. Discussed appropriate site of care based on symptoms and resources available to patient including: PCP  Specialist  When to call [address]  The patient agrees to contact the PCP office for questions related to their healthcare. Advance Care Planning:   reviewed and current. Patients top risk factors for readmission: medical condition-DIZZINESS thyroid issues  Interventions to address risk factors: Obtained and reviewed discharge summary and/or continuity of care documents    Offered patient enrollment in the Remote Patient Monitoring (RPM) program for in-home monitoring: Patient is not eligible for RPM program.     Care Transitions Subsequent and Final Call    Subsequent and Final Calls  Do you have any ongoing symptoms?: No  Have your medications changed?: No  Do you have any questions related to your medications?: No  Do you currently have any active services?: No  Do you have any needs or concerns that I can assist you with?: No  Identified Barriers: None  Care Transitions Interventions  No Identified Needs  Other Interventions:             [facility] Care Coordinator provided contact information for future needs. No further follow-up call indicated based on severity of symptoms and risk factors.   Plan for next call:  [address], [address] [facility]   [phone]

## 2023-03-17 ENCOUNTER — TELEPHONE (OUTPATIENT)
Dept: FAMILY MEDICINE CLINIC | Age: 38
End: 2023-03-17

## 2023-03-17 NOTE — TELEPHONE ENCOUNTER
Pt called requesting that you speak with Dr. Marisol Devlin about preforming her surgery because he said her TSH is too high but that's why she needs surgery.

## 2023-03-20 ASSESSMENT — ENCOUNTER SYMPTOMS
DIARRHEA: 0
COUGH: 0
CONSTIPATION: 0
SHORTNESS OF BREATH: 0
RHINORRHEA: 0
SORE THROAT: 0

## 2023-03-20 NOTE — TELEPHONE ENCOUNTER
Spoke with the patient and she was notified this will need to be discussed between her 2 specialist. Patient states she understands.

## 2023-03-20 NOTE — TELEPHONE ENCOUNTER
PATIENT IS SEEING DR. Awad Prior  - HER ENDOCRINOLOGIST FOR TREATMENT OF THIS, HE WILL BE IN CHARGE OF INCREASING HER DOSAGE AND MAKING DECISIONS REGARDING INFUSING THIS MEDICATION. I AM NOT GOING TO CALL A PHYSICIAN AND DISCUSS WITH THEM THE NEED TO HAVE A PROCEDURE, THAT NEEDS TO BE COMPLETED WHEN THE SPECIALIST, AND HIM NEED TO HAVE A DISCUSSION IN HOW THIS MEDICATION IS GOING TO BE BALANCED. PRIOR AND POST A PROCEDURE THAT WILL AFFECT HER OVERALL ABSORPTION. I AM I THE PROCESS OF DISCUSSING WITH dR. HERNANDEZ, BUT I FEEL THAT THIS IS INFORMATION THAT THE TWO SPECIALIST NEED TO DISCUSS HOW TO MANAGE AND PROCEED WITH THE PROCEDURE TO HELP WITH MALABSORPTION ISSUES.

## 2023-03-22 ENCOUNTER — HOSPITAL ENCOUNTER (OUTPATIENT)
Age: 38
Discharge: HOME | End: 2023-03-22
Payer: COMMERCIAL

## 2023-03-22 LAB
T3FREE SERPL-MCNC: 8.32 PG/ML (ref 2.02–4.43)
T4 FREE SERPL-MCNC: 0.76 NG/DL (ref 0.93–1.7)
TSH SERPL-ACNC: 10.8 UIU/ML (ref 0.3–5)

## 2023-03-22 PROCEDURE — 36415 COLL VENOUS BLD VENIPUNCTURE: CPT

## 2023-03-22 PROCEDURE — 84443 ASSAY THYROID STIM HORMONE: CPT

## 2023-03-22 PROCEDURE — 84439 ASSAY OF FREE THYROXINE: CPT

## 2023-03-22 PROCEDURE — 84481 FREE ASSAY (FT-3): CPT

## 2023-03-28 ENCOUNTER — HOSPITAL ENCOUNTER (OUTPATIENT)
Age: 38
Discharge: HOME | End: 2023-03-28
Payer: COMMERCIAL

## 2023-03-28 LAB
T3FREE SERPL-MCNC: 2.8 PG/ML (ref 2.02–4.43)
TSH SERPL-ACNC: 5.68 UIU/ML (ref 0.3–5)

## 2023-03-28 PROCEDURE — 84443 ASSAY THYROID STIM HORMONE: CPT

## 2023-03-28 PROCEDURE — 84481 FREE ASSAY (FT-3): CPT

## 2023-03-28 PROCEDURE — 36415 COLL VENOUS BLD VENIPUNCTURE: CPT

## 2023-04-03 ENCOUNTER — HOSPITAL ENCOUNTER (OUTPATIENT)
Age: 38
Discharge: HOME | End: 2023-04-03
Payer: COMMERCIAL

## 2023-04-03 LAB
T3FREE SERPL-MCNC: 4.12 PG/ML (ref 2.02–4.43)
T4 FREE SERPL-MCNC: 2.28 NG/DL (ref 0.93–1.7)
TSH SERPL-ACNC: 0.42 UIU/ML (ref 0.3–5)

## 2023-04-03 PROCEDURE — 84481 FREE ASSAY (FT-3): CPT

## 2023-04-03 PROCEDURE — 84439 ASSAY OF FREE THYROXINE: CPT

## 2023-04-03 PROCEDURE — 36415 COLL VENOUS BLD VENIPUNCTURE: CPT

## 2023-04-03 PROCEDURE — 84443 ASSAY THYROID STIM HORMONE: CPT

## 2023-04-05 ENCOUNTER — TELEPHONE (OUTPATIENT)
Dept: GASTROENTEROLOGY | Age: 38
End: 2023-04-05

## 2023-04-19 ENCOUNTER — HOSPITAL ENCOUNTER (OUTPATIENT)
Age: 38
Discharge: HOME | End: 2023-04-19
Payer: COMMERCIAL

## 2023-04-19 LAB — TSH SERPL-ACNC: 4.19 UIU/ML (ref 0.3–5)

## 2023-04-19 PROCEDURE — 36415 COLL VENOUS BLD VENIPUNCTURE: CPT

## 2023-04-19 PROCEDURE — 84443 ASSAY THYROID STIM HORMONE: CPT

## 2023-04-19 PROCEDURE — 84481 FREE ASSAY (FT-3): CPT

## 2023-04-19 PROCEDURE — 84439 ASSAY OF FREE THYROXINE: CPT

## 2023-04-20 LAB — T3FREE SERPL-MCNC: 3.57 PG/ML (ref 2.02–4.43)

## 2023-04-21 LAB — T4 FREE SERPL-MCNC: 1.1 NG/DL (ref 0.9–1.7)

## 2023-06-01 DIAGNOSIS — L40.9 PSORIASIS: ICD-10-CM

## 2023-06-01 RX ORDER — FLUOCINONIDE TOPICAL SOLUTION USP, 0.05% 0.5 MG/ML
SOLUTION TOPICAL 2 TIMES DAILY
Qty: 60 ML | Refills: 5 | Status: SHIPPED | OUTPATIENT
Start: 2023-06-01

## 2023-06-27 ENCOUNTER — OFFICE VISIT (OUTPATIENT)
Dept: FAMILY MEDICINE CLINIC | Age: 38
End: 2023-06-27
Payer: COMMERCIAL

## 2023-06-27 VITALS
BODY MASS INDEX: 49.92 KG/M2 | HEART RATE: 68 BPM | TEMPERATURE: 97.5 F | WEIGHT: 293 LBS | SYSTOLIC BLOOD PRESSURE: 132 MMHG | DIASTOLIC BLOOD PRESSURE: 84 MMHG | OXYGEN SATURATION: 100 %

## 2023-06-27 DIAGNOSIS — L40.9 PSORIASIS: ICD-10-CM

## 2023-06-27 DIAGNOSIS — R11.0 NAUSEA: ICD-10-CM

## 2023-06-27 DIAGNOSIS — F41.9 ANXIETY: ICD-10-CM

## 2023-06-27 DIAGNOSIS — Z00.00 ANNUAL PHYSICAL EXAM: Primary | ICD-10-CM

## 2023-06-27 DIAGNOSIS — F32.9 MAJOR DEPRESSIVE DISORDER WITH CURRENT ACTIVE EPISODE, UNSPECIFIED DEPRESSION EPISODE SEVERITY, UNSPECIFIED WHETHER RECURRENT: ICD-10-CM

## 2023-06-27 DIAGNOSIS — E78.2 MODERATE MIXED HYPERLIPIDEMIA NOT REQUIRING STATIN THERAPY: ICD-10-CM

## 2023-06-27 DIAGNOSIS — R73.01 IFG (IMPAIRED FASTING GLUCOSE): ICD-10-CM

## 2023-06-27 PROCEDURE — 3078F DIAST BP <80 MM HG: CPT | Performed by: NURSE PRACTITIONER

## 2023-06-27 PROCEDURE — 99396 PREV VISIT EST AGE 40-64: CPT | Performed by: NURSE PRACTITIONER

## 2023-06-27 PROCEDURE — 3074F SYST BP LT 130 MM HG: CPT | Performed by: NURSE PRACTITIONER

## 2023-06-27 RX ORDER — LANSOPRAZOLE 30 MG/1
30 CAPSULE, DELAYED RELEASE ORAL DAILY
Qty: 30 CAPSULE | Refills: 5 | Status: SHIPPED | OUTPATIENT
Start: 2023-06-27 | End: 2024-06-26

## 2023-06-27 RX ORDER — ONDANSETRON 4 MG/1
TABLET, ORALLY DISINTEGRATING ORAL
Qty: 90 TABLET | Refills: 3 | Status: SHIPPED | OUTPATIENT
Start: 2023-06-27

## 2023-06-27 RX ORDER — FLUOXETINE HYDROCHLORIDE 40 MG/1
40 CAPSULE ORAL DAILY
Qty: 90 CAPSULE | Refills: 3 | Status: SHIPPED | OUTPATIENT
Start: 2023-06-27 | End: 2024-06-27

## 2023-06-27 RX ORDER — CLOBETASOL PROPIONATE 0.05 G/100ML
1 SHAMPOO TOPICAL
Qty: 118 ML | Refills: 1 | Status: SHIPPED | OUTPATIENT
Start: 2023-06-27 | End: 2023-07-18

## 2023-06-27 ASSESSMENT — PATIENT HEALTH QUESTIONNAIRE - PHQ9
SUM OF ALL RESPONSES TO PHQ QUESTIONS 1-9: 0
SUM OF ALL RESPONSES TO PHQ QUESTIONS 1-9: 0
7. TROUBLE CONCENTRATING ON THINGS, SUCH AS READING THE NEWSPAPER OR WATCHING TELEVISION: 0
SUM OF ALL RESPONSES TO PHQ QUESTIONS 1-9: 0
SUM OF ALL RESPONSES TO PHQ QUESTIONS 1-9: 0
SUM OF ALL RESPONSES TO PHQ9 QUESTIONS 1 & 2: 0
4. FEELING TIRED OR HAVING LITTLE ENERGY: 0
10. IF YOU CHECKED OFF ANY PROBLEMS, HOW DIFFICULT HAVE THESE PROBLEMS MADE IT FOR YOU TO DO YOUR WORK, TAKE CARE OF THINGS AT HOME, OR GET ALONG WITH OTHER PEOPLE: 0
9. THOUGHTS THAT YOU WOULD BE BETTER OFF DEAD, OR OF HURTING YOURSELF: 0
5. POOR APPETITE OR OVEREATING: 0
1. LITTLE INTEREST OR PLEASURE IN DOING THINGS: 0
2. FEELING DOWN, DEPRESSED OR HOPELESS: 0
3. TROUBLE FALLING OR STAYING ASLEEP: 0
8. MOVING OR SPEAKING SO SLOWLY THAT OTHER PEOPLE COULD HAVE NOTICED. OR THE OPPOSITE, BEING SO FIGETY OR RESTLESS THAT YOU HAVE BEEN MOVING AROUND A LOT MORE THAN USUAL: 0
6. FEELING BAD ABOUT YOURSELF - OR THAT YOU ARE A FAILURE OR HAVE LET YOURSELF OR YOUR FAMILY DOWN: 0

## 2023-06-27 ASSESSMENT — ANXIETY QUESTIONNAIRES
GAD7 TOTAL SCORE: 0
1. FEELING NERVOUS, ANXIOUS, OR ON EDGE: 0
4. TROUBLE RELAXING: 0
6. BECOMING EASILY ANNOYED OR IRRITABLE: 0
2. NOT BEING ABLE TO STOP OR CONTROL WORRYING: 0
7. FEELING AFRAID AS IF SOMETHING AWFUL MIGHT HAPPEN: 0
5. BEING SO RESTLESS THAT IT IS HARD TO SIT STILL: 0
IF YOU CHECKED OFF ANY PROBLEMS ON THIS QUESTIONNAIRE, HOW DIFFICULT HAVE THESE PROBLEMS MADE IT FOR YOU TO DO YOUR WORK, TAKE CARE OF THINGS AT HOME, OR GET ALONG WITH OTHER PEOPLE: NOT DIFFICULT AT ALL
3. WORRYING TOO MUCH ABOUT DIFFERENT THINGS: 0

## 2023-06-30 ASSESSMENT — ENCOUNTER SYMPTOMS
FACIAL SWELLING: 0
TROUBLE SWALLOWING: 0
ABDOMINAL PAIN: 0
SINUS PRESSURE: 0
SHORTNESS OF BREATH: 0
CONSTIPATION: 0
CHEST TIGHTNESS: 0
DIARRHEA: 0
EYE PAIN: 0
WHEEZING: 0

## 2023-07-05 ENCOUNTER — TELEPHONE (OUTPATIENT)
Dept: FAMILY MEDICINE CLINIC | Age: 38
End: 2023-07-05

## 2023-07-05 NOTE — TELEPHONE ENCOUNTER
PA request for Clobetasol shampoo     PA processed and submitted to pt insurance, waiting for response in regards to coverage

## 2023-07-24 DIAGNOSIS — F41.9 ANXIETY: ICD-10-CM

## 2023-07-24 DIAGNOSIS — L40.9 PSORIASIS: ICD-10-CM

## 2023-07-24 DIAGNOSIS — F32.9 MAJOR DEPRESSIVE DISORDER WITH CURRENT ACTIVE EPISODE, UNSPECIFIED DEPRESSION EPISODE SEVERITY, UNSPECIFIED WHETHER RECURRENT: ICD-10-CM

## 2023-07-24 DIAGNOSIS — R11.0 NAUSEA: ICD-10-CM

## 2023-07-26 RX ORDER — ONDANSETRON 4 MG/1
TABLET, ORALLY DISINTEGRATING ORAL
Qty: 90 TABLET | Refills: 3 | OUTPATIENT
Start: 2023-07-26

## 2023-07-26 RX ORDER — FLUOXETINE HYDROCHLORIDE 20 MG/1
20 CAPSULE ORAL DAILY
Qty: 90 CAPSULE | Refills: 1 | OUTPATIENT
Start: 2023-07-26 | End: 2024-07-26

## 2023-07-26 RX ORDER — FLUOCINONIDE TOPICAL SOLUTION USP, 0.05% 0.5 MG/ML
SOLUTION TOPICAL 2 TIMES DAILY
Qty: 60 ML | Refills: 5 | OUTPATIENT
Start: 2023-07-26

## 2023-07-26 RX ORDER — LANSOPRAZOLE 30 MG/1
30 CAPSULE, DELAYED RELEASE ORAL DAILY
Qty: 30 CAPSULE | Refills: 5 | OUTPATIENT
Start: 2023-07-26 | End: 2024-07-25

## 2023-07-26 RX ORDER — FLUOXETINE HYDROCHLORIDE 40 MG/1
40 CAPSULE ORAL DAILY
Qty: 90 CAPSULE | Refills: 3 | OUTPATIENT
Start: 2023-07-26 | End: 2024-07-26

## 2023-07-26 NOTE — TELEPHONE ENCOUNTER
All scripts have already been sent to the [facility] in [place] for either a 6 month supply or 12 month supply

## 2023-09-06 ENCOUNTER — HOSPITAL ENCOUNTER (OUTPATIENT)
Age: 38
Discharge: HOME | End: 2023-09-06
Payer: COMMERCIAL

## 2023-09-06 LAB
POTASSIUM SERPL-SCNC: 4 MMOL/L (ref 3.7–5.3)
TSH SERPL DL<=0.05 MIU/L-ACNC: 15.04 UIU/ML (ref 0.3–5)

## 2023-09-06 PROCEDURE — 84481 FREE ASSAY (FT-3): CPT

## 2023-09-06 PROCEDURE — 84132 ASSAY OF SERUM POTASSIUM: CPT

## 2023-09-06 PROCEDURE — 84443 ASSAY THYROID STIM HORMONE: CPT

## 2023-09-06 PROCEDURE — 86800 THYROGLOBULIN ANTIBODY: CPT

## 2023-09-06 PROCEDURE — 36415 COLL VENOUS BLD VENIPUNCTURE: CPT

## 2023-09-06 PROCEDURE — 84432 ASSAY OF THYROGLOBULIN: CPT

## 2023-09-06 PROCEDURE — 84439 ASSAY OF FREE THYROXINE: CPT

## 2023-09-07 LAB
T3FREE SERPL-MCNC: 2.02 PG/ML (ref 2.02–4.43)
T4 FREE SERPL-MCNC: 1 NG/DL (ref 0.9–1.7)
THYROGLOBULIN: <0.2 NG/ML (ref 0–63.4)

## 2023-09-08 LAB — THYROGLOBULIN AB: 15 IU/ML (ref 0–40)

## 2023-09-13 LAB
MISCELLANEOUS LAB TEST RESULT: ABNORMAL
TEST NAME: ABNORMAL

## 2023-10-06 ENCOUNTER — OFFICE VISIT (OUTPATIENT)
Dept: FAMILY MEDICINE CLINIC | Age: 38
End: 2023-10-06
Payer: COMMERCIAL

## 2023-10-06 VITALS
WEIGHT: 293 LBS | TEMPERATURE: 98.3 F | SYSTOLIC BLOOD PRESSURE: 136 MMHG | HEIGHT: 66 IN | BODY MASS INDEX: 47.09 KG/M2 | DIASTOLIC BLOOD PRESSURE: 86 MMHG | HEART RATE: 69 BPM | OXYGEN SATURATION: 99 %

## 2023-10-06 DIAGNOSIS — E89.0 POSTOPERATIVE HYPOTHYROIDISM: ICD-10-CM

## 2023-10-06 DIAGNOSIS — F41.9 ANXIETY: ICD-10-CM

## 2023-10-06 DIAGNOSIS — F32.9 MAJOR DEPRESSIVE DISORDER WITH CURRENT ACTIVE EPISODE, UNSPECIFIED DEPRESSION EPISODE SEVERITY, UNSPECIFIED WHETHER RECURRENT: Primary | ICD-10-CM

## 2023-10-06 PROCEDURE — 3075F SYST BP GE 130 - 139MM HG: CPT

## 2023-10-06 PROCEDURE — 3079F DIAST BP 80-89 MM HG: CPT

## 2023-10-06 PROCEDURE — 99213 OFFICE O/P EST LOW 20 MIN: CPT

## 2023-10-06 RX ORDER — SCOLOPAMINE TRANSDERMAL SYSTEM 1 MG/1
PATCH, EXTENDED RELEASE TRANSDERMAL
COMMUNITY
Start: 2023-08-17

## 2023-10-06 RX ORDER — LEVOTHYROXINE SODIUM 300 UG/1
300 TABLET ORAL DAILY
COMMUNITY
Start: 2023-09-20

## 2023-10-06 RX ORDER — LEVOTHYROXINE, LIOTHYRONINE 19; 4.5 UG/1; UG/1
30 TABLET ORAL DAILY
COMMUNITY
Start: 2023-09-08

## 2023-10-06 ASSESSMENT — ENCOUNTER SYMPTOMS
WHEEZING: 0
CONSTIPATION: 0
SHORTNESS OF BREATH: 0
COUGH: 0
DIARRHEA: 0
ABDOMINAL DISTENTION: 0

## 2023-10-06 NOTE — PROGRESS NOTES
[facility]  [facility] PRIMARY CARE [address]  [address] [address]  Dept: [phone]        CHIEF COMPLAINT:      Chief Complaint   Patient presents with    Established New Doctor       Anna Mcmahon is a 34 y.o. female who presents to \[facility]\"" care. Used to see Letha Davis NP    Headache clinic-Neurology-for migraines  Dr. Pankaj Kumari for thyroid-had thryoid cancer, had thyroid removed. Weight loss surgery in November. Had rour-en-y and is having a gastric sleeve. Cardiology in the past for abnormal EKG due to poor absorption of synthroid. Has narcolepsy-see [facility] sleep study doc. Dr. Naeem Calixto had several lumps removed from breast in the past.   Anxiety/depression-is on prozac and hydroxyzine for itching and anxiety. Sleep is good. Feels medicines do help. No concerns with medication dosage at this time      OBGYN-had hysterectomy. Is unsure if she has cervix. Not due for pap per November. Did not get labs done from June. Patient states she will get them with next lab draw    Although patient sees many specialties she appears to be organized and on top of her appointments. Review of Systems   Constitutional:  Negative for fatigue. HENT: Negative. Eyes:  Negative for visual disturbance. Respiratory:  Negative for cough, shortness of breath and wheezing. Cardiovascular:  Positive for palpitations (intermittent, related to thyroid). Negative for chest pain and leg swelling. Gastrointestinal:  Negative for abdominal distention, constipation and diarrhea. Endocrine: Negative for polydipsia, polyphagia and polyuria. Genitourinary:  Negative for difficulty urinating and urgency. Musculoskeletal:  Negative for arthralgias and myalgias. Skin: Negative. Neurological: Negative. Hematological:  Does not bruise/bleed easily. Psychiatric/Behavioral:  Negative for agitation. The patient is not nervous/anxious.

## 2023-10-31 ENCOUNTER — HOSPITAL ENCOUNTER (OUTPATIENT)
Age: 38
Discharge: HOME | End: 2023-10-31
Payer: COMMERCIAL

## 2023-10-31 LAB — TSH SERPL DL<=0.05 MIU/L-ACNC: 0.11 UIU/ML (ref 0.3–5)

## 2023-10-31 PROCEDURE — 84481 FREE ASSAY (FT-3): CPT

## 2023-10-31 PROCEDURE — 36415 COLL VENOUS BLD VENIPUNCTURE: CPT

## 2023-10-31 PROCEDURE — 84439 ASSAY OF FREE THYROXINE: CPT

## 2023-10-31 PROCEDURE — 84443 ASSAY THYROID STIM HORMONE: CPT

## 2023-11-01 LAB
T3FREE SERPL-MCNC: 3.53 PG/ML (ref 2.02–4.43)
T4 FREE SERPL-MCNC: 1.4 NG/DL (ref 0.9–1.7)

## 2023-11-14 DIAGNOSIS — F41.9 ANXIETY: ICD-10-CM

## 2023-11-14 DIAGNOSIS — R11.0 NAUSEA: ICD-10-CM

## 2023-11-14 DIAGNOSIS — L40.9 PSORIASIS: ICD-10-CM

## 2023-11-14 DIAGNOSIS — F32.9 MAJOR DEPRESSIVE DISORDER WITH CURRENT ACTIVE EPISODE, UNSPECIFIED DEPRESSION EPISODE SEVERITY, UNSPECIFIED WHETHER RECURRENT: ICD-10-CM

## 2023-11-14 RX ORDER — LANSOPRAZOLE 30 MG/1
30 CAPSULE, DELAYED RELEASE ORAL DAILY
Qty: 90 CAPSULE | Refills: 1 | Status: SHIPPED | OUTPATIENT
Start: 2023-11-14 | End: 2024-11-13

## 2023-11-14 RX ORDER — FLUOXETINE HYDROCHLORIDE 40 MG/1
40 CAPSULE ORAL DAILY
Qty: 90 CAPSULE | Refills: 3 | Status: SHIPPED | OUTPATIENT
Start: 2023-11-14 | End: 2024-11-14

## 2023-11-14 RX ORDER — ONDANSETRON 4 MG/1
TABLET, ORALLY DISINTEGRATING ORAL
Qty: 90 TABLET | Refills: 3 | Status: SHIPPED | OUTPATIENT
Start: 2023-11-14

## 2023-11-14 NOTE — TELEPHONE ENCOUNTER
Anxiety     Bradycardia     Chest pain     Postoperative hypothyroidism     Adenomatous goiter, toxic or with hyperthyroidism     Benign essential hypertension     Fatigue     History of Demetria-en-Y gastric bypass     Iron deficiency anemia     Malignant tumor of thyroid gland (HCC)     Morbid obesity with BMI of 40.0-44.9, adult (HCC)     Dizziness     Hypothyroidism     Body mass index (BMI) 45.0-49.9, adult ([address])

## 2024-01-13 ENCOUNTER — HOSPITAL ENCOUNTER (OUTPATIENT)
Age: 39
Discharge: HOME | End: 2024-01-13
Payer: COMMERCIAL

## 2024-01-13 LAB
POTASSIUM SERPL-SCNC: 3.9 MMOL/L (ref 3.7–5.3)
T3FREE SERPL-MCNC: 3.42 PG/ML (ref 2.02–4.43)
T4 FREE SERPL-MCNC: 1.3 NG/DL (ref 0.9–1.7)
TSH SERPL DL<=0.05 MIU/L-ACNC: 0.17 UIU/ML (ref 0.3–5)

## 2024-01-13 PROCEDURE — 84481 FREE ASSAY (FT-3): CPT

## 2024-01-13 PROCEDURE — 84432 ASSAY OF THYROGLOBULIN: CPT

## 2024-01-13 PROCEDURE — 84132 ASSAY OF SERUM POTASSIUM: CPT

## 2024-01-13 PROCEDURE — 36415 COLL VENOUS BLD VENIPUNCTURE: CPT

## 2024-01-13 PROCEDURE — 84443 ASSAY THYROID STIM HORMONE: CPT

## 2024-01-13 PROCEDURE — 86800 THYROGLOBULIN ANTIBODY: CPT

## 2024-01-13 PROCEDURE — 84439 ASSAY OF FREE THYROXINE: CPT

## 2024-01-16 LAB — THYROGLOBULIN AB: 18 IU/ML (ref 0–40)

## 2024-01-18 LAB — THYROGLOB SERPL-MCNC: <0.5 NG/ML (ref 1.3–31.8)

## 2024-03-19 ENCOUNTER — HOSPITAL ENCOUNTER (OUTPATIENT)
Age: 39
Discharge: HOME | End: 2024-03-19
Payer: COMMERCIAL

## 2024-03-19 LAB
T4 FREE SERPL-MCNC: 1.2 NG/DL (ref 0.93–1.7)
TSH SERPL DL<=0.05 MIU/L-ACNC: 0.01 UIU/ML (ref 0.27–4.2)

## 2024-03-19 PROCEDURE — 84443 ASSAY THYROID STIM HORMONE: CPT

## 2024-03-19 PROCEDURE — 36415 COLL VENOUS BLD VENIPUNCTURE: CPT

## 2024-03-19 PROCEDURE — 84439 ASSAY OF FREE THYROXINE: CPT

## 2024-04-09 ENCOUNTER — OFFICE VISIT (OUTPATIENT)
Dept: FAMILY MEDICINE CLINIC | Age: 39
End: 2024-04-09
Payer: COMMERCIAL

## 2024-04-09 VITALS
WEIGHT: 244 LBS | TEMPERATURE: 97.7 F | OXYGEN SATURATION: 98 % | HEART RATE: 72 BPM | BODY MASS INDEX: 40.65 KG/M2 | SYSTOLIC BLOOD PRESSURE: 112 MMHG | HEIGHT: 65 IN | DIASTOLIC BLOOD PRESSURE: 80 MMHG

## 2024-04-09 DIAGNOSIS — F41.9 ANXIETY: Primary | ICD-10-CM

## 2024-04-09 PROBLEM — C73 MALIGNANT TUMOR OF THYROID GLAND (HCC): Status: RESOLVED | Noted: 2020-03-16 | Resolved: 2024-04-09

## 2024-04-09 PROCEDURE — 99213 OFFICE O/P EST LOW 20 MIN: CPT

## 2024-04-09 PROCEDURE — 3074F SYST BP LT 130 MM HG: CPT

## 2024-04-09 PROCEDURE — 3079F DIAST BP 80-89 MM HG: CPT

## 2024-04-09 RX ORDER — LEVOTHYROXINE SODIUM 0.2 MG/1
200 TABLET ORAL DAILY
COMMUNITY
Start: 2024-03-20

## 2024-04-09 RX ORDER — LEVOTHYROXINE SODIUM 0.03 MG/1
25 TABLET ORAL DAILY
COMMUNITY
Start: 2024-03-20

## 2024-04-09 RX ORDER — CYANOCOBALAMIN 1000 UG/ML
1000 INJECTION, SOLUTION INTRAMUSCULAR; SUBCUTANEOUS
COMMUNITY
Start: 2024-03-05

## 2024-04-09 SDOH — ECONOMIC STABILITY: INCOME INSECURITY: HOW HARD IS IT FOR YOU TO PAY FOR THE VERY BASICS LIKE FOOD, HOUSING, MEDICAL CARE, AND HEATING?: NOT HARD AT ALL

## 2024-04-09 SDOH — ECONOMIC STABILITY: HOUSING INSECURITY
IN THE LAST 12 MONTHS, WAS THERE A TIME WHEN YOU DID NOT HAVE A STEADY PLACE TO SLEEP OR SLEPT IN A SHELTER (INCLUDING NOW)?: NO

## 2024-04-09 SDOH — ECONOMIC STABILITY: FOOD INSECURITY: WITHIN THE PAST 12 MONTHS, YOU WORRIED THAT YOUR FOOD WOULD RUN OUT BEFORE YOU GOT MONEY TO BUY MORE.: NEVER TRUE

## 2024-04-09 SDOH — ECONOMIC STABILITY: FOOD INSECURITY: WITHIN THE PAST 12 MONTHS, THE FOOD YOU BOUGHT JUST DIDN'T LAST AND YOU DIDN'T HAVE MONEY TO GET MORE.: NEVER TRUE

## 2024-04-09 ASSESSMENT — PATIENT HEALTH QUESTIONNAIRE - PHQ9
SUM OF ALL RESPONSES TO PHQ QUESTIONS 1-9: 4
4. FEELING TIRED OR HAVING LITTLE ENERGY: NOT AT ALL
2. FEELING DOWN, DEPRESSED OR HOPELESS: NOT AT ALL
SUM OF ALL RESPONSES TO PHQ9 QUESTIONS 1 & 2: 1
3. TROUBLE FALLING OR STAYING ASLEEP: NOT AT ALL
SUM OF ALL RESPONSES TO PHQ QUESTIONS 1-9: 4
SUM OF ALL RESPONSES TO PHQ QUESTIONS 1-9: 4
7. TROUBLE CONCENTRATING ON THINGS, SUCH AS READING THE NEWSPAPER OR WATCHING TELEVISION: MORE THAN HALF THE DAYS
1. LITTLE INTEREST OR PLEASURE IN DOING THINGS: SEVERAL DAYS
10. IF YOU CHECKED OFF ANY PROBLEMS, HOW DIFFICULT HAVE THESE PROBLEMS MADE IT FOR YOU TO DO YOUR WORK, TAKE CARE OF THINGS AT HOME, OR GET ALONG WITH OTHER PEOPLE: SOMEWHAT DIFFICULT
5. POOR APPETITE OR OVEREATING: NOT AT ALL
9. THOUGHTS THAT YOU WOULD BE BETTER OFF DEAD, OR OF HURTING YOURSELF: NOT AT ALL
8. MOVING OR SPEAKING SO SLOWLY THAT OTHER PEOPLE COULD HAVE NOTICED. OR THE OPPOSITE, BEING SO FIGETY OR RESTLESS THAT YOU HAVE BEEN MOVING AROUND A LOT MORE THAN USUAL: SEVERAL DAYS
6. FEELING BAD ABOUT YOURSELF - OR THAT YOU ARE A FAILURE OR HAVE LET YOURSELF OR YOUR FAMILY DOWN: NOT AT ALL
SUM OF ALL RESPONSES TO PHQ QUESTIONS 1-9: 4

## 2024-04-09 NOTE — PROGRESS NOTES
[facility]  [facility] PRIMARY CARE [address] [address]  Dept: [phone]        CHIEF COMPLAINT:      Chief Complaint   Patient presents with    Anxiety       SUBJECTIVE      Kandance LACI Ralph is a 38 y.o. female who presents anxiety recheck.     Anxiety/depression-is on prozac and hydroxyzine for itching and anxiety. Sleep is good. States anxiety/depression is okay. Feels she has difficulties concentrating on things. Has noticed the last couple weeks. Believes maybe her thyroid medication change could of caused this.     Will need to get A1C. All other labs completed by other specialist    Dr. Romo-for hyperthyroidism - Is currently on 225 mcg of synthroid. Has ultrasound ordered of thyroid through him   [facility] for [facility]olepsy  Neurology for migraines.     Had gastric sleeve in November. States she is feeling okay. Has next appointment in . Have her back on B12 shot. Is still on prevacid for acid reflux.   Does see OBGYN, had hysterectomy in  with removal of cervix.   Has mammogram in May.     Review of Systems     HISTORY:        Past Medical History:   Diagnosis Date    Arrhythmia 2011    Depression 2011    GERD (gastroesophageal reflux disease)     Gastoric presis     Hyperthyroidism     Migraine 2011    Neurologic cardiac syncope 2011    Thyroid cancer (HCC)         Past Surgical History:   Procedure Laterality Date    APPENDECTOMY      BREAST LUMPECTOMY Left     5 lumps removed out of left breast      SECTION      2 C- Sections     GALLBLADDER SURGERY      GASTRIC BYPASS SURGERY      MICHELLE-EN-Y GASTRIC BYPASS N/A     Patient had to get gastric bypass reversed due to Ulcers removed     THYROIDECTOMY Bilateral     whole thyroid removed        Family History   Problem Relation Age of Onset    Thyroid Disease Paternal Grandfather     Migraines Mother     Other Mother 42        uterine fibroids, DEXTER/BSO     High Blood Pressure Father

## 2024-05-21 NOTE — TELEPHONE ENCOUNTER
Received message from central scheduling department stating patient is requesting a sleep aid for night of sleep study.    Patient of Dr. Donahue-Sayed at [facility] Pulmonary [facility] pulmonary clinical staff notified.   Scheduled

## 2024-06-03 ENCOUNTER — HOSPITAL ENCOUNTER (OUTPATIENT)
Age: 39
Discharge: HOME | End: 2024-06-03
Payer: COMMERCIAL

## 2024-06-03 LAB
25(OH)D3 SERPL-MCNC: 34.8 NG/ML (ref 30–100)
ALBUMIN SERPL-MCNC: 4.1 G/DL (ref 3.5–5.2)
ALBUMIN/GLOB SERPL: 2 {RATIO} (ref 1–2.5)
ALP SERPL-CCNC: 103 U/L (ref 35–104)
ALT SERPL-CCNC: 8 U/L (ref 10–35)
AST SERPL-CCNC: 21 U/L (ref 10–35)
BASOPHILS # BLD: 0.04 K/UL (ref 0–0.2)
BASOPHILS NFR BLD: 1 % (ref 0–2)
BILIRUB DIRECT SERPL-MCNC: <0.2 MG/DL (ref 0–0.3)
BILIRUB INDIRECT SERPL-MCNC: ABNORMAL MG/DL (ref 0–1)
BILIRUB SERPL-MCNC: 0.2 MG/DL (ref 0–1.2)
CALCIUM SERPL-MCNC: 8.4 MG/DL (ref 8.6–10.4)
EOSINOPHIL # BLD: 0.09 K/UL (ref 0–0.44)
EOSINOPHILS RELATIVE PERCENT: 1 % (ref 1–4)
ERYTHROCYTE [DISTWIDTH] IN BLOOD BY AUTOMATED COUNT: 13.1 % (ref 11.8–14.4)
FOLATE SERPL-MCNC: 4.9 NG/ML (ref 4.8–24.2)
GLOBULIN SER CALC-MCNC: 2.5 G/DL
HCT VFR BLD AUTO: 34.4 % (ref 36.3–47.1)
HGB BLD-MCNC: 11.5 G/DL (ref 11.9–15.1)
IMM GRANULOCYTES # BLD AUTO: <0.03 K/UL (ref 0–0.3)
IMM GRANULOCYTES NFR BLD: 0 %
IRON SERPL-MCNC: 32 UG/DL (ref 37–145)
LYMPHOCYTES NFR BLD: 1.45 K/UL (ref 1.1–3.7)
LYMPHOCYTES RELATIVE PERCENT: 23 % (ref 24–43)
MCH RBC QN AUTO: 31.2 PG (ref 25.2–33.5)
MCHC RBC AUTO-ENTMCNC: 33.4 G/DL (ref 28.4–34.8)
MCV RBC AUTO: 93.2 FL (ref 82.6–102.9)
MONOCYTES NFR BLD: 0.36 K/UL (ref 0.1–1.2)
MONOCYTES NFR BLD: 6 % (ref 3–12)
NEUTROPHILS NFR BLD: 69 % (ref 36–65)
NEUTS SEG NFR BLD: 4.45 K/UL (ref 1.5–8.1)
NRBC BLD-RTO: 0 PER 100 WBC
PLATELET # BLD AUTO: 284 K/UL (ref 138–453)
PMV BLD AUTO: 9.3 FL (ref 8.1–13.5)
POTASSIUM SERPL-SCNC: 3.3 MMOL/L (ref 3.7–5.3)
PROT SERPL-MCNC: 6.6 G/DL (ref 6.6–8.7)
PTH-INTACT SERPL-MCNC: 73 PG/ML (ref 15–65)
RBC # BLD AUTO: 3.69 M/UL (ref 3.95–5.11)
T3FREE SERPL-MCNC: 1.5 PG/ML (ref 2–4.4)
T4 FREE SERPL-MCNC: 0.7 NG/DL (ref 0.92–1.68)
TSH SERPL DL<=0.05 MIU/L-ACNC: 21.8 UIU/ML (ref 0.27–4.2)
VIT B12 SERPL-MCNC: 456 PG/ML (ref 232–1245)
WBC OTHER # BLD: 6.4 K/UL (ref 3.5–11.3)

## 2024-06-03 PROCEDURE — 84425 ASSAY OF VITAMIN B-1: CPT

## 2024-06-03 PROCEDURE — 84481 FREE ASSAY (FT-3): CPT

## 2024-06-03 PROCEDURE — 83970 ASSAY OF PARATHORMONE: CPT

## 2024-06-03 PROCEDURE — 36415 COLL VENOUS BLD VENIPUNCTURE: CPT

## 2024-06-03 PROCEDURE — 83540 ASSAY OF IRON: CPT

## 2024-06-03 PROCEDURE — 84443 ASSAY THYROID STIM HORMONE: CPT

## 2024-06-03 PROCEDURE — 82746 ASSAY OF FOLIC ACID SERUM: CPT

## 2024-06-03 PROCEDURE — 84439 ASSAY OF FREE THYROXINE: CPT

## 2024-06-03 PROCEDURE — 84132 ASSAY OF SERUM POTASSIUM: CPT

## 2024-06-03 PROCEDURE — 82310 ASSAY OF CALCIUM: CPT

## 2024-06-03 PROCEDURE — 80076 HEPATIC FUNCTION PANEL: CPT

## 2024-06-03 PROCEDURE — 82306 VITAMIN D 25 HYDROXY: CPT

## 2024-06-03 PROCEDURE — 82607 VITAMIN B-12: CPT

## 2024-06-03 PROCEDURE — 85025 COMPLETE CBC W/AUTO DIFF WBC: CPT

## 2024-06-04 DIAGNOSIS — R11.0 NAUSEA: ICD-10-CM

## 2024-06-04 RX ORDER — ONDANSETRON 4 MG/1
TABLET, ORALLY DISINTEGRATING ORAL
Qty: 90 TABLET | Refills: 0 | Status: SHIPPED | OUTPATIENT
Start: 2024-06-04

## 2024-06-04 NOTE — TELEPHONE ENCOUNTER
LOV: 4/9/2024    RTO: No future appointments. Due back around 10/2024  LRF: 11/14/23          Controlled Substance Monitoring:    Acute and Chronic Pain Monitoring:        No data to display

## 2024-06-08 LAB — VIT B1 PYROPHOSHATE BLD-SCNC: 93 NMOL/L (ref 70–180)

## 2024-06-21 ENCOUNTER — APPOINTMENT (OUTPATIENT)
Dept: CT IMAGING | Facility: CLINIC | Age: 39
End: 2024-06-21
Payer: COMMERCIAL

## 2024-06-21 ENCOUNTER — HOSPITAL ENCOUNTER (EMERGENCY)
Facility: CLINIC | Age: 39
Discharge: HOME | End: 2024-06-21
Attending: EMERGENCY MEDICINE
Payer: COMMERCIAL

## 2024-06-21 ENCOUNTER — APPOINTMENT (OUTPATIENT)
Dept: GENERAL RADIOLOGY | Facility: CLINIC | Age: 39
End: 2024-06-21
Payer: COMMERCIAL

## 2024-06-21 VITALS
WEIGHT: 225 LBS | OXYGEN SATURATION: 99 % | TEMPERATURE: 99 F | DIASTOLIC BLOOD PRESSURE: 87 MMHG | BODY MASS INDEX: 37.49 KG/M2 | HEART RATE: 78 BPM | RESPIRATION RATE: 16 BRPM | HEIGHT: 65 IN | SYSTOLIC BLOOD PRESSURE: 154 MMHG

## 2024-06-21 DIAGNOSIS — S20.212A CONTUSION OF LEFT CHEST WALL, INITIAL ENCOUNTER: ICD-10-CM
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
## 2024-06-21 DIAGNOSIS — S16.1XXA ACUTE STRAIN OF NECK MUSCLE, INITIAL ENCOUNTER: ICD-10-CM

## 2024-06-21 DIAGNOSIS — E87.6 HYPOKALEMIA: ICD-10-CM

## 2024-06-21 DIAGNOSIS — S40.012A CONTUSION OF LEFT SHOULDER, INITIAL ENCOUNTER: ICD-10-CM

## 2024-06-21 LAB
ANION GAP SERPL CALCULATED.3IONS-SCNC: 9 MMOL/L (ref 9–17)
BASOPHILS # BLD: 0.1 K/UL (ref 0–0.2)
BASOPHILS NFR BLD: 1 % (ref 0–2)
BUN SERPL-MCNC: 13 MG/DL (ref 6–20)
CALCIUM SERPL-MCNC: 8.6 MG/DL (ref 8.6–10.4)
CHLORIDE SERPL-SCNC: 100 MMOL/L (ref 98–107)
CO2 SERPL-SCNC: 29 MMOL/L (ref 20–31)
CREAT SERPL-MCNC: 1 MG/DL (ref 0.5–0.9)
EOSINOPHIL # BLD: 0 K/UL (ref 0–0.4)
EOSINOPHILS RELATIVE PERCENT: 1 % (ref 1–4)
ERYTHROCYTE [DISTWIDTH] IN BLOOD BY AUTOMATED COUNT: 13.2 % (ref 12.5–15.4)
GFR, ESTIMATED: 73 ML/MIN/1.73M2
GLUCOSE SERPL-MCNC: 110 MG/DL (ref 70–99)
HCT VFR BLD AUTO: 33.9 % (ref 36–46)
HGB BLD-MCNC: 11.9 G/DL (ref 12–16)
LYMPHOCYTES NFR BLD: 1.3 K/UL (ref 1–4.8)
LYMPHOCYTES RELATIVE PERCENT: 16 % (ref 24–44)
MCH RBC QN AUTO: 31.5 PG (ref 26–34)
MCHC RBC AUTO-ENTMCNC: 35.2 G/DL (ref 31–37)
MCV RBC AUTO: 89.4 FL (ref 80–100)
MONOCYTES NFR BLD: 0.5 K/UL (ref 0.1–1.2)
MONOCYTES NFR BLD: 6 % (ref 2–11)
NEUTROPHILS NFR BLD: 76 % (ref 36–66)
NEUTS SEG NFR BLD: 5.9 K/UL (ref 1.8–7.7)
PLATELET # BLD AUTO: 307 K/UL (ref 140–450)
PMV BLD AUTO: 6.6 FL (ref 6–12)
POTASSIUM SERPL-SCNC: 3.3 MMOL/L (ref 3.7–5.3)
RBC # BLD AUTO: 3.79 M/UL (ref 4–5.2)
SODIUM SERPL-SCNC: 138 MMOL/L (ref 135–144)
WBC OTHER # BLD: 7.8 K/UL (ref 3.5–11)

## 2024-06-21 PROCEDURE — 96375 TX/PRO/DX INJ NEW DRUG ADDON: CPT

## 2024-06-21 PROCEDURE — 99285 EMERGENCY DEPT VISIT HI MDM: CPT

## 2024-06-21 PROCEDURE — 96374 THER/PROPH/DIAG INJ IV PUSH: CPT

## 2024-06-21 PROCEDURE — 71260 CT THORAX DX C+: CPT

## 2024-06-21 PROCEDURE — 6370000000 HC RX 637 (ALT 250 FOR IP): Performed by: NURSE PRACTITIONER

## 2024-06-21 PROCEDURE — 96372 THER/PROPH/DIAG INJ SC/IM: CPT

## 2024-06-21 PROCEDURE — 6360000004 HC RX CONTRAST MEDICATION: Performed by: NURSE PRACTITIONER

## 2024-06-21 PROCEDURE — 36415 COLL VENOUS BLD VENIPUNCTURE: CPT

## 2024-06-21 PROCEDURE — 70450 CT HEAD/BRAIN W/O DYE: CPT

## 2024-06-21 PROCEDURE — 73030 X-RAY EXAM OF SHOULDER: CPT

## 2024-06-21 PROCEDURE — 2580000003 HC RX 258: Performed by: NURSE PRACTITIONER

## 2024-06-21 PROCEDURE — 6360000002 HC RX W HCPCS: Performed by: NURSE PRACTITIONER

## 2024-06-21 PROCEDURE — 85025 COMPLETE CBC W/AUTO DIFF WBC: CPT

## 2024-06-21 PROCEDURE — 80048 BASIC METABOLIC PNL TOTAL CA: CPT

## 2024-06-21 PROCEDURE — 72125 CT NECK SPINE W/O DYE: CPT

## 2024-06-21 PROCEDURE — 72128 CT CHEST SPINE W/O DYE: CPT

## 2024-06-21 RX ORDER — CYCLOBENZAPRINE HCL 10 MG
10 TABLET ORAL 3 TIMES DAILY PRN
Qty: 30 TABLET | Refills: 0 | Status: SHIPPED | OUTPATIENT
Start: 2024-06-21 | End: 2024-07-01

## 2024-06-21 RX ORDER — ONDANSETRON 2 MG/ML
4 INJECTION INTRAMUSCULAR; INTRAVENOUS ONCE
Status: COMPLETED | OUTPATIENT
Start: 2024-06-21 | End: 2024-06-21

## 2024-06-21 RX ORDER — ORPHENADRINE CITRATE 30 MG/ML
60 INJECTION INTRAMUSCULAR; INTRAVENOUS ONCE
Status: COMPLETED | OUTPATIENT
Start: 2024-06-21 | End: 2024-06-21

## 2024-06-21 RX ORDER — SODIUM CHLORIDE 0.9 % (FLUSH) 0.9 %
10 SYRINGE (ML) INJECTION PRN
Status: DISCONTINUED | OUTPATIENT
Start: 2024-06-21 | End: 2024-06-21 | Stop reason: HOSPADM

## 2024-06-21 RX ORDER — POTASSIUM CHLORIDE 20 MEQ/1
40 TABLET, EXTENDED RELEASE ORAL ONCE
Status: COMPLETED | OUTPATIENT
Start: 2024-06-21 | End: 2024-06-21

## 2024-06-21 RX ORDER — 0.9 % SODIUM CHLORIDE 0.9 %
70 INTRAVENOUS SOLUTION INTRAVENOUS ONCE
Status: DISCONTINUED | OUTPATIENT
Start: 2024-06-21 | End: 2024-06-21 | Stop reason: HOSPADM

## 2024-06-21 RX ADMIN — ORPHENADRINE CITRATE 60 MG: 60 INJECTION INTRAMUSCULAR; INTRAVENOUS at 20:35

## 2024-06-21 RX ADMIN — Medication 70 ML: at 19:24

## 2024-06-21 RX ADMIN — IOPAMIDOL 75 ML: 755 INJECTION, SOLUTION INTRAVENOUS at 19:24

## 2024-06-21 RX ADMIN — HYDROMORPHONE HYDROCHLORIDE 0.5 MG: 1 INJECTION, SOLUTION INTRAMUSCULAR; INTRAVENOUS; SUBCUTANEOUS at 18:54

## 2024-06-21 RX ADMIN — POTASSIUM CHLORIDE 40 MEQ: 1500 TABLET, EXTENDED RELEASE ORAL at 20:34

## 2024-06-21 RX ADMIN — ONDANSETRON 4 MG: 2 INJECTION INTRAMUSCULAR; INTRAVENOUS at 18:52

## 2024-06-21 RX ADMIN — SODIUM CHLORIDE, PRESERVATIVE FREE 10 ML: 5 INJECTION INTRAVENOUS at 19:24

## 2024-06-21 ASSESSMENT — ENCOUNTER SYMPTOMS
COUGH: 0
SINUS PAIN: 0
VOMITING: 0
SHORTNESS OF BREATH: 0
DIARRHEA: 0
SORE THROAT: 0
ABDOMINAL PAIN: 0
NAUSEA: 0

## 2024-06-21 ASSESSMENT — LIFESTYLE VARIABLES
HOW OFTEN DO YOU HAVE A DRINK CONTAINING ALCOHOL: NEVER
HOW MANY STANDARD DRINKS CONTAINING ALCOHOL DO YOU HAVE ON A TYPICAL DAY: PATIENT DOES NOT DRINK

## 2024-06-21 ASSESSMENT — PAIN SCALES - GENERAL: PAINLEVEL_OUTOF10: 10

## 2024-06-22 NOTE — DISCHARGE INSTR - COC
Continuity of Care Form    Patient Name: Kandance L Miller   :  1985  MRN:  [id number]    Admit date:  2024  Discharge date:  ***    Code Status Order: Prior   Advance Directives:     Admitting Physician:  No admitting provider for patient encounter.  PCP: Mireille Sharp APRN - CNP    Discharging Nurse: ***  Discharging Hospital Unit/Room#: WHITNEY/WHITNEY  Discharging Unit Phone Number: ***    Emergency Contact:   Extended Emergency Contact Information  Primary Emergency Contact: Armida Oro  Address: n/a   [facility]  Home Phone: [phone]  Work Phone: [phone]  Mobile Phone: [phone]  Relation: Brother/Sister    Past Surgical History:  Past Surgical History:   Procedure Laterality Date    APPENDECTOMY      BREAST LUMPECTOMY Left     5 lumps removed out of left breast      SECTION      2 C- Sections     GALLBLADDER SURGERY      GASTRIC BYPASS SURGERY      MICHELLE-EN-Y GASTRIC BYPASS N/A     Patient had to get gastric bypass reversed due to Ulcers removed     THYROIDECTOMY Bilateral     whole thyroid removed        Immunization History:   Immunization History   Administered Date(s) Administered    Botulinum Antitoxin 07/10/2018, 10/18/2018, 2019, 2019    Influenza Virus Vaccine 2007, 2018, 2019    TDaP, ADACEL (age 10y-64y), BOOSTRIX (age 10y+), IM, 0.5mL 2015, 2022       Active Problems:  Patient Active Problem List   Diagnosis Code    Depression F32.A    Arrhythmia I49.9    Neurogenic syncope R55    Migraine G43.909    Encounter to establish care Z76.89    Vitamin D deficiency E55.9    Elevated fasting glucose R73.01    Hypokalemia E87.6    Anxiety F41.9    Bradycardia R00.1    Chest pain R07.9    Postoperative hypothyroidism E89.0    Adenomatous goiter, toxic or with hyperthyroidism E05.20    Benign essential hypertension I10    Fatigue R53.83    History of Michelle-en-Y gastric bypass Z98.84    Iron deficiency anemia D50.9    Morbid

## 2024-06-22 NOTE — DISCHARGE INSTRUCTIONS
Take muscle relaxants as prescribed, continue your potassium as prescribed, follow-up for potassium recheck.  Follow-up with primary care for follow-up in 2 to 3 days, monitor for any change in neurologic status or additional signs of injury.  Return to the ER with any new or worsening symptoms
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
## 2024-06-24 ENCOUNTER — OFFICE VISIT (OUTPATIENT)
Dept: FAMILY MEDICINE CLINIC | Age: 39
End: 2024-06-24
Payer: COMMERCIAL

## 2024-06-24 ENCOUNTER — HOSPITAL ENCOUNTER (OUTPATIENT)
Age: 39
Discharge: HOME | End: 2024-06-24
Payer: COMMERCIAL

## 2024-06-24 VITALS
BODY MASS INDEX: 39.82 KG/M2 | HEART RATE: 63 BPM | WEIGHT: 239 LBS | TEMPERATURE: 97.9 F | SYSTOLIC BLOOD PRESSURE: 124 MMHG | OXYGEN SATURATION: 99 % | DIASTOLIC BLOOD PRESSURE: 78 MMHG | HEIGHT: 65 IN

## 2024-06-24 DIAGNOSIS — M25.512 ACUTE PAIN OF LEFT SHOULDER: Primary | ICD-10-CM
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
## 2024-06-24 LAB
POTASSIUM SERPL-SCNC: 3.8 MMOL/L (ref 3.7–5.3)
T3FREE SERPL-MCNC: 1.8 PG/ML (ref 2–4.4)
T4 FREE SERPL-MCNC: 0.7 NG/DL (ref 0.92–1.68)
TSH SERPL DL<=0.05 MIU/L-ACNC: 5.23 UIU/ML (ref 0.3–5)

## 2024-06-24 PROCEDURE — 36415 COLL VENOUS BLD VENIPUNCTURE: CPT

## 2024-06-24 PROCEDURE — 84481 FREE ASSAY (FT-3): CPT

## 2024-06-24 PROCEDURE — 84439 ASSAY OF FREE THYROXINE: CPT

## 2024-06-24 PROCEDURE — 84132 ASSAY OF SERUM POTASSIUM: CPT

## 2024-06-24 PROCEDURE — 3074F SYST BP LT 130 MM HG: CPT | Performed by: REGISTERED NURSE

## 2024-06-24 PROCEDURE — 99214 OFFICE O/P EST MOD 30 MIN: CPT | Performed by: REGISTERED NURSE

## 2024-06-24 PROCEDURE — 84443 ASSAY THYROID STIM HORMONE: CPT

## 2024-06-24 PROCEDURE — 3078F DIAST BP <80 MM HG: CPT | Performed by: REGISTERED NURSE

## 2024-06-24 RX ORDER — FERROUS SULFATE 325(65) MG
325 TABLET ORAL
COMMUNITY
Start: 2024-06-06

## 2024-06-24 RX ORDER — ATOGEPANT 60 MG/1
60 TABLET ORAL
COMMUNITY
Start: 2024-06-16

## 2024-06-24 RX ORDER — LIDOCAINE 50 MG/G
1 PATCH TOPICAL DAILY
Qty: 10 PATCH | Refills: 0 | Status: SHIPPED | OUTPATIENT
Start: 2024-06-24 | End: 2024-07-04

## 2024-06-24 ASSESSMENT — PATIENT HEALTH QUESTIONNAIRE - PHQ9
9. THOUGHTS THAT YOU WOULD BE BETTER OFF DEAD, OR OF HURTING YOURSELF: NOT AT ALL
SUM OF ALL RESPONSES TO PHQ QUESTIONS 1-9: 0
5. POOR APPETITE OR OVEREATING: NOT AT ALL
6. FEELING BAD ABOUT YOURSELF - OR THAT YOU ARE A FAILURE OR HAVE LET YOURSELF OR YOUR FAMILY DOWN: NOT AT ALL
SUM OF ALL RESPONSES TO PHQ9 QUESTIONS 1 & 2: 0
4. FEELING TIRED OR HAVING LITTLE ENERGY: NOT AT ALL
1. LITTLE INTEREST OR PLEASURE IN DOING THINGS: NOT AT ALL
SUM OF ALL RESPONSES TO PHQ QUESTIONS 1-9: 0
7. TROUBLE CONCENTRATING ON THINGS, SUCH AS READING THE NEWSPAPER OR WATCHING TELEVISION: NOT AT ALL
SUM OF ALL RESPONSES TO PHQ QUESTIONS 1-9: 0
SUM OF ALL RESPONSES TO PHQ QUESTIONS 1-9: 0
3. TROUBLE FALLING OR STAYING ASLEEP: NOT AT ALL
8. MOVING OR SPEAKING SO SLOWLY THAT OTHER PEOPLE COULD HAVE NOTICED. OR THE OPPOSITE, BEING SO FIGETY OR RESTLESS THAT YOU HAVE BEEN MOVING AROUND A LOT MORE THAN USUAL: NOT AT ALL
10. IF YOU CHECKED OFF ANY PROBLEMS, HOW DIFFICULT HAVE THESE PROBLEMS MADE IT FOR YOU TO DO YOUR WORK, TAKE CARE OF THINGS AT HOME, OR GET ALONG WITH OTHER PEOPLE: NOT DIFFICULT AT ALL
2. FEELING DOWN, DEPRESSED OR HOPELESS: NOT AT ALL

## 2024-06-24 ASSESSMENT — ANXIETY QUESTIONNAIRES

## 2024-06-24 ASSESSMENT — ENCOUNTER SYMPTOMS
CHEST TIGHTNESS: 0
SHORTNESS OF BREATH: 0

## 2024-06-24 NOTE — PROGRESS NOTES
Patient Gets Stomach Ulcers    Shrimp (Diagnostic) Hives    Shrimp Extract Hives       RESULTS:      No results found for this visit on 06/24/24.   Admission on 06/21/2024, Discharged on 06/21/2024   Component Date Value Ref Range Status    WBC 06/21/2024 7.8  3.5 - 11.0 k/uL Final    RBC 06/21/2024 3.79 (L)  4.0 - 5.2 m/uL Final    Hemoglobin 06/21/2024 11.9 (L)  12.0 - 16.0 g/dL Final    Hematocrit 06/21/2024 33.9 (L)  36 - 46 % Final    MCV 06/21/2024 89.4  80 - 100 fL Final    MCH 06/21/2024 31.5  26 - 34 pg Final    MCHC 06/21/2024 35.2  31 - 37 g/dL Final    RDW 06/21/2024 13.2  12.5 - 15.4 % Final    Platelets 06/21/2024 307  140 - 450 k/uL Final    MPV 06/21/2024 6.6  6.0 - 12.0 fL Final    Neutrophils % 06/21/2024 76 (H)  36 - 66 % Final    Lymphocytes % 06/21/2024 16 (L)  24 - 44 % Final    Monocytes % 06/21/2024 6  2 - 11 % Final    Eosinophils % 06/21/2024 1  1 - 4 % Final    Basophils % 06/21/2024 1  0 - 2 % Final    Neutrophils Absolute 06/21/2024 5.90  1.8 - 7.7 k/uL Final    Lymphocytes Absolute 06/21/2024 1.30  1.0 - 4.8 k/uL Final    Monocytes Absolute 06/21/2024 0.50  0.1 - 1.2 k/uL Final    Eosinophils Absolute 06/21/2024 0.00  0.0 - 0.4 k/uL Final    Basophils Absolute 06/21/2024 0.10  0.0 - 0.2 k/uL Final    Sodium 06/21/2024 138  135 - 144 mmol/L Final    Potassium 06/21/2024 3.3 (L)  3.7 - 5.3 mmol/L Final    Chloride 06/21/2024 100  98 - 107 mmol/L Final    CO2 06/21/2024 29  20 - 31 mmol/L Final    Anion Gap 06/21/2024 9  9 - 17 mmol/L Final    Glucose 06/21/2024 110 (H)  70 - 99 mg/dL Final    BUN 06/21/2024 13  6 - 20 mg/dL Final    Creatinine 06/21/2024 1.0 (H)  0.5 - 0.9 mg/dL Final    Est, Glom Filt Rate 06/21/2024 73  >60 mL/min/1.73m2 Final    Comment:       These results are not intended for use in patients <18 years of age.        eGFR results are calculated without a race factor using the 2021 CKD-EPI equation.  Careful clinical correlation is recommended, particularly when

## 2024-07-01 ENCOUNTER — OFFICE VISIT (OUTPATIENT)
Dept: FAMILY MEDICINE CLINIC | Age: 39
End: 2024-07-01
Payer: COMMERCIAL

## 2024-07-01 VITALS
HEART RATE: 61 BPM | WEIGHT: 239 LBS | SYSTOLIC BLOOD PRESSURE: 124 MMHG | OXYGEN SATURATION: 98 % | TEMPERATURE: 98.1 F | BODY MASS INDEX: 39.82 KG/M2 | HEIGHT: 65 IN | DIASTOLIC BLOOD PRESSURE: 84 MMHG

## 2024-07-01 DIAGNOSIS — Z13.220 SCREENING FOR LIPID DISORDERS: ICD-10-CM

## 2024-07-01 DIAGNOSIS — Z13.1 SCREENING FOR DIABETES MELLITUS: ICD-10-CM

## 2024-07-01 DIAGNOSIS — Z13.21 ENCOUNTER FOR VITAMIN DEFICIENCY SCREENING: ICD-10-CM

## 2024-07-01 DIAGNOSIS — Z09 FOLLOW-UP EXAM: Primary | ICD-10-CM

## 2024-07-01 DIAGNOSIS — Z13.0 SCREENING FOR DEFICIENCY ANEMIA: ICD-10-CM

## 2024-07-01 DIAGNOSIS — R60.9 PAROTID SWELLING: ICD-10-CM

## 2024-07-01 PROCEDURE — 3079F DIAST BP 80-89 MM HG: CPT | Performed by: REGISTERED NURSE

## 2024-07-01 PROCEDURE — 99213 OFFICE O/P EST LOW 20 MIN: CPT | Performed by: REGISTERED NURSE

## 2024-07-01 PROCEDURE — 3074F SYST BP LT 130 MM HG: CPT | Performed by: REGISTERED NURSE

## 2024-07-01 ASSESSMENT — ANXIETY QUESTIONNAIRES
1. FEELING NERVOUS, ANXIOUS, OR ON EDGE: NOT AT ALL
4. TROUBLE RELAXING: NOT AT ALL
3. WORRYING TOO MUCH ABOUT DIFFERENT THINGS: NOT AT ALL
IF YOU CHECKED OFF ANY PROBLEMS ON THIS QUESTIONNAIRE, HOW DIFFICULT HAVE THESE PROBLEMS MADE IT FOR YOU TO DO YOUR WORK, TAKE CARE OF THINGS AT HOME, OR GET ALONG WITH OTHER PEOPLE: NOT DIFFICULT AT ALL
6. BECOMING EASILY ANNOYED OR IRRITABLE: NOT AT ALL
7. FEELING AFRAID AS IF SOMETHING AWFUL MIGHT HAPPEN: NOT AT ALL
2. NOT BEING ABLE TO STOP OR CONTROL WORRYING: NOT AT ALL
5. BEING SO RESTLESS THAT IT IS HARD TO SIT STILL: NOT AT ALL
GAD7 TOTAL SCORE: 0

## 2024-07-01 ASSESSMENT — PATIENT HEALTH QUESTIONNAIRE - PHQ9
2. FEELING DOWN, DEPRESSED OR HOPELESS: NOT AT ALL
9. THOUGHTS THAT YOU WOULD BE BETTER OFF DEAD, OR OF HURTING YOURSELF: NOT AT ALL
4. FEELING TIRED OR HAVING LITTLE ENERGY: NOT AT ALL
SUM OF ALL RESPONSES TO PHQ QUESTIONS 1-9: 0
7. TROUBLE CONCENTRATING ON THINGS, SUCH AS READING THE NEWSPAPER OR WATCHING TELEVISION: NOT AT ALL
6. FEELING BAD ABOUT YOURSELF - OR THAT YOU ARE A FAILURE OR HAVE LET YOURSELF OR YOUR FAMILY DOWN: NOT AT ALL
SUM OF ALL RESPONSES TO PHQ QUESTIONS 1-9: 0
1. LITTLE INTEREST OR PLEASURE IN DOING THINGS: NOT AT ALL
5. POOR APPETITE OR OVEREATING: NOT AT ALL
8. MOVING OR SPEAKING SO SLOWLY THAT OTHER PEOPLE COULD HAVE NOTICED. OR THE OPPOSITE, BEING SO FIGETY OR RESTLESS THAT YOU HAVE BEEN MOVING AROUND A LOT MORE THAN USUAL: NOT AT ALL
SUM OF ALL RESPONSES TO PHQ QUESTIONS 1-9: 0
10. IF YOU CHECKED OFF ANY PROBLEMS, HOW DIFFICULT HAVE THESE PROBLEMS MADE IT FOR YOU TO DO YOUR WORK, TAKE CARE OF THINGS AT HOME, OR GET ALONG WITH OTHER PEOPLE: NOT DIFFICULT AT ALL
3. TROUBLE FALLING OR STAYING ASLEEP: NOT AT ALL
SUM OF ALL RESPONSES TO PHQ9 QUESTIONS 1 & 2: 0
SUM OF ALL RESPONSES TO PHQ QUESTIONS 1-9: 0

## 2024-07-01 ASSESSMENT — ENCOUNTER SYMPTOMS: SHORTNESS OF BREATH: 0
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
## 2024-07-11 ENCOUNTER — HOSPITAL ENCOUNTER (OUTPATIENT)
Dept: ULTRASOUND IMAGING | Age: 39
Discharge: HOME | End: 2024-07-13
Payer: COMMERCIAL

## 2024-07-11 ENCOUNTER — HOSPITAL ENCOUNTER (OUTPATIENT)
Age: 39
Discharge: HOME | End: 2024-07-11
Payer: COMMERCIAL

## 2024-07-11 DIAGNOSIS — R60.9 PAROTID SWELLING: ICD-10-CM

## 2024-07-11 LAB
ALBUMIN SERPL-MCNC: 4.5 G/DL (ref 3.5–5.2)
ALBUMIN/GLOB SERPL: 2 {RATIO} (ref 1–2.5)
ALP SERPL-CCNC: 81 U/L (ref 35–104)
ALT SERPL-CCNC: 12 U/L (ref 10–35)
ANION GAP SERPL CALCULATED.3IONS-SCNC: 13 MMOL/L (ref 9–16)
AST SERPL-CCNC: 22 U/L (ref 10–35)
BASOPHILS # BLD: 0.08 K/UL (ref 0–0.2)
BASOPHILS NFR BLD: 1 % (ref 0–2)
BILIRUB SERPL-MCNC: 0.3 MG/DL (ref 0–1.2)
BUN SERPL-MCNC: 10 MG/DL (ref 6–20)
CALCIUM SERPL-MCNC: 8.9 MG/DL (ref 8.6–10.4)
CHLORIDE SERPL-SCNC: 101 MMOL/L (ref 98–107)
CO2 SERPL-SCNC: 28 MMOL/L (ref 20–31)
CREAT SERPL-MCNC: 1.1 MG/DL (ref 0.5–0.9)
EOSINOPHIL # BLD: 0.13 K/UL (ref 0–0.44)
EOSINOPHILS RELATIVE PERCENT: 2 % (ref 1–4)
ERYTHROCYTE [DISTWIDTH] IN BLOOD BY AUTOMATED COUNT: 12.2 % (ref 11.8–14.4)
FERRITIN SERPL-MCNC: 27 NG/ML (ref 13–150)
FOLATE SERPL-MCNC: 5.5 NG/ML (ref 4.8–24.2)
GFR, ESTIMATED: 62 ML/MIN/1.73M2
GLUCOSE SERPL-MCNC: 74 MG/DL (ref 74–99)
HCT VFR BLD AUTO: 39.1 % (ref 36.3–47.1)
HGB BLD-MCNC: 12.9 G/DL (ref 11.9–15.1)
IMM GRANULOCYTES # BLD AUTO: <0.03 K/UL (ref 0–0.3)
IMM GRANULOCYTES NFR BLD: 0 %
IRON SATN MFR SERPL: 24 % (ref 20–55)
IRON SERPL-MCNC: 75 UG/DL (ref 37–145)
LYMPHOCYTES NFR BLD: 1.5 K/UL (ref 1.1–3.7)
LYMPHOCYTES RELATIVE PERCENT: 25 % (ref 24–43)
MCH RBC QN AUTO: 30.2 PG (ref 25.2–33.5)
MCHC RBC AUTO-ENTMCNC: 33 G/DL (ref 28.4–34.8)
MCV RBC AUTO: 91.6 FL (ref 82.6–102.9)
MONOCYTES NFR BLD: 0.42 K/UL (ref 0.1–1.2)
MONOCYTES NFR BLD: 7 % (ref 3–12)
NEUTROPHILS NFR BLD: 65 % (ref 36–65)
NEUTS SEG NFR BLD: 3.87 K/UL (ref 1.5–8.1)
NRBC BLD-RTO: 0 PER 100 WBC
PLATELET # BLD AUTO: 440 K/UL (ref 138–453)
PMV BLD AUTO: 9.4 FL (ref 8.1–13.5)
POTASSIUM SERPL-SCNC: 3.5 MMOL/L (ref 3.7–5.3)
PROT SERPL-MCNC: 7.3 G/DL (ref 6.6–8.7)
RBC # BLD AUTO: 4.27 M/UL (ref 3.95–5.11)
SODIUM SERPL-SCNC: 142 MMOL/L (ref 136–145)
TIBC SERPL-MCNC: 315 UG/DL (ref 250–450)
TRANSFERRIN SERPL-MCNC: 254 MG/DL (ref 200–360)
UNSATURATED IRON BINDING CAPACITY: 240 UG/DL (ref 112–347)
VIT B12 SERPL-MCNC: 524 PG/ML (ref 232–1245)
WBC OTHER # BLD: 6 K/UL (ref 3.5–11.3)

## 2024-07-11 PROCEDURE — 80053 COMPREHEN METABOLIC PANEL: CPT

## 2024-07-11 PROCEDURE — 82728 ASSAY OF FERRITIN: CPT

## 2024-07-11 PROCEDURE — 85025 COMPLETE CBC W/AUTO DIFF WBC: CPT

## 2024-07-11 PROCEDURE — 82746 ASSAY OF FOLIC ACID SERUM: CPT

## 2024-07-11 PROCEDURE — 84466 ASSAY OF TRANSFERRIN: CPT

## 2024-07-11 PROCEDURE — 76536 US EXAM OF HEAD AND NECK: CPT

## 2024-07-11 PROCEDURE — 36415 COLL VENOUS BLD VENIPUNCTURE: CPT

## 2024-07-11 PROCEDURE — 82607 VITAMIN B-12: CPT

## 2024-07-11 PROCEDURE — 83540 ASSAY OF IRON: CPT

## 2024-07-11 PROCEDURE — 83550 IRON BINDING TEST: CPT

## 2024-08-06 DIAGNOSIS — F41.9 ANXIETY: ICD-10-CM

## 2024-08-06 DIAGNOSIS — R11.0 NAUSEA: ICD-10-CM

## 2024-08-06 DIAGNOSIS — L40.9 PSORIASIS: ICD-10-CM

## 2024-08-06 DIAGNOSIS — F32.9 MAJOR DEPRESSIVE DISORDER WITH CURRENT ACTIVE EPISODE, UNSPECIFIED DEPRESSION EPISODE SEVERITY, UNSPECIFIED WHETHER RECURRENT: ICD-10-CM

## 2024-08-07 RX ORDER — LANSOPRAZOLE 30 MG/1
30 CAPSULE, DELAYED RELEASE ORAL DAILY
Qty: 90 CAPSULE | Refills: 1 | Status: SHIPPED | OUTPATIENT
Start: 2024-08-07 | End: 2025-08-07

## 2024-08-07 RX ORDER — FLUOCINONIDE TOPICAL SOLUTION USP, 0.05% 0.5 MG/ML
SOLUTION TOPICAL 2 TIMES DAILY
Qty: 60 ML | Refills: 5 | Status: SHIPPED | OUTPATIENT
Start: 2024-08-07

## 2024-08-07 RX ORDER — FLUOXETINE 40 MG/1
40 CAPSULE ORAL DAILY
Qty: 90 CAPSULE | Refills: 3 | Status: SHIPPED | OUTPATIENT
Start: 2024-08-07 | End: 2025-08-08

## 2024-08-07 RX ORDER — ONDANSETRON 4 MG/1
TABLET, ORALLY DISINTEGRATING ORAL
Qty: 90 TABLET | Refills: 0 | Status: SHIPPED | OUTPATIENT
Start: 2024-08-07

## 2024-08-07 NOTE — TELEPHONE ENCOUNTER
LOV 7/1/24   RTO in Dec, Mychart message sent   LRF 11/14/23          Controlled Substance Monitoring:    Acute and Chronic Pain Monitoring:        No data to display

## 2024-08-07 NOTE — TELEPHONE ENCOUNTER
LOV 7/1/24   RTO in Dec, Mychart message sent   LRF 6/1/23          Controlled Substance Monitoring:    Acute and Chronic Pain Monitoring:        No data to display

## 2024-08-26 ENCOUNTER — HOSPITAL ENCOUNTER (OUTPATIENT)
Age: 39
Discharge: HOME | End: 2024-08-26
Payer: COMMERCIAL

## 2024-08-26 LAB
CALCIUM SERPL-MCNC: 8.5 MG/DL (ref 8.6–10.4)
POTASSIUM SERPL-SCNC: 3.9 MMOL/L (ref 3.7–5.3)
T3FREE SERPL-MCNC: 2.6 PG/ML (ref 2–4.4)
T4 FREE SERPL-MCNC: 0.8 NG/DL (ref 0.92–1.68)
TSH SERPL DL<=0.05 MIU/L-ACNC: 1.19 UIU/ML (ref 0.27–4.2)

## 2024-08-26 PROCEDURE — 84481 FREE ASSAY (FT-3): CPT

## 2024-08-26 PROCEDURE — 36415 COLL VENOUS BLD VENIPUNCTURE: CPT

## 2024-08-26 PROCEDURE — 84439 ASSAY OF FREE THYROXINE: CPT

## 2024-08-26 PROCEDURE — 84443 ASSAY THYROID STIM HORMONE: CPT

## 2024-08-26 PROCEDURE — 84132 ASSAY OF SERUM POTASSIUM: CPT

## 2024-08-26 PROCEDURE — 82310 ASSAY OF CALCIUM: CPT

## 2024-08-28 DIAGNOSIS — R11.0 NAUSEA: ICD-10-CM

## 2024-08-28 RX ORDER — ONDANSETRON 4 MG/1
TABLET, ORALLY DISINTEGRATING ORAL
Qty: 90 TABLET | Refills: 11 | OUTPATIENT
Start: 2024-08-28

## 2024-08-28 NOTE — TELEPHONE ENCOUNTER
Last Visit:  7/1/2024     Next Visit Date:  Future Appointments   Date Time Provider Department Center   12/9/2024  3:30 PM Alfa Salazar APRN - CNP [facility]

## 2024-12-31 ENCOUNTER — HOSPITAL ENCOUNTER (OUTPATIENT)
Age: 39
Discharge: HOME | End: 2024-12-31
Payer: COMMERCIAL

## 2024-12-31 LAB — POTASSIUM SERPL-SCNC: 3.1 MMOL/L (ref 3.7–5.3)

## 2024-12-31 PROCEDURE — 36415 COLL VENOUS BLD VENIPUNCTURE: CPT

## 2024-12-31 PROCEDURE — 84132 ASSAY OF SERUM POTASSIUM: CPT

## 2025-02-04 ENCOUNTER — OFFICE VISIT (OUTPATIENT)
Dept: FAMILY MEDICINE CLINIC | Age: 40
End: 2025-02-04
Payer: COMMERCIAL

## 2025-02-04 VITALS
HEIGHT: 65 IN | HEART RATE: 88 BPM | SYSTOLIC BLOOD PRESSURE: 128 MMHG | WEIGHT: 249 LBS | TEMPERATURE: 97 F | BODY MASS INDEX: 41.48 KG/M2 | OXYGEN SATURATION: 100 % | DIASTOLIC BLOOD PRESSURE: 82 MMHG

## 2025-02-04 DIAGNOSIS — R11.0 NAUSEA: ICD-10-CM

## 2025-02-04 DIAGNOSIS — J01.10 ACUTE NON-RECURRENT FRONTAL SINUSITIS: ICD-10-CM

## 2025-02-04 DIAGNOSIS — Z12.31 ENCOUNTER FOR SCREENING MAMMOGRAM FOR MALIGNANT NEOPLASM OF BREAST: ICD-10-CM

## 2025-02-04 DIAGNOSIS — Z13.220 SCREENING FOR LIPID DISORDERS: ICD-10-CM

## 2025-02-04 DIAGNOSIS — Z00.00 ENCOUNTER FOR WELL ADULT EXAM WITHOUT ABNORMAL FINDINGS: Primary | ICD-10-CM

## 2025-02-04 DIAGNOSIS — F41.9 ANXIETY: ICD-10-CM

## 2025-02-04 DIAGNOSIS — R73.01 IFG (IMPAIRED FASTING GLUCOSE): ICD-10-CM

## 2025-02-04 DIAGNOSIS — F32.9 MAJOR DEPRESSIVE DISORDER WITH CURRENT ACTIVE EPISODE, UNSPECIFIED DEPRESSION EPISODE SEVERITY, UNSPECIFIED WHETHER RECURRENT: ICD-10-CM

## 2025-02-04 DIAGNOSIS — C73 PAPILLARY CARCINOMA OF THYROID (HCC): ICD-10-CM

## 2025-02-04 DIAGNOSIS — E55.9 VITAMIN D INSUFFICIENCY: ICD-10-CM

## 2025-02-04 PROBLEM — Z71.3 DIETARY COUNSELING AND SURVEILLANCE: Status: ACTIVE | Noted: 2024-01-15

## 2025-02-04 PROBLEM — K31.84 GASTROPARESIS: Status: RESOLVED | Noted: 2020-07-09 | Resolved: 2025-02-04

## 2025-02-04 PROBLEM — S46.819A STRAIN OF TRAPEZIUS MUSCLE: Status: RESOLVED | Noted: 2020-03-16 | Resolved: 2025-02-04

## 2025-02-04 PROBLEM — K11.5 PAROTID SIALOLITHIASIS: Status: ACTIVE | Noted: 2020-03-16

## 2025-02-04 PROBLEM — E83.42 HYPOMAGNESEMIA: Status: ACTIVE | Noted: 2020-03-16

## 2025-02-04 PROBLEM — N93.9 ABNORMAL UTERINE BLEEDING: Status: RESOLVED | Noted: 2020-03-16 | Resolved: 2025-02-04

## 2025-02-04 PROBLEM — R76.0 RAISED ANTIBODY TITER: Status: ACTIVE | Noted: 2020-03-16

## 2025-02-04 PROBLEM — K58.1 IRRITABLE BOWEL SYNDROME WITH CONSTIPATION: Status: ACTIVE | Noted: 2020-04-28

## 2025-02-04 PROBLEM — G50.0 TRIGEMINAL NEURALGIA: Status: ACTIVE | Noted: 2019-04-22

## 2025-02-04 PROBLEM — R19.7 DIARRHEA: Status: RESOLVED | Noted: 2018-07-05 | Resolved: 2025-02-04

## 2025-02-04 PROBLEM — M54.2 NECK PAIN: Status: ACTIVE | Noted: 2020-03-16

## 2025-02-04 PROBLEM — Z90.3 HISTORY OF SLEEVE GASTRECTOMY: Status: ACTIVE | Noted: 2024-02-28

## 2025-02-04 PROBLEM — I87.8 POOR VENOUS ACCESS: Status: ACTIVE | Noted: 2019-10-11

## 2025-02-04 PROBLEM — G47.419 NARCOLEPSY WITHOUT CATAPLEXY: Status: ACTIVE | Noted: 2020-03-16

## 2025-02-04 PROBLEM — E16.2 HYPOGLYCEMIA: Status: ACTIVE | Noted: 2020-03-16

## 2025-02-04 PROBLEM — Z09 POSTOPERATIVE FOLLOW-UP: Status: ACTIVE | Noted: 2024-01-03

## 2025-02-04 PROBLEM — K28.9 GASTROJEJUNAL ULCER WITHOUT HEMORRHAGE, PERFORATION, OR OBSTRUCTION: Status: RESOLVED | Noted: 2020-03-16 | Resolved: 2025-02-04

## 2025-02-04 PROBLEM — J30.2 SEASONAL ALLERGIES: Status: ACTIVE | Noted: 2020-03-16

## 2025-02-04 PROBLEM — K27.9 PEPTIC ULCER: Status: ACTIVE | Noted: 2018-07-05

## 2025-02-04 PROBLEM — E65 ABDOMINAL PANNUS: Status: ACTIVE | Noted: 2018-08-03

## 2025-02-04 PROBLEM — H61.90 DISORDER OF EXTERNAL EAR: Status: RESOLVED | Noted: 2020-03-16 | Resolved: 2025-02-04

## 2025-02-04 PROBLEM — K28.9 ANASTOMOTIC ULCER: Status: ACTIVE | Noted: 2017-02-24

## 2025-02-04 PROBLEM — R76.8 SCL-70 ANTIBODY POSITIVE: Status: ACTIVE | Noted: 2020-03-16

## 2025-02-04 PROBLEM — R63.0 NO APPETITE: Status: RESOLVED | Noted: 2020-03-16 | Resolved: 2025-02-04

## 2025-02-04 PROBLEM — Z71.3 NUTRITIONAL COUNSELING: Status: ACTIVE | Noted: 2024-01-15

## 2025-02-04 PROBLEM — R39.15 URINARY URGENCY: Status: RESOLVED | Noted: 2020-03-16 | Resolved: 2025-02-04

## 2025-02-04 PROBLEM — N93.8 DYSFUNCTIONAL UTERINE BLEEDING: Status: RESOLVED | Noted: 2020-03-16 | Resolved: 2025-02-04

## 2025-02-04 PROBLEM — N64.52 DISCHARGE FROM LEFT NIPPLE: Status: RESOLVED | Noted: 2018-07-05 | Resolved: 2025-02-04

## 2025-02-04 PROBLEM — R93.89 ABNORMAL COMPUTED TOMOGRAPHY SCAN: Status: ACTIVE | Noted: 2020-03-16

## 2025-02-04 PROBLEM — S46.819D: Status: RESOLVED | Noted: 2020-03-16 | Resolved: 2025-02-04

## 2025-02-04 PROBLEM — R23.3 SPONTANEOUS ECCHYMOSIS: Status: ACTIVE | Noted: 2020-03-16

## 2025-02-04 PROBLEM — G43.109 OCULAR MIGRAINE: Status: ACTIVE | Noted: 2020-03-16

## 2025-02-04 PROBLEM — K11.20 PAROTITIS: Status: RESOLVED | Noted: 2019-04-22 | Resolved: 2025-02-04

## 2025-02-04 PROBLEM — R07.0 PAIN IN THROAT: Status: RESOLVED | Noted: 2024-01-07 | Resolved: 2025-02-04

## 2025-02-04 PROBLEM — K21.9 GASTROESOPHAGEAL REFLUX DISEASE WITHOUT ESOPHAGITIS: Status: ACTIVE | Noted: 2023-02-21

## 2025-02-04 PROBLEM — G43.719 INTRACTABLE CHRONIC MIGRAINE WITHOUT AURA: Status: ACTIVE | Noted: 2020-03-16

## 2025-02-04 PROBLEM — F33.0 MILD RECURRENT MAJOR DEPRESSION (HCC): Status: ACTIVE | Noted: 2020-03-16

## 2025-02-04 PROBLEM — R10.13 EPIGASTRIC PAIN: Status: RESOLVED | Noted: 2017-01-05 | Resolved: 2025-02-04

## 2025-02-04 PROBLEM — L98.499 ULCER OF EXTERNAL EAR (HCC): Status: RESOLVED | Noted: 2020-03-16 | Resolved: 2025-02-04

## 2025-02-04 PROBLEM — E61.1 IRON DEFICIENCY: Status: ACTIVE | Noted: 2020-03-16

## 2025-02-04 PROBLEM — F41.0 PANIC DISORDER: Status: ACTIVE | Noted: 2020-03-16

## 2025-02-04 PROBLEM — K64.4 PROLAPSED EXTERNAL HEMORRHOIDS: Status: ACTIVE | Noted: 2018-07-05

## 2025-02-04 PROBLEM — D37.030 NEOPLASM OF UNCERTAIN BEHAVIOR OF PAROTID GLAND: Status: ACTIVE | Noted: 2019-04-22

## 2025-02-04 PROBLEM — G43.819 MIGRAINE VARIANT, INTRACTABLE: Status: ACTIVE | Noted: 2020-03-16

## 2025-02-04 PROBLEM — J02.9 VIRAL PHARYNGITIS: Status: RESOLVED | Noted: 2024-01-07 | Resolved: 2025-02-04

## 2025-02-04 PROBLEM — J30.9 ALLERGIC RHINITIS: Status: ACTIVE | Noted: 2019-10-11

## 2025-02-04 PROCEDURE — 3074F SYST BP LT 130 MM HG: CPT | Performed by: REGISTERED NURSE

## 2025-02-04 PROCEDURE — 99396 PREV VISIT EST AGE 40-64: CPT | Performed by: REGISTERED NURSE

## 2025-02-04 PROCEDURE — 3079F DIAST BP 80-89 MM HG: CPT | Performed by: REGISTERED NURSE

## 2025-02-04 RX ORDER — AZITHROMYCIN 250 MG/1
TABLET, FILM COATED ORAL
Qty: 6 TABLET | Refills: 0 | Status: SHIPPED | OUTPATIENT
Start: 2025-02-04 | End: 2025-02-14

## 2025-02-04 RX ORDER — FLUOXETINE 40 MG/1
40 CAPSULE ORAL DAILY
Qty: 90 CAPSULE | Refills: 3 | Status: SHIPPED | OUTPATIENT
Start: 2025-02-04 | End: 2026-02-05

## 2025-02-04 RX ORDER — ONDANSETRON 4 MG/1
TABLET, ORALLY DISINTEGRATING ORAL
Qty: 90 TABLET | Refills: 0 | Status: SHIPPED | OUTPATIENT
Start: 2025-02-04

## 2025-02-04 RX ORDER — ERGOCALCIFEROL 1.25 MG/1
50000 CAPSULE, LIQUID FILLED ORAL WEEKLY
Qty: 12 CAPSULE | Refills: 3 | Status: SHIPPED | OUTPATIENT
Start: 2025-02-04 | End: 2026-02-04

## 2025-02-04 SDOH — ECONOMIC STABILITY: FOOD INSECURITY: WITHIN THE PAST 12 MONTHS, THE FOOD YOU BOUGHT JUST DIDN'T LAST AND YOU DIDN'T HAVE MONEY TO GET MORE.: NEVER TRUE

## 2025-02-04 SDOH — ECONOMIC STABILITY: FOOD INSECURITY: WITHIN THE PAST 12 MONTHS, YOU WORRIED THAT YOUR FOOD WOULD RUN OUT BEFORE YOU GOT MONEY TO BUY MORE.: NEVER TRUE

## 2025-02-04 ASSESSMENT — PATIENT HEALTH QUESTIONNAIRE - PHQ9
SUM OF ALL RESPONSES TO PHQ QUESTIONS 1-9: 0
2. FEELING DOWN, DEPRESSED OR HOPELESS: NOT AT ALL
4. FEELING TIRED OR HAVING LITTLE ENERGY: NOT AT ALL
3. TROUBLE FALLING OR STAYING ASLEEP: NOT AT ALL
10. IF YOU CHECKED OFF ANY PROBLEMS, HOW DIFFICULT HAVE THESE PROBLEMS MADE IT FOR YOU TO DO YOUR WORK, TAKE CARE OF THINGS AT HOME, OR GET ALONG WITH OTHER PEOPLE: NOT DIFFICULT AT ALL
8. MOVING OR SPEAKING SO SLOWLY THAT OTHER PEOPLE COULD HAVE NOTICED. OR THE OPPOSITE, BEING SO FIGETY OR RESTLESS THAT YOU HAVE BEEN MOVING AROUND A LOT MORE THAN USUAL: NOT AT ALL
SUM OF ALL RESPONSES TO PHQ QUESTIONS 1-9: 0
5. POOR APPETITE OR OVEREATING: NOT AT ALL
SUM OF ALL RESPONSES TO PHQ QUESTIONS 1-9: 0
1. LITTLE INTEREST OR PLEASURE IN DOING THINGS: NOT AT ALL
6. FEELING BAD ABOUT YOURSELF - OR THAT YOU ARE A FAILURE OR HAVE LET YOURSELF OR YOUR FAMILY DOWN: NOT AT ALL
SUM OF ALL RESPONSES TO PHQ9 QUESTIONS 1 & 2: 0
SUM OF ALL RESPONSES TO PHQ QUESTIONS 1-9: 0
9. THOUGHTS THAT YOU WOULD BE BETTER OFF DEAD, OR OF HURTING YOURSELF: NOT AT ALL
7. TROUBLE CONCENTRATING ON THINGS, SUCH AS READING THE NEWSPAPER OR WATCHING TELEVISION: NOT AT ALL

## 2025-02-04 ASSESSMENT — ENCOUNTER SYMPTOMS
SINUS PRESSURE: 1
GASTROINTESTINAL NEGATIVE: 1
SINUS PAIN: 1
SHORTNESS OF BREATH: 0
RHINORRHEA: 1
COUGH: 0

## 2025-02-04 NOTE — PROGRESS NOTES
Well Adult Note  Name: Kandance L Miller Today’s Date: 2025   MRN: [id number] Sex: Female   Age: 39 y.o. Ethnicity: Non- / Non    : 1985 Race: White (non-)      Kandance L Miller is here for a well adult exam.          Assessment & Plan  1. Sinusitis.  She reports sinus pain on the right side and green mucus since last night. There is no fever, cough, or shortness of breath. She will continue using Flonase despite her dislike for it. A prescription for Z-Danny will be sent to [facility] pharmacy.    2. Iron Deficiency Anemia.  She has a history of iron deficiency anemia and has received one infusion from Dr. Lugo. Her iron levels are currently stable with a saturation of 30%.    3. Gastroesophageal Reflux Disease (GERD).  She has a history of GERD and ulcers but reports no current epigastric pain.    4. Hypokalemia.  Her potassium level is low at 3.1. She is currently on potassium 40 mg three times a day. A CMP will be done tomorrow to reassess her potassium levels.    5. Psoriasis.  She reports severe psoriasis primarily on her scalp, back, chest, and now spreading to her legs. She is using cream and oil and is considering immunosuppressive shots. She will discuss the risks of these shots, including potential cancer recurrence, with Dr. Cook.    6. Migraine.  She experiences vomiting when she has migraines. Refills for Zofran and Prozac will be sent to [facility].    7. Trigeminal Neuralgia.  She reports improvement in her trigeminal neuralgia symptoms since receiving Botox injections.    8. Hypothyroidism.  She had her thyroid removed due to cancer and underwent radiation therapy. She continues to follow up with her thyroid doctor, who is also monitoring her potassium levels.    9. Health Maintenance.  She will undergo diabetes screening and lipid profile tests. Her mammogram is scheduled for May 2025. Refills for vitamin D will be sent to [facility].    Encounter for well

## 2025-02-10 ENCOUNTER — HOSPITAL ENCOUNTER (OUTPATIENT)
Age: 40
Discharge: HOME | End: 2025-02-10
Payer: COMMERCIAL

## 2025-02-10 DIAGNOSIS — Z13.220 SCREENING FOR LIPID DISORDERS: ICD-10-CM

## 2025-02-10 DIAGNOSIS — R73.01 IFG (IMPAIRED FASTING GLUCOSE): ICD-10-CM

## 2025-02-10 LAB
ALBUMIN SERPL-MCNC: 4.1 G/DL (ref 3.5–5.2)
ALBUMIN SERPL-MCNC: 4.1 G/DL (ref 3.5–5.2)
ALBUMIN/GLOB SERPL: 1.5 {RATIO} (ref 1–2.5)
ALBUMIN/GLOB SERPL: 1.5 {RATIO} (ref 1–2.5)
ALP SERPL-CCNC: 98 U/L (ref 35–104)
ALP SERPL-CCNC: 98 U/L (ref 35–104)
ALT SERPL-CCNC: 12 U/L (ref 10–35)
ALT SERPL-CCNC: 14 U/L (ref 10–35)
ANION GAP SERPL CALCULATED.3IONS-SCNC: 10 MMOL/L (ref 9–16)
ANION GAP SERPL CALCULATED.3IONS-SCNC: 9 MMOL/L (ref 9–16)
AST SERPL-CCNC: 20 U/L (ref 10–35)
AST SERPL-CCNC: 22 U/L (ref 10–35)
BASOPHILS # BLD: 0.05 K/UL (ref 0–0.2)
BASOPHILS NFR BLD: 1 % (ref 0–2)
BILIRUB SERPL-MCNC: 0.4 MG/DL (ref 0–1.2)
BILIRUB SERPL-MCNC: 0.4 MG/DL (ref 0–1.2)
BUN SERPL-MCNC: 14 MG/DL (ref 6–20)
BUN SERPL-MCNC: 14 MG/DL (ref 6–20)
CALCIUM SERPL-MCNC: 9.1 MG/DL (ref 8.6–10.4)
CALCIUM SERPL-MCNC: 9.2 MG/DL (ref 8.6–10.4)
CHLORIDE SERPL-SCNC: 101 MMOL/L (ref 98–107)
CHLORIDE SERPL-SCNC: 102 MMOL/L (ref 98–107)
CHOLEST SERPL-MCNC: 171 MG/DL (ref 0–199)
CHOLESTEROL/HDL RATIO: 3.3
CO2 SERPL-SCNC: 31 MMOL/L (ref 20–31)
CO2 SERPL-SCNC: 31 MMOL/L (ref 20–31)
CREAT SERPL-MCNC: 1 MG/DL (ref 0.6–0.9)
CREAT SERPL-MCNC: 1 MG/DL (ref 0.6–0.9)
EOSINOPHIL # BLD: 0.14 K/UL (ref 0–0.44)
EOSINOPHILS RELATIVE PERCENT: 3 % (ref 1–4)
ERYTHROCYTE [DISTWIDTH] IN BLOOD BY AUTOMATED COUNT: 11.9 % (ref 11.8–14.4)
EST. AVERAGE GLUCOSE BLD GHB EST-MCNC: 91 MG/DL
FERRITIN SERPL-MCNC: 185 NG/ML (ref 15–150)
FOLATE SERPL-MCNC: 7.1 NG/ML (ref 4.8–24.2)
GFR, ESTIMATED: 73 ML/MIN/1.73M2
GFR, ESTIMATED: 73 ML/MIN/1.73M2
GLUCOSE P FAST SERPL-MCNC: 85 MG/DL (ref 74–99)
GLUCOSE SERPL-MCNC: 84 MG/DL (ref 74–99)
HAPTOGLOB SERPL-MCNC: 105 MG/DL (ref 30–200)
HBA1C MFR BLD: 4.8 % (ref 4–6)
HCT VFR BLD AUTO: 37.9 % (ref 36.3–47.1)
HDLC SERPL-MCNC: 52 MG/DL
HGB BLD-MCNC: 12.6 G/DL (ref 11.9–15.1)
IMM GRANULOCYTES # BLD AUTO: <0.03 K/UL (ref 0–0.3)
IMM GRANULOCYTES NFR BLD: 0 %
IMM RETICS NFR: 3.5 % (ref 2.7–18.3)
IRON SATN MFR SERPL: 43 % (ref 20–55)
IRON SERPL-MCNC: 107 UG/DL (ref 37–145)
LDLC SERPL CALC-MCNC: 103 MG/DL (ref 0–100)
LYMPHOCYTES NFR BLD: 1.72 K/UL (ref 1.1–3.7)
LYMPHOCYTES RELATIVE PERCENT: 33 % (ref 24–43)
MAGNESIUM SERPL-MCNC: 2.1 MG/DL (ref 1.6–2.6)
MCH RBC QN AUTO: 30 PG (ref 25.2–33.5)
MCHC RBC AUTO-ENTMCNC: 33.2 G/DL (ref 28.4–34.8)
MCV RBC AUTO: 90.2 FL (ref 82.6–102.9)
MONOCYTES NFR BLD: 0.35 K/UL (ref 0.1–1.2)
MONOCYTES NFR BLD: 7 % (ref 3–12)
NEUTROPHILS NFR BLD: 56 % (ref 36–65)
NEUTS SEG NFR BLD: 2.89 K/UL (ref 1.5–8.1)
NRBC BLD-RTO: 0 PER 100 WBC
PLATELET # BLD AUTO: 377 K/UL (ref 138–453)
PMV BLD AUTO: 8.7 FL (ref 8.1–13.5)
POTASSIUM SERPL-SCNC: 3.5 MMOL/L (ref 3.7–5.3)
POTASSIUM SERPL-SCNC: 3.6 MMOL/L (ref 3.7–5.3)
PROT SERPL-MCNC: 6.8 G/DL (ref 6.6–8.7)
PROT SERPL-MCNC: 6.9 G/DL (ref 6.6–8.7)
RBC # BLD AUTO: 4.2 M/UL (ref 3.95–5.11)
RETIC HEMOGLOBIN: 34.7 PG (ref 28.2–35.7)
RETICS # AUTO: 0.06 M/UL (ref 0.03–0.08)
RETICS/RBC NFR AUTO: 1.4 % (ref 0.5–1.9)
SODIUM SERPL-SCNC: 141 MMOL/L (ref 136–145)
SODIUM SERPL-SCNC: 143 MMOL/L (ref 136–145)
TIBC SERPL-MCNC: 246 UG/DL (ref 250–450)
TRIGL SERPL-MCNC: 81 MG/DL (ref 0–149)
UNSATURATED IRON BINDING CAPACITY: 139 UG/DL (ref 112–347)
VIT B12 SERPL-MCNC: 379 PG/ML (ref 232–1245)
VLDLC SERPL CALC-MCNC: 16 MG/DL (ref 1–30)
WBC OTHER # BLD: 5.2 K/UL (ref 3.5–11.3)

## 2025-02-10 PROCEDURE — 83735 ASSAY OF MAGNESIUM: CPT

## 2025-02-10 PROCEDURE — 82728 ASSAY OF FERRITIN: CPT

## 2025-02-10 PROCEDURE — 82607 VITAMIN B-12: CPT

## 2025-02-10 PROCEDURE — 86334 IMMUNOFIX E-PHORESIS SERUM: CPT

## 2025-02-10 PROCEDURE — 80061 LIPID PANEL: CPT

## 2025-02-10 PROCEDURE — 85045 AUTOMATED RETICULOCYTE COUNT: CPT

## 2025-02-10 PROCEDURE — 82668 ASSAY OF ERYTHROPOIETIN: CPT

## 2025-02-10 PROCEDURE — 83036 HEMOGLOBIN GLYCOSYLATED A1C: CPT

## 2025-02-10 PROCEDURE — 83550 IRON BINDING TEST: CPT

## 2025-02-10 PROCEDURE — 80053 COMPREHEN METABOLIC PANEL: CPT

## 2025-02-10 PROCEDURE — 82746 ASSAY OF FOLIC ACID SERUM: CPT

## 2025-02-10 PROCEDURE — 36415 COLL VENOUS BLD VENIPUNCTURE: CPT

## 2025-02-10 PROCEDURE — 85025 COMPLETE CBC W/AUTO DIFF WBC: CPT

## 2025-02-10 PROCEDURE — 83540 ASSAY OF IRON: CPT

## 2025-02-10 PROCEDURE — 83010 ASSAY OF HAPTOGLOBIN QUANT: CPT

## 2025-02-11 LAB
ITYP INTERPRETATION: NORMAL
PATH REV BLD -IMP: NORMAL
PATH REV: NORMAL
SURGICAL PATHOLOGY REPORT: NORMAL

## 2025-02-12 LAB — EPO SERPL-ACNC: 8 MU/ML (ref 4–27)

## 2025-03-03 DIAGNOSIS — L40.9 PSORIASIS: ICD-10-CM

## 2025-03-03 DIAGNOSIS — R11.0 NAUSEA: ICD-10-CM

## 2025-03-03 RX ORDER — ONDANSETRON 4 MG/1
TABLET, ORALLY DISINTEGRATING ORAL
Qty: 90 TABLET | Refills: 11 | Status: SHIPPED | OUTPATIENT
Start: 2025-03-03

## 2025-03-03 RX ORDER — LANSOPRAZOLE 30 MG/1
30 CAPSULE, DELAYED RELEASE ORAL DAILY
Qty: 90 CAPSULE | Refills: 3 | Status: SHIPPED | OUTPATIENT
Start: 2025-03-03

## 2025-03-03 NOTE — TELEPHONE ENCOUNTER
LOV 2/4/25   RTO ---  LRF 2/4/25 zofran, 8/7/24 prevacid          Controlled Substance Monitoring:    Acute and Chronic Pain Monitoring:        No data to display                       DISPLAY PLAN FREE TEXT

## 2025-03-06 PROBLEM — Z09 POSTOPERATIVE FOLLOW-UP: Status: RESOLVED | Noted: 2024-01-03 | Resolved: 2025-03-06

## 2025-03-19 ENCOUNTER — OFFICE VISIT (OUTPATIENT)
Dept: FAMILY MEDICINE CLINIC | Age: 40
End: 2025-03-19
Payer: COMMERCIAL

## 2025-03-19 VITALS
HEART RATE: 67 BPM | DIASTOLIC BLOOD PRESSURE: 88 MMHG | OXYGEN SATURATION: 99 % | HEIGHT: 66 IN | TEMPERATURE: 97.8 F | SYSTOLIC BLOOD PRESSURE: 158 MMHG | BODY MASS INDEX: 36 KG/M2 | WEIGHT: 224 LBS

## 2025-03-19 DIAGNOSIS — M54.81 OCCIPITAL NEURALGIA OF LEFT SIDE: Primary | ICD-10-CM

## 2025-03-19 PROCEDURE — 3077F SYST BP >= 140 MM HG: CPT | Performed by: REGISTERED NURSE

## 2025-03-19 PROCEDURE — 99214 OFFICE O/P EST MOD 30 MIN: CPT | Performed by: REGISTERED NURSE

## 2025-03-19 PROCEDURE — 3079F DIAST BP 80-89 MM HG: CPT | Performed by: REGISTERED NURSE

## 2025-03-19 RX ORDER — GABAPENTIN 100 MG/1
100 CAPSULE ORAL 2 TIMES DAILY
Qty: 20 CAPSULE | Refills: 0 | Status: SHIPPED | OUTPATIENT
Start: 2025-03-19 | End: 2025-03-29

## 2025-03-19 NOTE — PROGRESS NOTES
SUMAtriptan Succinate 6 MG/0.5ML SOAJ Inject as directed as needed       Armodafinil 250 MG TABS Take by mouth daily.      pantoprazole (PROTONIX) 40 MG tablet Take 1 tablet by mouth nightly      Vitamin D (CHOLECALCIFEROL) 1000 UNITS CAPS capsule Take 4 capsules by mouth 2 times daily      Magnesium Oxide 500 MG TABS Take 500 mg by mouth 2 times daily       No current facility-administered medications for this visit.       ALLERGIES:      Allergies   Allergen Reactions    Amoxicillin-Pot Clavulanate Hives     Other reaction(s): rash    Amoxil [Amoxicillin] Hives    Cefdinir Hives and Rash           Frovatriptan Hives           Metoclopramide Other (See Comments)     Other reaction(s): JITTERY/ELEVATED BP        Morphine Itching    Nsaids Other (See Comments)     Patient Gets Stomach Ulcers    Shrimp (Diagnostic) Hives    Shrimp Extract Hives       RESULTS:     Results      No results found for this visit on 03/19/25.   Hospital Outpatient Visit on 02/10/2025   Component Date Value Ref Range Status    Erythropoietin 02/10/2025 8  4 - 27 mU/mL Final    Comment: (NOTE)  INTERPRETIVE INFORMATION: Erythropoietin  Normal serum concentrations of erythropoietin for 95% of   individuals with normal hematocrits range from 4-27 mU/mL.  As the hematocrit is lowered by iron deficiency, aplastic, or   hemolytic anemia, the concentration of erythropoietin increases as   shown in the graph below. In the absence of anemia, elevated   concentrations are seen in renal tumors, as a manifestation of   renal transplant rejection, and in secondary polycythemia. Low   values may be observed in hemochromatosis.       Expected Erythropoietin Concentrations in Patients                    with Uncomplicated Anemia    Erythropoietin (mU/mL)               100,000 - +                         +                10,000 - +.......                         + .......                 1,000 - +    .......                         +     ........

## 2025-03-24 ENCOUNTER — HOSPITAL ENCOUNTER (OUTPATIENT)
Age: 40
Discharge: HOME | End: 2025-03-24
Payer: COMMERCIAL

## 2025-03-24 LAB
POTASSIUM SERPL-SCNC: 4 MMOL/L (ref 3.7–5.3)
T3FREE SERPL-MCNC: 3.11 PG/ML (ref 2–4.4)
T4 FREE SERPL-MCNC: 1.4 NG/DL (ref 0.92–1.68)
TSH SERPL DL<=0.05 MIU/L-ACNC: 0.22 UIU/ML (ref 0.27–4.2)

## 2025-03-24 PROCEDURE — 36415 COLL VENOUS BLD VENIPUNCTURE: CPT

## 2025-03-24 PROCEDURE — 86800 THYROGLOBULIN ANTIBODY: CPT

## 2025-03-24 PROCEDURE — 84432 ASSAY OF THYROGLOBULIN: CPT

## 2025-03-24 PROCEDURE — 84132 ASSAY OF SERUM POTASSIUM: CPT

## 2025-03-24 PROCEDURE — 84481 FREE ASSAY (FT-3): CPT

## 2025-03-24 PROCEDURE — 84439 ASSAY OF FREE THYROXINE: CPT

## 2025-03-24 PROCEDURE — 84443 ASSAY THYROID STIM HORMONE: CPT

## 2025-03-25 LAB — THYROGLOBULIN AB: 14 IU/ML (ref 0–40)

## 2025-03-27 ENCOUNTER — HOSPITAL ENCOUNTER (OUTPATIENT)
Age: 40
Discharge: HOME | End: 2025-03-27
Payer: COMMERCIAL

## 2025-03-27 LAB
ALBUMIN SERPL-MCNC: 4.1 G/DL (ref 3.5–5.2)
ALBUMIN/GLOB SERPL: 1.6 {RATIO} (ref 1–2.5)
ALP SERPL-CCNC: 92 U/L (ref 35–104)
ALT SERPL-CCNC: 13 U/L (ref 10–35)
ANION GAP SERPL CALCULATED.3IONS-SCNC: 10 MMOL/L (ref 9–16)
AST SERPL-CCNC: 19 U/L (ref 10–35)
BASOPHILS # BLD: 0.05 K/UL (ref 0–0.2)
BASOPHILS NFR BLD: 1 % (ref 0–2)
BILIRUB SERPL-MCNC: 0.3 MG/DL (ref 0–1.2)
BUN SERPL-MCNC: 13 MG/DL (ref 6–20)
CALCIUM SERPL-MCNC: 9 MG/DL (ref 8.6–10.4)
CHLORIDE SERPL-SCNC: 100 MMOL/L (ref 98–107)
CO2 SERPL-SCNC: 28 MMOL/L (ref 20–31)
CREAT SERPL-MCNC: 1.2 MG/DL (ref 0.6–0.9)
EOSINOPHIL # BLD: 0.07 K/UL (ref 0–0.44)
EOSINOPHILS RELATIVE PERCENT: 1 % (ref 1–4)
ERYTHROCYTE [DISTWIDTH] IN BLOOD BY AUTOMATED COUNT: 11.9 % (ref 11.8–14.4)
GFR, ESTIMATED: 58 ML/MIN/1.73M2
GLUCOSE SERPL-MCNC: 81 MG/DL (ref 74–99)
HCT VFR BLD AUTO: 35.2 % (ref 36.3–47.1)
HGB BLD-MCNC: 11.7 G/DL (ref 11.9–15.1)
IMM GRANULOCYTES # BLD AUTO: <0.03 K/UL (ref 0–0.3)
IMM GRANULOCYTES NFR BLD: 0 %
LYMPHOCYTES NFR BLD: 1.73 K/UL (ref 1.1–3.7)
LYMPHOCYTES RELATIVE PERCENT: 34 % (ref 24–43)
MCH RBC QN AUTO: 29.6 PG (ref 25.2–33.5)
MCHC RBC AUTO-ENTMCNC: 33.2 G/DL (ref 28.4–34.8)
MCV RBC AUTO: 89.1 FL (ref 82.6–102.9)
MONOCYTES NFR BLD: 0.33 K/UL (ref 0.1–1.2)
MONOCYTES NFR BLD: 7 % (ref 3–12)
NEUTROPHILS NFR BLD: 57 % (ref 36–65)
NEUTS SEG NFR BLD: 2.84 K/UL (ref 1.5–8.1)
NRBC BLD-RTO: 0 PER 100 WBC
PLATELET # BLD AUTO: 363 K/UL (ref 138–453)
PMV BLD AUTO: 9 FL (ref 8.1–13.5)
POTASSIUM SERPL-SCNC: 3.7 MMOL/L (ref 3.7–5.3)
PROT SERPL-MCNC: 6.6 G/DL (ref 6.6–8.7)
RBC # BLD AUTO: 3.95 M/UL (ref 3.95–5.11)
SODIUM SERPL-SCNC: 138 MMOL/L (ref 136–145)
THYROGLOB SERPL-MCNC: <0.5 NG/ML (ref 1.3–31.8)
WBC OTHER # BLD: 5 K/UL (ref 3.5–11.3)

## 2025-03-27 PROCEDURE — 36415 COLL VENOUS BLD VENIPUNCTURE: CPT

## 2025-03-27 PROCEDURE — 80053 COMPREHEN METABOLIC PANEL: CPT

## 2025-03-27 PROCEDURE — 86480 TB TEST CELL IMMUN MEASURE: CPT

## 2025-03-27 PROCEDURE — 85025 COMPLETE CBC W/AUTO DIFF WBC: CPT

## 2025-03-29 LAB
QUANTI TB GOLD PLUS: NEGATIVE
QUANTI TB1 MINUS NIL: 0.01 IU/ML
QUANTI TB2 MINUS NIL: 0.01 IU/ML
QUANTIFERON MITOGEN: 9.96 IU/ML
QUANTIFERON NIL: 0.04 IU/ML

## 2025-04-03 ENCOUNTER — HOSPITAL ENCOUNTER (OUTPATIENT)
Dept: MRI IMAGING | Age: 40
Discharge: HOME | End: 2025-04-05
Payer: COMMERCIAL

## 2025-04-03 DIAGNOSIS — M54.81 OCCIPITAL NEURALGIA OF LEFT SIDE: ICD-10-CM

## 2025-04-03 PROCEDURE — 70553 MRI BRAIN STEM W/O & W/DYE: CPT

## 2025-04-03 PROCEDURE — A9579 GAD-BASE MR CONTRAST NOS,1ML: HCPCS | Performed by: REGISTERED NURSE

## 2025-04-03 PROCEDURE — 2500000003 HC RX 250 WO HCPCS: Performed by: REGISTERED NURSE

## 2025-04-03 PROCEDURE — 72156 MRI NECK SPINE W/O & W/DYE: CPT

## 2025-04-03 PROCEDURE — 6360000004 HC RX CONTRAST MEDICATION: Performed by: REGISTERED NURSE

## 2025-04-03 RX ORDER — SODIUM CHLORIDE 0.9 % (FLUSH) 0.9 %
10 SYRINGE (ML) INJECTION PRN
Status: DISCONTINUED | OUTPATIENT
Start: 2025-04-03 | End: 2025-04-06 | Stop reason: HOSPADM

## 2025-04-03 RX ADMIN — GADOTERIDOL 20 ML: 279.3 INJECTION, SOLUTION INTRAVENOUS at 14:58

## 2025-04-03 RX ADMIN — SODIUM CHLORIDE, PRESERVATIVE FREE 10 ML: 5 INJECTION INTRAVENOUS at 14:59

## 2025-04-07 ENCOUNTER — RESULTS FOLLOW-UP (OUTPATIENT)
Dept: FAMILY MEDICINE CLINIC | Age: 40
End: 2025-04-07

## 2025-05-27 ENCOUNTER — HOSPITAL ENCOUNTER (OUTPATIENT)
Age: 40
Discharge: HOME | End: 2025-05-27
Payer: COMMERCIAL

## 2025-05-27 LAB
POTASSIUM SERPL-SCNC: 3.2 MMOL/L (ref 3.7–5.3)
T3FREE SERPL-MCNC: 2.86 PG/ML (ref 2–4.4)
T4 FREE SERPL-MCNC: 1.3 NG/DL (ref 0.92–1.68)

## 2025-05-27 PROCEDURE — 84481 FREE ASSAY (FT-3): CPT

## 2025-05-27 PROCEDURE — 84439 ASSAY OF FREE THYROXINE: CPT

## 2025-05-27 PROCEDURE — 84132 ASSAY OF SERUM POTASSIUM: CPT

## 2025-05-27 PROCEDURE — 36415 COLL VENOUS BLD VENIPUNCTURE: CPT

## 2025-06-05 ENCOUNTER — RESULTS FOLLOW-UP (OUTPATIENT)
Dept: FAMILY MEDICINE CLINIC | Age: 40
End: 2025-06-05

## 2025-06-05 DIAGNOSIS — Z12.31 ENCOUNTER FOR SCREENING MAMMOGRAM FOR MALIGNANT NEOPLASM OF BREAST: ICD-10-CM
